# Patient Record
Sex: MALE | Race: WHITE | Employment: FULL TIME | URBAN - METROPOLITAN AREA
[De-identification: names, ages, dates, MRNs, and addresses within clinical notes are randomized per-mention and may not be internally consistent; named-entity substitution may affect disease eponyms.]

---

## 2017-01-13 ENCOUNTER — TELEPHONE (OUTPATIENT)
Dept: FAMILY MEDICINE CLINIC | Age: 62
End: 2017-01-13

## 2017-01-19 ENCOUNTER — OFFICE VISIT (OUTPATIENT)
Dept: FAMILY MEDICINE CLINIC | Age: 62
End: 2017-01-19

## 2017-01-19 VITALS
RESPIRATION RATE: 16 BRPM | SYSTOLIC BLOOD PRESSURE: 142 MMHG | TEMPERATURE: 97.9 F | BODY MASS INDEX: 32.25 KG/M2 | WEIGHT: 231.2 LBS | HEART RATE: 60 BPM | DIASTOLIC BLOOD PRESSURE: 92 MMHG

## 2017-01-19 DIAGNOSIS — R73.01 IFG (IMPAIRED FASTING GLUCOSE): Primary | ICD-10-CM

## 2017-01-19 DIAGNOSIS — K21.9 GASTROESOPHAGEAL REFLUX DISEASE WITHOUT ESOPHAGITIS: ICD-10-CM

## 2017-01-19 DIAGNOSIS — R93.2 ABNORMAL GALLBLADDER ULTRASOUND: ICD-10-CM

## 2017-01-19 DIAGNOSIS — K80.50 BILIARY COLIC: ICD-10-CM

## 2017-01-19 DIAGNOSIS — F41.9 ANXIETY: ICD-10-CM

## 2017-01-19 DIAGNOSIS — R97.20 INCREASED PROSTATE SPECIFIC ANTIGEN (PSA) VELOCITY: ICD-10-CM

## 2017-01-19 DIAGNOSIS — I15.2 HYPERTENSION DUE TO ENDOCRINE DISORDER: ICD-10-CM

## 2017-01-19 DIAGNOSIS — E78.5 HYPERLIPIDEMIA WITH TARGET LDL LESS THAN 100: ICD-10-CM

## 2017-01-19 PROCEDURE — 99214 OFFICE O/P EST MOD 30 MIN: CPT | Performed by: FAMILY MEDICINE

## 2017-01-19 RX ORDER — LISINOPRIL 20 MG/1
TABLET ORAL
Qty: 60 TABLET | Refills: 11 | Status: SHIPPED | OUTPATIENT
Start: 2017-01-19 | End: 2018-02-05 | Stop reason: SDUPTHER

## 2017-01-19 RX ORDER — RANITIDINE 150 MG/1
150 TABLET ORAL DAILY PRN
COMMUNITY
End: 2018-03-13

## 2017-01-19 RX ORDER — PRAVASTATIN SODIUM 40 MG
40 TABLET ORAL EVERY EVENING
Qty: 30 TABLET | Refills: 11 | Status: SHIPPED | OUTPATIENT
Start: 2017-01-19 | End: 2018-03-13

## 2017-01-19 RX ORDER — CIPROFLOXACIN 500 MG/1
500 TABLET, FILM COATED ORAL 2 TIMES DAILY
Qty: 84 TABLET | Refills: 0 | Status: SHIPPED | OUTPATIENT
Start: 2017-01-19 | End: 2017-03-02

## 2017-01-19 RX ORDER — PAROXETINE 10 MG/1
10 TABLET, FILM COATED ORAL EVERY MORNING
Qty: 30 TABLET | Refills: 11 | Status: SHIPPED | OUTPATIENT
Start: 2017-01-19 | End: 2017-09-14 | Stop reason: SDUPTHER

## 2017-01-19 RX ORDER — METOPROLOL SUCCINATE 100 MG/1
100 TABLET, EXTENDED RELEASE ORAL DAILY
Qty: 30 TABLET | Refills: 11 | Status: SHIPPED | OUTPATIENT
Start: 2017-01-19 | End: 2018-02-05 | Stop reason: SDUPTHER

## 2017-01-19 ASSESSMENT — ENCOUNTER SYMPTOMS
ABDOMINAL PAIN: 0
BACK PAIN: 1
VOMITING: 0
CHEST TIGHTNESS: 0
CONSTIPATION: 0
DIARRHEA: 0
BLOOD IN STOOL: 0
SHORTNESS OF BREATH: 0
NAUSEA: 0
ANAL BLEEDING: 0

## 2017-01-19 ASSESSMENT — PATIENT HEALTH QUESTIONNAIRE - PHQ9
1. LITTLE INTEREST OR PLEASURE IN DOING THINGS: 0
2. FEELING DOWN, DEPRESSED OR HOPELESS: 0
SUM OF ALL RESPONSES TO PHQ9 QUESTIONS 1 & 2: 0
SUM OF ALL RESPONSES TO PHQ QUESTIONS 1-9: 0

## 2017-01-27 ENCOUNTER — OFFICE VISIT (OUTPATIENT)
Age: 62
End: 2017-01-27

## 2017-01-27 VITALS
RESPIRATION RATE: 16 BRPM | TEMPERATURE: 97.5 F | DIASTOLIC BLOOD PRESSURE: 74 MMHG | BODY MASS INDEX: 32.45 KG/M2 | SYSTOLIC BLOOD PRESSURE: 138 MMHG | HEART RATE: 60 BPM | WEIGHT: 231.8 LBS | OXYGEN SATURATION: 97 % | HEIGHT: 71 IN

## 2017-01-27 DIAGNOSIS — K80.10 CHRONIC CHOLECYSTITIS WITH CALCULUS: Primary | ICD-10-CM

## 2017-01-27 DIAGNOSIS — I10 ESSENTIAL HYPERTENSION: ICD-10-CM

## 2017-01-27 PROCEDURE — 99244 OFF/OP CNSLTJ NEW/EST MOD 40: CPT | Performed by: SURGERY

## 2017-01-27 ASSESSMENT — ENCOUNTER SYMPTOMS
ABDOMINAL DISTENTION: 0
RHINORRHEA: 0
EYE DISCHARGE: 0
EYE ITCHING: 0
TROUBLE SWALLOWING: 0
VOICE CHANGE: 0
NAUSEA: 0
EYE PAIN: 0
COUGH: 0
SORE THROAT: 0
FACIAL SWELLING: 0
VOMITING: 0
CONSTIPATION: 0
BACK PAIN: 1
BLOOD IN STOOL: 0
SINUS PRESSURE: 0
PHOTOPHOBIA: 0
STRIDOR: 0
ANAL BLEEDING: 0
COLOR CHANGE: 0
SHORTNESS OF BREATH: 0
CHOKING: 0
APNEA: 0
RECTAL PAIN: 0
ABDOMINAL PAIN: 1
EYE REDNESS: 0
CHEST TIGHTNESS: 0
DIARRHEA: 0
WHEEZING: 0

## 2017-03-02 ENCOUNTER — OFFICE VISIT (OUTPATIENT)
Dept: FAMILY MEDICINE CLINIC | Age: 62
End: 2017-03-02

## 2017-03-02 VITALS
SYSTOLIC BLOOD PRESSURE: 146 MMHG | BODY MASS INDEX: 31.38 KG/M2 | RESPIRATION RATE: 16 BRPM | WEIGHT: 225 LBS | DIASTOLIC BLOOD PRESSURE: 80 MMHG | HEART RATE: 60 BPM | TEMPERATURE: 97.7 F

## 2017-03-02 DIAGNOSIS — I15.2 HYPERTENSION DUE TO ENDOCRINE DISORDER: ICD-10-CM

## 2017-03-02 DIAGNOSIS — R73.01 IFG (IMPAIRED FASTING GLUCOSE): Primary | ICD-10-CM

## 2017-03-02 DIAGNOSIS — R17 ELEVATED BILIRUBIN: ICD-10-CM

## 2017-03-02 DIAGNOSIS — E78.5 HYPERLIPIDEMIA WITH TARGET LDL LESS THAN 100: ICD-10-CM

## 2017-03-02 DIAGNOSIS — F41.9 ANXIETY: ICD-10-CM

## 2017-03-02 DIAGNOSIS — K21.9 GASTROESOPHAGEAL REFLUX DISEASE WITHOUT ESOPHAGITIS: ICD-10-CM

## 2017-03-02 DIAGNOSIS — E26.9 HYPERALDOSTERONISM (HCC): ICD-10-CM

## 2017-03-02 PROCEDURE — 99213 OFFICE O/P EST LOW 20 MIN: CPT | Performed by: FAMILY MEDICINE

## 2017-03-02 ASSESSMENT — ENCOUNTER SYMPTOMS
DIARRHEA: 0
NAUSEA: 0
ANAL BLEEDING: 0
CHEST TIGHTNESS: 0
CONSTIPATION: 0
BLOOD IN STOOL: 0
SHORTNESS OF BREATH: 0
VOMITING: 0
ABDOMINAL PAIN: 0

## 2017-03-28 ENCOUNTER — TELEPHONE (OUTPATIENT)
Age: 62
End: 2017-03-28

## 2017-05-02 RX ORDER — EPLERENONE 50 MG/1
50 TABLET, FILM COATED ORAL DAILY
Qty: 30 TABLET | Refills: 0 | Status: CANCELLED | OUTPATIENT
Start: 2017-05-02

## 2017-08-14 RX ORDER — AMLODIPINE BESYLATE 5 MG/1
TABLET ORAL
Qty: 60 TABLET | Refills: 11 | Status: SHIPPED | OUTPATIENT
Start: 2017-08-14 | End: 2018-03-26 | Stop reason: ALTCHOICE

## 2017-09-06 ENCOUNTER — HOSPITAL ENCOUNTER (OUTPATIENT)
Age: 62
Discharge: HOME | End: 2017-09-06
Payer: COMMERCIAL

## 2017-09-06 DIAGNOSIS — R17 ELEVATED BILIRUBIN: ICD-10-CM

## 2017-09-06 DIAGNOSIS — E78.5 HYPERLIPIDEMIA WITH TARGET LDL LESS THAN 100: ICD-10-CM

## 2017-09-06 DIAGNOSIS — R73.01 IFG (IMPAIRED FASTING GLUCOSE): ICD-10-CM

## 2017-09-06 LAB
ALBUMIN SERPL-MCNC: 4.2 G/DL (ref 3.5–5.1)
ALP BLD-CCNC: 74 U/L (ref 38–126)
ALT SERPL-CCNC: 25 U/L (ref 11–66)
AST SERPL-CCNC: 22 U/L (ref 5–40)
AVERAGE GLUCOSE: 108 MG/DL (ref 70–126)
BILIRUB SERPL-MCNC: 1.2 MG/DL (ref 0.3–1.2)
BILIRUBIN DIRECT: < 0.2 MG/DL (ref 0–0.3)
HBA1C MFR BLD: 5.6 % (ref 4.4–6.4)
HDLC SERPL-MCNC: 42 MG/DL
LDL CHOLESTEROL DIRECT: 127.09 MG/DL
TOTAL PROTEIN: 7.1 G/DL (ref 6.1–8)

## 2017-09-06 PROCEDURE — 83036 HEMOGLOBIN GLYCOSYLATED A1C: CPT

## 2017-09-06 PROCEDURE — 80076 HEPATIC FUNCTION PANEL: CPT

## 2017-09-06 PROCEDURE — 36415 COLL VENOUS BLD VENIPUNCTURE: CPT

## 2017-09-06 PROCEDURE — 83721 ASSAY OF BLOOD LIPOPROTEIN: CPT

## 2017-09-06 PROCEDURE — 83718 ASSAY OF LIPOPROTEIN: CPT

## 2017-09-14 ENCOUNTER — OFFICE VISIT (OUTPATIENT)
Dept: FAMILY MEDICINE CLINIC | Age: 62
End: 2017-09-14
Payer: COMMERCIAL

## 2017-09-14 VITALS
TEMPERATURE: 98.6 F | WEIGHT: 226 LBS | HEART RATE: 56 BPM | BODY MASS INDEX: 32.35 KG/M2 | SYSTOLIC BLOOD PRESSURE: 156 MMHG | HEIGHT: 70 IN | RESPIRATION RATE: 16 BRPM | DIASTOLIC BLOOD PRESSURE: 90 MMHG

## 2017-09-14 DIAGNOSIS — R73.01 IFG (IMPAIRED FASTING GLUCOSE): Primary | ICD-10-CM

## 2017-09-14 DIAGNOSIS — E26.9 HYPERALDOSTERONISM (HCC): ICD-10-CM

## 2017-09-14 DIAGNOSIS — E78.5 HYPERLIPIDEMIA WITH TARGET LDL LESS THAN 100: ICD-10-CM

## 2017-09-14 DIAGNOSIS — I15.2 HYPERTENSION DUE TO ENDOCRINE DISORDER: ICD-10-CM

## 2017-09-14 DIAGNOSIS — F41.9 ANXIETY: ICD-10-CM

## 2017-09-14 DIAGNOSIS — K21.9 GASTROESOPHAGEAL REFLUX DISEASE WITHOUT ESOPHAGITIS: ICD-10-CM

## 2017-09-14 DIAGNOSIS — Z12.5 SCREENING PSA (PROSTATE SPECIFIC ANTIGEN): ICD-10-CM

## 2017-09-14 PROCEDURE — 99214 OFFICE O/P EST MOD 30 MIN: CPT | Performed by: FAMILY MEDICINE

## 2017-09-14 RX ORDER — EPLERENONE 50 MG/1
50 TABLET, FILM COATED ORAL DAILY
Qty: 30 TABLET | Refills: 0 | Status: SHIPPED | OUTPATIENT
Start: 2017-09-14 | End: 2018-02-05 | Stop reason: SDUPTHER

## 2017-09-14 RX ORDER — PAROXETINE HYDROCHLORIDE 20 MG/1
20 TABLET, FILM COATED ORAL EVERY MORNING
Qty: 30 TABLET | Refills: 1 | Status: SHIPPED | OUTPATIENT
Start: 2017-09-14 | End: 2018-02-05 | Stop reason: SDUPTHER

## 2017-09-14 ASSESSMENT — ENCOUNTER SYMPTOMS
ABDOMINAL PAIN: 0
CHEST TIGHTNESS: 0
SHORTNESS OF BREATH: 0
CONSTIPATION: 0
DIARRHEA: 0
ANAL BLEEDING: 0
VOMITING: 0
BLOOD IN STOOL: 0
NAUSEA: 0

## 2018-02-05 DIAGNOSIS — I15.2 HYPERTENSION DUE TO ENDOCRINE DISORDER: ICD-10-CM

## 2018-02-05 DIAGNOSIS — F41.9 ANXIETY: ICD-10-CM

## 2018-02-05 RX ORDER — PAROXETINE HYDROCHLORIDE 20 MG/1
20 TABLET, FILM COATED ORAL EVERY MORNING
Qty: 30 TABLET | Refills: 11 | Status: SHIPPED | OUTPATIENT
Start: 2018-02-05 | End: 2019-02-23 | Stop reason: SDUPTHER

## 2018-02-05 RX ORDER — EPLERENONE 50 MG/1
50 TABLET, FILM COATED ORAL DAILY
Qty: 30 TABLET | Refills: 11 | Status: SHIPPED | OUTPATIENT
Start: 2018-02-05 | End: 2018-03-13 | Stop reason: SDUPTHER

## 2018-02-05 RX ORDER — LISINOPRIL 20 MG/1
TABLET ORAL
Qty: 60 TABLET | Refills: 11 | Status: ON HOLD | OUTPATIENT
Start: 2018-02-05 | End: 2018-07-19 | Stop reason: HOSPADM

## 2018-02-05 RX ORDER — METOPROLOL SUCCINATE 100 MG/1
100 TABLET, EXTENDED RELEASE ORAL DAILY
Qty: 30 TABLET | Refills: 11 | Status: ON HOLD | OUTPATIENT
Start: 2018-02-05 | End: 2018-07-19 | Stop reason: HOSPADM

## 2018-02-12 ENCOUNTER — TELEPHONE (OUTPATIENT)
Dept: FAMILY MEDICINE CLINIC | Age: 63
End: 2018-02-12

## 2018-02-12 NOTE — TELEPHONE ENCOUNTER
Babar requested an appointment through [address] for March with Dr Christopher Wooten. However, there are no openings for several weeks/months. He is wanting to reschedule his appointment from Nov 2, 2017. See patients MyChart message below. Please advise patient. I had an appointment with Dr Kevin Meek on 11/2/2017 that I had to cancel.   I am needing to reschedule that.   ----- Message -----   From: Antionette Malcolmt: 2/6/2018  8:57 AM EST   To: Subha Haddad   Subject: RE: Appointment Request   Quintin Eckert -   We received your appointment request but it's not clear what the reason for the visit is. Please advise.       ----- Message -----   Jacky Iglesias From: Subha Haddad      Sent: 2/5/2018  4:47 PM EST        To: Patient Appointment Schedule Request Mailing List   Subject: Appointment Request      Appointment Request From: Subha Haddad      With Provider: Monika Oscar MD [-Primary Care Physician-]      Preferred Date Range: From 3/1/2018 To 3/30/2018      Preferred Times: Any      Reason for visit: Office Visit      Comments

## 2018-02-12 NOTE — TELEPHONE ENCOUNTER
Ok to accommodate with an appt in March? Patient was last seen on 9/14/17 and had medication adjusted and this was supposed to be a f/u appt to that.  (Paxil increased and Inspra resumed)

## 2018-03-09 ENCOUNTER — HOSPITAL ENCOUNTER (OUTPATIENT)
Age: 63
Discharge: HOME | End: 2018-03-09
Payer: COMMERCIAL

## 2018-03-09 DIAGNOSIS — I15.2 HYPERTENSION DUE TO ENDOCRINE DISORDER: ICD-10-CM

## 2018-03-09 DIAGNOSIS — Z12.5 SCREENING PSA (PROSTATE SPECIFIC ANTIGEN): ICD-10-CM

## 2018-03-09 DIAGNOSIS — E78.5 HYPERLIPIDEMIA WITH TARGET LDL LESS THAN 100: ICD-10-CM

## 2018-03-09 DIAGNOSIS — R73.01 IFG (IMPAIRED FASTING GLUCOSE): ICD-10-CM

## 2018-03-09 LAB
ANION GAP SERPL CALCULATED.3IONS-SCNC: 12 MEQ/L (ref 8–16)
AVERAGE GLUCOSE: 114 MG/DL (ref 70–126)
BUN BLDV-MCNC: 18 MG/DL (ref 7–22)
CALCIUM SERPL-MCNC: 9.4 MG/DL (ref 8.5–10.5)
CHLORIDE BLD-SCNC: 101 MEQ/L (ref 98–111)
CHOLESTEROL, TOTAL: 195 MG/DL (ref 100–199)
CO2: 28 MEQ/L (ref 23–33)
CREAT SERPL-MCNC: 1.1 MG/DL (ref 0.4–1.2)
GFR SERPL CREATININE-BSD FRML MDRD: 68 ML/MIN/1.73M2
GLUCOSE BLD-MCNC: 104 MG/DL (ref 70–108)
HBA1C MFR BLD: 5.8 % (ref 4.4–6.4)
HDLC SERPL-MCNC: 43 MG/DL
LDL CHOLESTEROL CALCULATED: 124 MG/DL
POTASSIUM SERPL-SCNC: 4.2 MEQ/L (ref 3.5–5.2)
PROSTATE SPECIFIC ANTIGEN: 1.01 NG/ML (ref 0–1)
SODIUM BLD-SCNC: 141 MEQ/L (ref 135–145)
TRIGL SERPL-MCNC: 142 MG/DL (ref 0–199)

## 2018-03-09 PROCEDURE — G0103 PSA SCREENING: HCPCS

## 2018-03-09 PROCEDURE — 80061 LIPID PANEL: CPT

## 2018-03-09 PROCEDURE — 83036 HEMOGLOBIN GLYCOSYLATED A1C: CPT

## 2018-03-09 PROCEDURE — 36415 COLL VENOUS BLD VENIPUNCTURE: CPT

## 2018-03-09 PROCEDURE — 80048 BASIC METABOLIC PNL TOTAL CA: CPT

## 2018-03-13 ENCOUNTER — OFFICE VISIT (OUTPATIENT)
Dept: FAMILY MEDICINE CLINIC | Age: 63
End: 2018-03-13
Payer: COMMERCIAL

## 2018-03-13 VITALS
TEMPERATURE: 98.2 F | DIASTOLIC BLOOD PRESSURE: 90 MMHG | HEIGHT: 70 IN | WEIGHT: 226 LBS | HEART RATE: 60 BPM | BODY MASS INDEX: 32.35 KG/M2 | SYSTOLIC BLOOD PRESSURE: 142 MMHG | RESPIRATION RATE: 16 BRPM

## 2018-03-13 DIAGNOSIS — K63.5 POLYP OF COLON, UNSPECIFIED PART OF COLON, UNSPECIFIED TYPE: ICD-10-CM

## 2018-03-13 DIAGNOSIS — F41.9 ANXIETY: ICD-10-CM

## 2018-03-13 DIAGNOSIS — R73.01 IFG (IMPAIRED FASTING GLUCOSE): Primary | ICD-10-CM

## 2018-03-13 DIAGNOSIS — K21.9 GASTROESOPHAGEAL REFLUX DISEASE WITHOUT ESOPHAGITIS: ICD-10-CM

## 2018-03-13 DIAGNOSIS — I15.2 HYPERTENSION DUE TO ENDOCRINE DISORDER: ICD-10-CM

## 2018-03-13 DIAGNOSIS — E78.5 HYPERLIPIDEMIA WITH TARGET LDL LESS THAN 100: ICD-10-CM

## 2018-03-13 PROCEDURE — 99214 OFFICE O/P EST MOD 30 MIN: CPT | Performed by: FAMILY MEDICINE

## 2018-03-13 RX ORDER — EPLERENONE 50 MG/1
50 TABLET, FILM COATED ORAL 2 TIMES DAILY
Qty: 60 TABLET | Refills: 1 | Status: SHIPPED | OUTPATIENT
Start: 2018-03-13 | End: 2020-01-16

## 2018-03-13 ASSESSMENT — PATIENT HEALTH QUESTIONNAIRE - PHQ9
2. FEELING DOWN, DEPRESSED OR HOPELESS: 0
1. LITTLE INTEREST OR PLEASURE IN DOING THINGS: 0
SUM OF ALL RESPONSES TO PHQ9 QUESTIONS 1 & 2: 0
SUM OF ALL RESPONSES TO PHQ QUESTIONS 1-9: 0

## 2018-03-13 ASSESSMENT — ENCOUNTER SYMPTOMS
CONSTIPATION: 0
CHEST TIGHTNESS: 0
SHORTNESS OF BREATH: 0
ABDOMINAL PAIN: 0
VOMITING: 0
DIARRHEA: 0
BLOOD IN STOOL: 0
NAUSEA: 0
ANAL BLEEDING: 0

## 2018-03-13 NOTE — PROGRESS NOTES
Dr Martina Nur can see him 3-26-18 at 10:20 for a follow up visit. I called [facility] to make colonoscopy appt, they state he has been discharged from the practice in 2014.  I will work on another GI referral.

## 2018-03-13 NOTE — PROGRESS NOTES
[facility]  [facility]  Dept: [phone]  Dept Fax: [phone]    RAFAEL Herrera is a 63 y.o.male    Pt feeling ok since last visit- no new complaints, issues, or problems, except as noted below: The home BP readings have been in the 150 /  range. Checking intermittently. Pt states he not followed up with Dr. Virgilio Bustamante as directed at time of last visit. Otherwise, pt feeling \"pretty good\" at this time  Sleep-OK  Interest- OK  Guilt- OK  Energy- OK  Concentration- OK  Appetite- OK  Psychomotor Retardation- NO  Suicidal/ Homicidal Ideations- NO  Anxiety-OK  Libido- OK    Employer? Lost work days? - [employer]- no lost days    Patient Active Problem List   Diagnosis    Hyperaldosteronism- Dr. Aracely Keita Colon polyps    GERD (gastroesophageal reflux disease)    Hyperlipidemia with target LDL less than 100    Anxiety    Hypertension due to endocrine disorder    IFG (impaired fasting glucose)       Current Outpatient Prescriptions   Medication Sig Dispense Refill    eplerenone (INSPRA) 50 MG tablet Take 1 tablet by mouth 2 times daily 60 tablet 1    lisinopril (PRINIVIL;ZESTRIL) 20 MG tablet TAKE ONE TABLET BY MOUTH TWICE DAILY 60 tablet 11    metoprolol succinate (TOPROL XL) 100 MG extended release tablet Take 1 tablet by mouth daily 30 tablet 11    PARoxetine (PAXIL) 20 MG tablet Take 1 tablet by mouth every morning 30 tablet 11    amLODIPine (NORVASC) 5 MG tablet TAKE ONE TABLET BY MOUTH TWICE DAILY 60 tablet 11     No current facility-administered medications for this visit. Review of Systems   Constitutional: Negative for chills, diaphoresis, fatigue, fever and unexpected weight change. Eyes: Negative for visual disturbance. Respiratory: Negative for chest tightness and shortness of breath. Cardiovascular: Negative for chest pain, palpitations and leg swelling.    Gastrointestinal: Negative for abdominal pain, anal bleeding, blood in stool, constipation, diarrhea, nausea and vomiting. Genitourinary: Negative for dysuria and hematuria. Musculoskeletal: Negative for neck pain. Neurological: Negative for dizziness, light-headedness and headaches. OBJECTIVE     BP (!) 162/88   Pulse 60   Temp 98.2 °F (36.8 °C) (Oral)   Resp 16   Ht 5' 10\" (1.778 m)   Wt 226 lb (102.5 kg)   BMI 32.43 kg/m²     Wt Readings from Last 3 Encounters:   03/13/18 226 lb (102.5 kg)   09/14/17 226 lb (102.5 kg)   03/02/17 225 lb (102.1 kg)     Body mass index is 32.43 kg/m². Physical Exam   Constitutional: He is oriented to person, place, and time. He appears well-developed and well-nourished. HENT:   Head: Normocephalic and atraumatic. Right Ear: External ear normal.   Left Ear: External ear normal.   Nose: Nose normal.   Mouth/Throat: Oropharynx is clear and moist.   Eyes: Conjunctivae and EOM are normal. Pupils are equal, round, and reactive to light. Neck: Normal range of motion. Neck supple. Cardiovascular: Normal rate, regular rhythm and intact distal pulses. Pulmonary/Chest: Effort normal and breath sounds normal.   Abdominal: Soft. Bowel sounds are normal.   Genitourinary:   Genitourinary Comments: KEYSHA deferred today as pt currently asymptomatic. Pt denies dysuria, hematuria, frequency, urgency, difficulty starting/ stopping stream, frequent nocturia    Will reconsider annually. ES     Musculoskeletal: Normal range of motion. Neurological: He is alert and oriented to person, place, and time. He has normal reflexes. Skin: Skin is warm and dry. Psychiatric: He has a normal mood and affect. His behavior is normal. Judgment and thought content normal.   Nursing note and vitals reviewed.         Component      Latest Ref Rng & Units 3/9/2018 9/6/2017   Sodium      135 - 145 meq/L 141    Potassium      3.5 - 5.2 meq/L 4.2    Chloride      98 - 111 meq/L 101    CO2      23 - 33 meq/L 28    Glucose      70 - 108 mg/dL 104    BUN 7 - 22 mg/dL 18    Creatinine      0.4 - 1.2 mg/dL 1.1    Calcium      8.5 - 10.5 mg/dL 9.4    Albumin      3.5 - 5.1 g/dL  4.2   Bilirubin      0.3 - 1.2 mg/dL  1.2   Bilirubin, Direct      0.0 - 0.3 mg/dL  <0.2   Alk Phos      38 - 126 U/L  74   AST      5 - 40 U/L  22   ALT      11 - 66 U/L  25   Total Protein      6.1 - 8.0 g/dL  7.1   Cholesterol, Total      100 - 199 mg/dL 195    Triglycerides      0 - 199 mg/dL 142    HDL Cholesterol      mg/dL 43    LDL Calculated      mg/dL 124    Hemoglobin A1C      4.4 - 6.4 % 5.8    AVERAGE GLUCOSE      70 - 126 mg/dL 114    Prostatic Specific Ag      0.00 - 1.00 ng/ml 1.01 (H)    Anion Gap      8.0 - 16.0 meq/L 12.0    Est, Glom Filt Rate      ml/min/1.73m2 68 (A)        Component      Latest Ref Rng & Units 3/9/2018 2/28/2017 1/18/2017 5/18/2015   Prostatic Specific Ag      0.00 - 1.00 ng/ml 1.01 (H) 0.94 2.53 (H) 0.90     Component      Latest Ref Rng & Units 11/12/2014 5/9/2013 10/20/2011   Prostatic Specific Ag      0.00 - 1.00 ng/ml 1.87 0.92 1.41       The 10-year ASCVD risk score (Susan Suh, et al., 2013) is: 16.2%    Values used to calculate the score:      Age: 63 years      Sex: Male      Is Non- : No      Diabetic: No      Tobacco smoker: No      Systolic Blood Pressure: 855 mmHg      Is BP treated: Yes      HDL Cholesterol: 43 mg/dL      Total Cholesterol: 195 mg/dL    Immunization History   Administered Date(s) Administered    Influenza Virus Vaccine 10/10/2013    Zoster Live (Zostavax) 11/11/2013       Health Maintenance   Topic Date Due    DTaP/Tdap/Td vaccine (1 - Tdap) 03/15/1976    Shingles Vaccine (1 of 2 - 2 Dose Series) 03/15/2007    Colon cancer screen colonoscopy  05/29/2016    Hepatitis C screen  09/14/2018 (Originally 1955)    HIV screen  09/14/2018 (Originally 3/15/1972)    Flu vaccine (1) 10/13/2018 (Originally 9/1/2017)    A1C test (Diabetic or Prediabetic)  03/09/2019    Potassium monitoring

## 2018-03-14 ENCOUNTER — TELEPHONE (OUTPATIENT)
Dept: FAMILY MEDICINE CLINIC | Age: 63
End: 2018-03-14

## 2018-03-14 NOTE — TELEPHONE ENCOUNTER
[facility] called back and stated that the pts wife did pay his bill there so they are able to schedule him an appt. They will contact him to schedule.

## 2018-03-26 ENCOUNTER — OFFICE VISIT (OUTPATIENT)
Dept: NEPHROLOGY | Age: 63
End: 2018-03-26
Payer: COMMERCIAL

## 2018-03-26 VITALS
BODY MASS INDEX: 32.43 KG/M2 | DIASTOLIC BLOOD PRESSURE: 90 MMHG | WEIGHT: 226 LBS | HEART RATE: 60 BPM | SYSTOLIC BLOOD PRESSURE: 142 MMHG | RESPIRATION RATE: 20 BRPM

## 2018-03-26 DIAGNOSIS — F41.9 ANXIETY: ICD-10-CM

## 2018-03-26 DIAGNOSIS — I10 ESSENTIAL HYPERTENSION: Primary | ICD-10-CM

## 2018-03-26 PROCEDURE — 99214 OFFICE O/P EST MOD 30 MIN: CPT | Performed by: INTERNAL MEDICINE

## 2018-03-26 RX ORDER — CHLORTHALIDONE 25 MG/1
25 TABLET ORAL DAILY
Qty: 30 TABLET | Refills: 3 | Status: ON HOLD | OUTPATIENT
Start: 2018-03-26 | End: 2018-06-10 | Stop reason: HOSPADM

## 2018-03-26 NOTE — PROGRESS NOTES
primary care provider from 25 mg to 50 mg daily. Blood pressure was high on arrival.  Repeat blood pressure is 130/90.    ROS:Constitutional: negative  Eyes: negative  Ears, nose, mouth, throat, and face: negative  Respiratory: negative  Cardiovascular: negative  Gastrointestinal: negative  Genitourinary:negative  Integument/breast: negative  Hematologic/lymphatic: negative  Musculoskeletal:negative  Neurological: negative  Behavioral/Psych: positive for anxiety  Endocrine: negative  Allergic/Immunologic: negative  Medications:     Current Outpatient Prescriptions   Medication Sig Dispense Refill    polyethylene glycol (GLYCOLAX) powder Colonoscopy Prep Dispense 255 Gram Bottle. Use as Directed 255 g 0    eplerenone (INSPRA) 50 MG tablet Take 1 tablet by mouth 2 times daily 60 tablet 1    lisinopril (PRINIVIL;ZESTRIL) 20 MG tablet TAKE ONE TABLET BY MOUTH TWICE DAILY 60 tablet 11    metoprolol succinate (TOPROL XL) 100 MG extended release tablet Take 1 tablet by mouth daily 30 tablet 11    PARoxetine (PAXIL) 20 MG tablet Take 1 tablet by mouth every morning 30 tablet 11    amLODIPine (NORVASC) 5 MG tablet TAKE ONE TABLET BY MOUTH TWICE DAILY 60 tablet 11     No current facility-administered medications for this visit.         Lab Results:    CBC:   Lab Results   Component Value Date    WBC 6.2 11/07/2016    HGB 15.1 11/07/2016    HCT 46.1 11/07/2016    MCV 85.2 11/07/2016     11/07/2016     BMP:    Lab Results   Component Value Date     03/09/2018     02/28/2017     11/07/2016    K 4.2 03/09/2018    K 4.1 02/28/2017    K 3.7 11/07/2016     03/09/2018     02/28/2017    CL 99 11/07/2016    CO2 28 03/09/2018    CO2 28 02/28/2017    CO2 29 11/07/2016    BUN 18 03/09/2018    BUN 13 02/28/2017    BUN 18 11/07/2016    CREATININE 1.1 03/09/2018    CREATININE 1.1 02/28/2017    CREATININE 1.1 11/07/2016    GLUCOSE 104 03/09/2018    GLUCOSE 109 (H) 02/28/2017    GLUCOSE 116 (H)

## 2018-04-25 ENCOUNTER — HOSPITAL ENCOUNTER (OUTPATIENT)
Age: 63
Discharge: HOME | End: 2018-04-25
Payer: COMMERCIAL

## 2018-04-25 DIAGNOSIS — I15.2 HYPERTENSION DUE TO ENDOCRINE DISORDER: ICD-10-CM

## 2018-04-25 LAB
ANION GAP SERPL CALCULATED.3IONS-SCNC: 10 MEQ/L (ref 8–16)
BUN BLDV-MCNC: 19 MG/DL (ref 7–22)
CALCIUM SERPL-MCNC: 9.7 MG/DL (ref 8.5–10.5)
CHLORIDE BLD-SCNC: 99 MEQ/L (ref 98–111)
CO2: 34 MEQ/L (ref 23–33)
CREAT SERPL-MCNC: 1.2 MG/DL (ref 0.4–1.2)
GFR SERPL CREATININE-BSD FRML MDRD: 62 ML/MIN/1.73M2
GLUCOSE BLD-MCNC: 112 MG/DL (ref 70–108)
POTASSIUM SERPL-SCNC: 3.8 MEQ/L (ref 3.5–5.2)
SODIUM BLD-SCNC: 143 MEQ/L (ref 135–145)

## 2018-04-25 PROCEDURE — 36415 COLL VENOUS BLD VENIPUNCTURE: CPT

## 2018-04-25 PROCEDURE — 80048 BASIC METABOLIC PNL TOTAL CA: CPT

## 2018-05-01 ENCOUNTER — OFFICE VISIT (OUTPATIENT)
Dept: NEPHROLOGY | Age: 63
End: 2018-05-01
Payer: COMMERCIAL

## 2018-05-01 VITALS — SYSTOLIC BLOOD PRESSURE: 130 MMHG | WEIGHT: 223.8 LBS | DIASTOLIC BLOOD PRESSURE: 90 MMHG | BODY MASS INDEX: 32.11 KG/M2

## 2018-05-01 DIAGNOSIS — E87.3 METABOLIC ALKALOSIS: ICD-10-CM

## 2018-05-01 DIAGNOSIS — F41.9 ANXIETY: ICD-10-CM

## 2018-05-01 DIAGNOSIS — I10 ESSENTIAL HYPERTENSION: Primary | ICD-10-CM

## 2018-05-01 DIAGNOSIS — R97.20 PSA ELEVATION: ICD-10-CM

## 2018-05-01 PROCEDURE — 99214 OFFICE O/P EST MOD 30 MIN: CPT | Performed by: INTERNAL MEDICINE

## 2018-06-06 ENCOUNTER — APPOINTMENT (OUTPATIENT)
Dept: GENERAL RADIOLOGY | Age: 63
DRG: 418 | End: 2018-06-06
Payer: COMMERCIAL

## 2018-06-06 ENCOUNTER — HOSPITAL ENCOUNTER (INPATIENT)
Age: 63
LOS: 3 days | Discharge: HOME | DRG: 418 | End: 2018-06-10
Attending: EMERGENCY MEDICINE | Admitting: INTERNAL MEDICINE
Payer: COMMERCIAL

## 2018-06-06 DIAGNOSIS — E26.9 HYPERALDOSTERONISM (HCC): ICD-10-CM

## 2018-06-06 DIAGNOSIS — R11.2 INTRACTABLE VOMITING WITH NAUSEA, UNSPECIFIED VOMITING TYPE: Primary | ICD-10-CM

## 2018-06-06 DIAGNOSIS — E87.6 HYPOKALEMIA: ICD-10-CM

## 2018-06-06 DIAGNOSIS — G89.18 ACUTE POSTOPERATIVE PAIN: ICD-10-CM

## 2018-06-06 DIAGNOSIS — I10 UNCONTROLLED HYPERTENSION: ICD-10-CM

## 2018-06-06 DIAGNOSIS — Z90.49 S/P LAPAROSCOPIC CHOLECYSTECTOMY: ICD-10-CM

## 2018-06-06 LAB
BASOPHILS # BLD: 0.9 %
BASOPHILS ABSOLUTE: 0.1 THOU/MM3 (ref 0–0.1)
EOSINOPHIL # BLD: 1.2 %
EOSINOPHILS ABSOLUTE: 0.1 THOU/MM3 (ref 0–0.4)
HCT VFR BLD CALC: 44 % (ref 42–52)
HEMOGLOBIN: 14.9 GM/DL (ref 14–18)
LYMPHOCYTES # BLD: 51.6 %
LYMPHOCYTES ABSOLUTE: 4.6 THOU/MM3 (ref 1–4.8)
MCH RBC QN AUTO: 29.2 PG (ref 27–31)
MCHC RBC AUTO-ENTMCNC: 34 GM/DL (ref 33–37)
MCV RBC AUTO: 86.1 FL (ref 80–94)
MONOCYTES # BLD: 10.1 %
MONOCYTES ABSOLUTE: 0.9 THOU/MM3 (ref 0.4–1.3)
NUCLEATED RED BLOOD CELLS: 0 /100 WBC
PDW BLD-RTO: 14.4 % (ref 11.5–14.5)
PLATELET # BLD: 255 THOU/MM3 (ref 130–400)
PMV BLD AUTO: 8.9 FL (ref 7.4–10.4)
RBC # BLD: 5.11 MILL/MM3 (ref 4.7–6.1)
SEG NEUTROPHILS: 36.2 %
SEGMENTED NEUTROPHILS ABSOLUTE COUNT: 3.2 THOU/MM3 (ref 1.8–7.7)
WBC # BLD: 8.9 THOU/MM3 (ref 4.8–10.8)

## 2018-06-06 PROCEDURE — 83735 ASSAY OF MAGNESIUM: CPT

## 2018-06-06 PROCEDURE — 80053 COMPREHEN METABOLIC PANEL: CPT

## 2018-06-06 PROCEDURE — 6370000000 HC RX 637 (ALT 250 FOR IP): Performed by: EMERGENCY MEDICINE

## 2018-06-06 PROCEDURE — 82248 BILIRUBIN DIRECT: CPT

## 2018-06-06 PROCEDURE — 84443 ASSAY THYROID STIM HORMONE: CPT

## 2018-06-06 PROCEDURE — 93005 ELECTROCARDIOGRAM TRACING: CPT | Performed by: EMERGENCY MEDICINE

## 2018-06-06 PROCEDURE — G0480 DRUG TEST DEF 1-7 CLASSES: HCPCS

## 2018-06-06 PROCEDURE — 36415 COLL VENOUS BLD VENIPUNCTURE: CPT

## 2018-06-06 PROCEDURE — 84484 ASSAY OF TROPONIN QUANT: CPT

## 2018-06-06 PROCEDURE — 83690 ASSAY OF LIPASE: CPT

## 2018-06-06 PROCEDURE — 71046 X-RAY EXAM CHEST 2 VIEWS: CPT

## 2018-06-06 PROCEDURE — 85025 COMPLETE CBC W/AUTO DIFF WBC: CPT

## 2018-06-06 PROCEDURE — 99285 EMERGENCY DEPT VISIT HI MDM: CPT

## 2018-06-06 RX ORDER — CLONIDINE HYDROCHLORIDE 0.1 MG/1
0.1 TABLET ORAL ONCE
Status: COMPLETED | OUTPATIENT
Start: 2018-06-07 | End: 2018-06-06

## 2018-06-06 RX ADMIN — CLONIDINE HYDROCHLORIDE 0.1 MG: 0.1 TABLET ORAL at 23:43

## 2018-06-06 ASSESSMENT — PAIN SCALES - GENERAL: PAINLEVEL_OUTOF10: 7

## 2018-06-06 ASSESSMENT — PAIN DESCRIPTION - PAIN TYPE: TYPE: ACUTE PAIN

## 2018-06-06 ASSESSMENT — PAIN DESCRIPTION - ORIENTATION: ORIENTATION: MID

## 2018-06-06 ASSESSMENT — PAIN DESCRIPTION - LOCATION: LOCATION: CHEST

## 2018-06-07 ENCOUNTER — APPOINTMENT (OUTPATIENT)
Dept: CT IMAGING | Age: 63
DRG: 418 | End: 2018-06-07
Payer: COMMERCIAL

## 2018-06-07 ENCOUNTER — APPOINTMENT (OUTPATIENT)
Dept: ULTRASOUND IMAGING | Age: 63
DRG: 418 | End: 2018-06-07
Payer: COMMERCIAL

## 2018-06-07 PROBLEM — E26.09 PRIMARY HYPERALDOSTERONISM (HCC): Status: ACTIVE | Noted: 2018-06-07

## 2018-06-07 LAB
ALBUMIN SERPL-MCNC: 4.3 G/DL (ref 3.5–5.1)
ALP BLD-CCNC: 75 U/L (ref 38–126)
ALT SERPL-CCNC: 28 U/L (ref 11–66)
AMPHETAMINE+METHAMPHETAMINE URINE SCREEN: NEGATIVE
ANION GAP SERPL CALCULATED.3IONS-SCNC: 15 MEQ/L (ref 8–16)
ANION GAP SERPL CALCULATED.3IONS-SCNC: 19 MEQ/L (ref 8–16)
AST SERPL-CCNC: 22 U/L (ref 5–40)
BARBITURATE QUANTITATIVE URINE: NEGATIVE
BENZODIAZEPINE QUANTITATIVE URINE: NEGATIVE
BILIRUB SERPL-MCNC: 0.9 MG/DL (ref 0.3–1.2)
BILIRUBIN DIRECT: < 0.2 MG/DL (ref 0–0.3)
BILIRUBIN URINE: NEGATIVE
BLOOD, URINE: NEGATIVE
BUN BLDV-MCNC: 19 MG/DL (ref 7–22)
BUN BLDV-MCNC: 21 MG/DL (ref 7–22)
CALCIUM SERPL-MCNC: 9.1 MG/DL (ref 8.5–10.5)
CALCIUM SERPL-MCNC: 9.3 MG/DL (ref 8.5–10.5)
CANNABINOID QUANTITATIVE URINE: NEGATIVE
CHARACTER, URINE: CLEAR
CHLORIDE BLD-SCNC: 100 MEQ/L (ref 98–111)
CHLORIDE BLD-SCNC: 98 MEQ/L (ref 98–111)
CO2: 24 MEQ/L (ref 23–33)
CO2: 30 MEQ/L (ref 23–33)
COCAINE METABOLITE QUANTITATIVE URINE: NEGATIVE
COLOR: YELLOW
CREAT SERPL-MCNC: 1.2 MG/DL (ref 0.4–1.2)
CREAT SERPL-MCNC: 1.2 MG/DL (ref 0.4–1.2)
EKG ATRIAL RATE: 53 BPM
EKG P AXIS: 27 DEGREES
EKG P-R INTERVAL: 156 MS
EKG Q-T INTERVAL: 460 MS
EKG QRS DURATION: 102 MS
EKG QTC CALCULATION (BAZETT): 431 MS
EKG R AXIS: -31 DEGREES
EKG T AXIS: 11 DEGREES
EKG VENTRICULAR RATE: 53 BPM
ETHYL ALCOHOL, SERUM: < 0.01 %
GFR SERPL CREATININE-BSD FRML MDRD: 62 ML/MIN/1.73M2
GFR SERPL CREATININE-BSD FRML MDRD: 62 ML/MIN/1.73M2
GLUCOSE BLD-MCNC: 113 MG/DL (ref 70–108)
GLUCOSE BLD-MCNC: 166 MG/DL (ref 70–108)
GLUCOSE URINE: NEGATIVE MG/DL
KETONES, URINE: NEGATIVE
LEUKOCYTE ESTERASE, URINE: NEGATIVE
LIPASE: 48.5 U/L (ref 5.6–51.3)
MAGNESIUM: 2.2 MG/DL (ref 1.6–2.4)
NITRITE, URINE: NEGATIVE
OPIATES, URINE: NEGATIVE
OSMOLALITY CALCULATION: 292.5 MOSMOL/KG (ref 275–300)
OXYCODONE: NEGATIVE
PH UA: 6.5
PHENCYCLIDINE QUANTITATIVE URINE: NEGATIVE
POTASSIUM SERPL-SCNC: 2.9 MEQ/L (ref 3.5–5.2)
POTASSIUM SERPL-SCNC: 3 MEQ/L (ref 3.5–5.2)
PROTEIN UA: NEGATIVE
SODIUM BLD-SCNC: 141 MEQ/L (ref 135–145)
SODIUM BLD-SCNC: 145 MEQ/L (ref 135–145)
SPECIFIC GRAVITY, URINE: 1.02 (ref 1–1.03)
TOTAL PROTEIN: 7.6 G/DL (ref 6.1–8)
TROPONIN T: < 0.01 NG/ML
TROPONIN T: < 0.01 NG/ML
TSH SERPL DL<=0.05 MIU/L-ACNC: 3.48 UIU/ML (ref 0.4–4.2)
UROBILINOGEN, URINE: 1 EU/DL

## 2018-06-07 PROCEDURE — 99254 IP/OBS CNSLTJ NEW/EST MOD 60: CPT | Performed by: INTERNAL MEDICINE

## 2018-06-07 PROCEDURE — 80307 DRUG TEST PRSMV CHEM ANLYZR: CPT

## 2018-06-07 PROCEDURE — 36415 COLL VENOUS BLD VENIPUNCTURE: CPT

## 2018-06-07 PROCEDURE — 6370000000 HC RX 637 (ALT 250 FOR IP): Performed by: INTERNAL MEDICINE

## 2018-06-07 PROCEDURE — 96375 TX/PRO/DX INJ NEW DRUG ADDON: CPT

## 2018-06-07 PROCEDURE — 84484 ASSAY OF TROPONIN QUANT: CPT

## 2018-06-07 PROCEDURE — 6360000004 HC RX CONTRAST MEDICATION: Performed by: EMERGENCY MEDICINE

## 2018-06-07 PROCEDURE — 6360000002 HC RX W HCPCS

## 2018-06-07 PROCEDURE — 96376 TX/PRO/DX INJ SAME DRUG ADON: CPT

## 2018-06-07 PROCEDURE — 2580000003 HC RX 258: Performed by: EMERGENCY MEDICINE

## 2018-06-07 PROCEDURE — 76705 ECHO EXAM OF ABDOMEN: CPT

## 2018-06-07 PROCEDURE — 6360000002 HC RX W HCPCS: Performed by: EMERGENCY MEDICINE

## 2018-06-07 PROCEDURE — 2580000003 HC RX 258: Performed by: INTERNAL MEDICINE

## 2018-06-07 PROCEDURE — 74177 CT ABD & PELVIS W/CONTRAST: CPT

## 2018-06-07 PROCEDURE — 96365 THER/PROPH/DIAG IV INF INIT: CPT

## 2018-06-07 PROCEDURE — 80048 BASIC METABOLIC PNL TOTAL CA: CPT

## 2018-06-07 PROCEDURE — 70450 CT HEAD/BRAIN W/O DYE: CPT

## 2018-06-07 PROCEDURE — 6370000000 HC RX 637 (ALT 250 FOR IP): Performed by: EMERGENCY MEDICINE

## 2018-06-07 PROCEDURE — 99222 1ST HOSP IP/OBS MODERATE 55: CPT | Performed by: SURGERY

## 2018-06-07 PROCEDURE — 1200000003 HC TELEMETRY R&B

## 2018-06-07 PROCEDURE — 93010 ELECTROCARDIOGRAM REPORT: CPT | Performed by: INTERNAL MEDICINE

## 2018-06-07 PROCEDURE — 96372 THER/PROPH/DIAG INJ SC/IM: CPT

## 2018-06-07 PROCEDURE — 2500000003 HC RX 250 WO HCPCS: Performed by: INTERNAL MEDICINE

## 2018-06-07 PROCEDURE — 99223 1ST HOSP IP/OBS HIGH 75: CPT | Performed by: INTERNAL MEDICINE

## 2018-06-07 PROCEDURE — 81003 URINALYSIS AUTO W/O SCOPE: CPT

## 2018-06-07 RX ORDER — PROMETHAZINE HYDROCHLORIDE 25 MG/ML
INJECTION, SOLUTION INTRAMUSCULAR; INTRAVENOUS
Status: DISPENSED
Start: 2018-06-07 | End: 2018-06-07

## 2018-06-07 RX ORDER — METOCLOPRAMIDE HYDROCHLORIDE 5 MG/ML
10 INJECTION INTRAMUSCULAR; INTRAVENOUS EVERY 4 HOURS PRN
Status: DISCONTINUED | OUTPATIENT
Start: 2018-06-07 | End: 2018-06-10 | Stop reason: HOSPADM

## 2018-06-07 RX ORDER — LISINOPRIL 20 MG/1
20 TABLET ORAL 2 TIMES DAILY
Status: DISCONTINUED | OUTPATIENT
Start: 2018-06-07 | End: 2018-06-10 | Stop reason: HOSPADM

## 2018-06-07 RX ORDER — DEXTROSE, SODIUM CHLORIDE, AND POTASSIUM CHLORIDE 5; .2; .15 G/100ML; G/100ML; G/100ML
INJECTION INTRAVENOUS CONTINUOUS
Status: DISCONTINUED | OUTPATIENT
Start: 2018-06-07 | End: 2018-06-09

## 2018-06-07 RX ORDER — PROMETHAZINE HYDROCHLORIDE 25 MG/ML
25 INJECTION, SOLUTION INTRAMUSCULAR; INTRAVENOUS ONCE
Status: COMPLETED | OUTPATIENT
Start: 2018-06-07 | End: 2018-06-07

## 2018-06-07 RX ORDER — HYDRALAZINE HYDROCHLORIDE 20 MG/ML
10 INJECTION INTRAMUSCULAR; INTRAVENOUS
Status: DISCONTINUED | OUTPATIENT
Start: 2018-06-07 | End: 2018-06-10 | Stop reason: HOSPADM

## 2018-06-07 RX ORDER — POTASSIUM CHLORIDE 750 MG/1
40 TABLET, FILM COATED, EXTENDED RELEASE ORAL ONCE
Status: COMPLETED | OUTPATIENT
Start: 2018-06-07 | End: 2018-06-07

## 2018-06-07 RX ORDER — SODIUM CHLORIDE 0.9 % (FLUSH) 0.9 %
10 SYRINGE (ML) INJECTION PRN
Status: DISCONTINUED | OUTPATIENT
Start: 2018-06-07 | End: 2018-06-10 | Stop reason: HOSPADM

## 2018-06-07 RX ORDER — SODIUM CHLORIDE 0.9 % (FLUSH) 0.9 %
10 SYRINGE (ML) INJECTION EVERY 12 HOURS SCHEDULED
Status: DISCONTINUED | OUTPATIENT
Start: 2018-06-07 | End: 2018-06-10 | Stop reason: HOSPADM

## 2018-06-07 RX ORDER — MORPHINE SULFATE 4 MG/ML
INJECTION, SOLUTION INTRAMUSCULAR; INTRAVENOUS
Status: COMPLETED
Start: 2018-06-07 | End: 2018-06-07

## 2018-06-07 RX ORDER — 0.9 % SODIUM CHLORIDE 0.9 %
1000 INTRAVENOUS SOLUTION INTRAVENOUS ONCE
Status: COMPLETED | OUTPATIENT
Start: 2018-06-07 | End: 2018-06-07

## 2018-06-07 RX ORDER — METOPROLOL SUCCINATE 100 MG/1
100 TABLET, EXTENDED RELEASE ORAL DAILY
Status: DISCONTINUED | OUTPATIENT
Start: 2018-06-07 | End: 2018-06-10 | Stop reason: HOSPADM

## 2018-06-07 RX ORDER — PAROXETINE HYDROCHLORIDE 20 MG/1
20 TABLET, FILM COATED ORAL EVERY MORNING
Status: DISCONTINUED | OUTPATIENT
Start: 2018-06-07 | End: 2018-06-10 | Stop reason: HOSPADM

## 2018-06-07 RX ORDER — MORPHINE SULFATE 4 MG/ML
4 INJECTION, SOLUTION INTRAMUSCULAR; INTRAVENOUS ONCE
Status: COMPLETED | OUTPATIENT
Start: 2018-06-07 | End: 2018-06-07

## 2018-06-07 RX ORDER — CHLORTHALIDONE 25 MG/1
25 TABLET ORAL DAILY
Status: DISCONTINUED | OUTPATIENT
Start: 2018-06-07 | End: 2018-06-07

## 2018-06-07 RX ORDER — ONDANSETRON 2 MG/ML
4 INJECTION INTRAMUSCULAR; INTRAVENOUS ONCE
Status: COMPLETED | OUTPATIENT
Start: 2018-06-07 | End: 2018-06-07

## 2018-06-07 RX ORDER — EPLERENONE 25 MG/1
50 TABLET, FILM COATED ORAL 2 TIMES DAILY
Status: DISCONTINUED | OUTPATIENT
Start: 2018-06-07 | End: 2018-06-10 | Stop reason: HOSPADM

## 2018-06-07 RX ORDER — ACETAMINOPHEN 325 MG/1
650 TABLET ORAL EVERY 4 HOURS PRN
Status: DISCONTINUED | OUTPATIENT
Start: 2018-06-07 | End: 2018-06-10 | Stop reason: HOSPADM

## 2018-06-07 RX ORDER — ACETAMINOPHEN 325 MG/1
650 TABLET ORAL EVERY 6 HOURS PRN
COMMUNITY

## 2018-06-07 RX ORDER — HYDRALAZINE HYDROCHLORIDE 20 MG/ML
20 INJECTION INTRAMUSCULAR; INTRAVENOUS ONCE
Status: COMPLETED | OUTPATIENT
Start: 2018-06-07 | End: 2018-06-07

## 2018-06-07 RX ORDER — MORPHINE SULFATE 4 MG/ML
4 INJECTION, SOLUTION INTRAMUSCULAR; INTRAVENOUS EVERY 4 HOURS PRN
Status: ACTIVE | OUTPATIENT
Start: 2018-06-07 | End: 2018-06-08

## 2018-06-07 RX ORDER — SODIUM CHLORIDE 9 MG/ML
INJECTION, SOLUTION INTRAVENOUS CONTINUOUS
Status: DISCONTINUED | OUTPATIENT
Start: 2018-06-07 | End: 2018-06-09

## 2018-06-07 RX ORDER — ONDANSETRON 2 MG/ML
INJECTION INTRAMUSCULAR; INTRAVENOUS
Status: COMPLETED
Start: 2018-06-07 | End: 2018-06-07

## 2018-06-07 RX ORDER — POTASSIUM CHLORIDE 7.45 MG/ML
10 INJECTION INTRAVENOUS
Status: DISPENSED | OUTPATIENT
Start: 2018-06-07 | End: 2018-06-07

## 2018-06-07 RX ORDER — CLONIDINE HYDROCHLORIDE 0.1 MG/1
0.1 TABLET ORAL ONCE
Status: COMPLETED | OUTPATIENT
Start: 2018-06-07 | End: 2018-06-07

## 2018-06-07 RX ORDER — SODIUM CHLORIDE 9 MG/ML
INJECTION, SOLUTION INTRAVENOUS CONTINUOUS
Status: DISCONTINUED | OUTPATIENT
Start: 2018-06-07 | End: 2018-06-07

## 2018-06-07 RX ORDER — DOCUSATE SODIUM 100 MG/1
100 CAPSULE, LIQUID FILLED ORAL 2 TIMES DAILY
Status: DISCONTINUED | OUTPATIENT
Start: 2018-06-07 | End: 2018-06-10 | Stop reason: HOSPADM

## 2018-06-07 RX ORDER — ONDANSETRON 2 MG/ML
4 INJECTION INTRAMUSCULAR; INTRAVENOUS EVERY 6 HOURS PRN
Status: DISCONTINUED | OUTPATIENT
Start: 2018-06-07 | End: 2018-06-10 | Stop reason: HOSPADM

## 2018-06-07 RX ADMIN — LISINOPRIL 20 MG: 20 TABLET ORAL at 14:09

## 2018-06-07 RX ADMIN — LISINOPRIL 20 MG: 20 TABLET ORAL at 20:28

## 2018-06-07 RX ADMIN — METOPROLOL SUCCINATE 100 MG: 100 TABLET, FILM COATED, EXTENDED RELEASE ORAL at 14:09

## 2018-06-07 RX ADMIN — CLONIDINE HYDROCHLORIDE 0.1 MG: 0.1 TABLET ORAL at 00:55

## 2018-06-07 RX ADMIN — IOPAMIDOL 80 ML: 755 INJECTION, SOLUTION INTRAVENOUS at 01:54

## 2018-06-07 RX ADMIN — POTASSIUM CHLORIDE 10 MEQ: 10 INJECTION, SOLUTION INTRAVENOUS at 02:11

## 2018-06-07 RX ADMIN — ONDANSETRON 4 MG: 2 INJECTION INTRAMUSCULAR; INTRAVENOUS at 00:55

## 2018-06-07 RX ADMIN — PROMETHAZINE HYDROCHLORIDE 25 MG: 25 INJECTION INTRAMUSCULAR; INTRAVENOUS at 02:43

## 2018-06-07 RX ADMIN — ACETAMINOPHEN 650 MG: 325 TABLET ORAL at 23:51

## 2018-06-07 RX ADMIN — PAROXETINE HYDROCHLORIDE 20 MG: 20 TABLET, FILM COATED ORAL at 14:09

## 2018-06-07 RX ADMIN — SODIUM CHLORIDE 1000 ML: 9 INJECTION, SOLUTION INTRAVENOUS at 01:14

## 2018-06-07 RX ADMIN — POTASSIUM CHLORIDE 40 MEQ: 750 TABLET, FILM COATED, EXTENDED RELEASE ORAL at 01:14

## 2018-06-07 RX ADMIN — HYDRALAZINE HYDROCHLORIDE 20 MG: 20 INJECTION INTRAMUSCULAR; INTRAVENOUS at 02:11

## 2018-06-07 RX ADMIN — EPLERENONE 50 MG: 25 TABLET, FILM COATED ORAL at 14:09

## 2018-06-07 RX ADMIN — SODIUM CHLORIDE: 9 INJECTION, SOLUTION INTRAVENOUS at 04:22

## 2018-06-07 RX ADMIN — EPLERENONE 50 MG: 25 TABLET, FILM COATED ORAL at 20:28

## 2018-06-07 RX ADMIN — ACETAMINOPHEN 650 MG: 325 TABLET ORAL at 14:09

## 2018-06-07 RX ADMIN — MORPHINE SULFATE 4 MG: 4 INJECTION, SOLUTION INTRAMUSCULAR; INTRAVENOUS at 02:48

## 2018-06-07 RX ADMIN — POTASSIUM CHLORIDE, DEXTROSE MONOHYDRATE AND SODIUM CHLORIDE: 150; 5; 200 INJECTION, SOLUTION INTRAVENOUS at 10:53

## 2018-06-07 RX ADMIN — MORPHINE SULFATE 4 MG: 4 INJECTION, SOLUTION INTRAMUSCULAR; INTRAVENOUS at 03:23

## 2018-06-07 RX ADMIN — PROMETHAZINE HYDROCHLORIDE 25 MG: 25 INJECTION INTRAMUSCULAR; INTRAVENOUS at 01:14

## 2018-06-07 ASSESSMENT — ENCOUNTER SYMPTOMS
STRIDOR: 0
COUGH: 0
CHOKING: 0
VOMITING: 0
EYE ITCHING: 0
APNEA: 0
ABDOMINAL DISTENTION: 0
RHINORRHEA: 0
PHOTOPHOBIA: 0
CONSTIPATION: 0
SHORTNESS OF BREATH: 0
ANAL BLEEDING: 0
ABDOMINAL PAIN: 1
DIARRHEA: 0
EYE DISCHARGE: 0
BLOOD IN STOOL: 0
SORE THROAT: 0
WHEEZING: 0
EYE PAIN: 0
COLOR CHANGE: 0
BACK PAIN: 0
EYE REDNESS: 0
TROUBLE SWALLOWING: 0
VOICE CHANGE: 0
SINUS PRESSURE: 0
RECTAL PAIN: 0
CHEST TIGHTNESS: 0
NAUSEA: 0

## 2018-06-07 ASSESSMENT — PAIN SCALES - GENERAL
PAINLEVEL_OUTOF10: 7
PAINLEVEL_OUTOF10: 2
PAINLEVEL_OUTOF10: 0
PAINLEVEL_OUTOF10: 4
PAINLEVEL_OUTOF10: 7
PAINLEVEL_OUTOF10: 3
PAINLEVEL_OUTOF10: 5
PAINLEVEL_OUTOF10: 3
PAINLEVEL_OUTOF10: 3
PAINLEVEL_OUTOF10: 0

## 2018-06-07 ASSESSMENT — PAIN DESCRIPTION - LOCATION
LOCATION: ABDOMEN
LOCATION: CHEST;ABDOMEN
LOCATION: ABDOMEN
LOCATION: HEAD
LOCATION: ABDOMEN;STERNUM

## 2018-06-07 ASSESSMENT — PAIN DESCRIPTION - PAIN TYPE
TYPE: ACUTE PAIN

## 2018-06-07 ASSESSMENT — PAIN DESCRIPTION - ORIENTATION
ORIENTATION: MID
ORIENTATION: MID

## 2018-06-07 ASSESSMENT — PAIN DESCRIPTION - DESCRIPTORS
DESCRIPTORS: ACHING
DESCRIPTORS: CONSTANT

## 2018-06-08 ENCOUNTER — ANESTHESIA EVENT (OUTPATIENT)
Dept: OPERATING ROOM | Age: 63
DRG: 418 | End: 2018-06-08
Payer: COMMERCIAL

## 2018-06-08 ENCOUNTER — APPOINTMENT (OUTPATIENT)
Dept: NUCLEAR MEDICINE | Age: 63
DRG: 418 | End: 2018-06-08
Payer: COMMERCIAL

## 2018-06-08 PROBLEM — R94.8 ABNORMAL BILIARY HIDA SCAN: Status: ACTIVE | Noted: 2018-06-08

## 2018-06-08 PROBLEM — K80.10 CHOLECYSTITIS WITH CHOLELITHIASIS: Status: ACTIVE | Noted: 2018-06-08

## 2018-06-08 LAB
ANION GAP SERPL CALCULATED.3IONS-SCNC: 13 MEQ/L (ref 8–16)
ANISOCYTOSIS: ABNORMAL
BASOPHILS # BLD: 1.8 %
BASOPHILS ABSOLUTE: 0.2 THOU/MM3 (ref 0–0.1)
BUN BLDV-MCNC: 13 MG/DL (ref 7–22)
CALCIUM SERPL-MCNC: 8.7 MG/DL (ref 8.5–10.5)
CHLORIDE BLD-SCNC: 99 MEQ/L (ref 98–111)
CO2: 27 MEQ/L (ref 23–33)
CREAT SERPL-MCNC: 1.2 MG/DL (ref 0.4–1.2)
EOSINOPHIL # BLD: 0.3 %
EOSINOPHILS ABSOLUTE: 0 THOU/MM3 (ref 0–0.4)
GFR SERPL CREATININE-BSD FRML MDRD: 62 ML/MIN/1.73M2
GLUCOSE BLD-MCNC: 121 MG/DL (ref 70–108)
HCT VFR BLD CALC: 43.5 % (ref 42–52)
HEMOGLOBIN: 14.8 GM/DL (ref 14–18)
LV EF: 55 %
LVEF MODALITY: NORMAL
LYMPHOCYTES # BLD: 21 %
LYMPHOCYTES ABSOLUTE: 2.4 THOU/MM3 (ref 1–4.8)
MAGNESIUM: 2 MG/DL (ref 1.6–2.4)
MCH RBC QN AUTO: 29.7 PG (ref 27–31)
MCHC RBC AUTO-ENTMCNC: 34.1 GM/DL (ref 33–37)
MCV RBC AUTO: 87 FL (ref 80–94)
MONOCYTES # BLD: 10.5 %
MONOCYTES ABSOLUTE: 1.2 THOU/MM3 (ref 0.4–1.3)
NUCLEATED RED BLOOD CELLS: 0 /100 WBC
PDW BLD-RTO: 15.1 % (ref 11.5–14.5)
PLATELET # BLD: 226 THOU/MM3 (ref 130–400)
PMV BLD AUTO: 8.9 FL (ref 7.4–10.4)
POTASSIUM REFLEX MAGNESIUM: 3.5 MEQ/L (ref 3.5–5.2)
POTASSIUM SERPL-SCNC: 3.5 MEQ/L (ref 3.5–5.2)
RBC # BLD: 5 MILL/MM3 (ref 4.7–6.1)
SEG NEUTROPHILS: 66.4 %
SEGMENTED NEUTROPHILS ABSOLUTE COUNT: 7.5 THOU/MM3 (ref 1.8–7.7)
SODIUM BLD-SCNC: 139 MEQ/L (ref 135–145)
WBC # BLD: 11.3 THOU/MM3 (ref 4.8–10.8)

## 2018-06-08 PROCEDURE — 6360000002 HC RX W HCPCS: Performed by: RADIOLOGY

## 2018-06-08 PROCEDURE — S0028 INJECTION, FAMOTIDINE, 20 MG: HCPCS | Performed by: INTERNAL MEDICINE

## 2018-06-08 PROCEDURE — 6360000002 HC RX W HCPCS

## 2018-06-08 PROCEDURE — 6370000000 HC RX 637 (ALT 250 FOR IP): Performed by: INTERNAL MEDICINE

## 2018-06-08 PROCEDURE — 83735 ASSAY OF MAGNESIUM: CPT

## 2018-06-08 PROCEDURE — 78226 HEPATOBILIARY SYSTEM IMAGING: CPT

## 2018-06-08 PROCEDURE — 85025 COMPLETE CBC W/AUTO DIFF WBC: CPT

## 2018-06-08 PROCEDURE — 36415 COLL VENOUS BLD VENIPUNCTURE: CPT

## 2018-06-08 PROCEDURE — A9537 TC99M MEBROFENIN: HCPCS | Performed by: INTERNAL MEDICINE

## 2018-06-08 PROCEDURE — 99222 1ST HOSP IP/OBS MODERATE 55: CPT | Performed by: INTERNAL MEDICINE

## 2018-06-08 PROCEDURE — 80048 BASIC METABOLIC PNL TOTAL CA: CPT

## 2018-06-08 PROCEDURE — 99232 SBSQ HOSP IP/OBS MODERATE 35: CPT | Performed by: INTERNAL MEDICINE

## 2018-06-08 PROCEDURE — 2500000003 HC RX 250 WO HCPCS: Performed by: INTERNAL MEDICINE

## 2018-06-08 PROCEDURE — 93306 TTE W/DOPPLER COMPLETE: CPT

## 2018-06-08 PROCEDURE — 1200000003 HC TELEMETRY R&B

## 2018-06-08 PROCEDURE — 2580000003 HC RX 258: Performed by: INTERNAL MEDICINE

## 2018-06-08 PROCEDURE — 3430000000 HC RX DIAGNOSTIC RADIOPHARMACEUTICAL: Performed by: INTERNAL MEDICINE

## 2018-06-08 RX ORDER — POTASSIUM CHLORIDE 20 MEQ/1
40 TABLET, EXTENDED RELEASE ORAL ONCE
Status: COMPLETED | OUTPATIENT
Start: 2018-06-08 | End: 2018-06-08

## 2018-06-08 RX ORDER — FAMOTIDINE 20 MG/1
20 TABLET, FILM COATED ORAL 2 TIMES DAILY
Status: DISCONTINUED | OUTPATIENT
Start: 2018-06-08 | End: 2018-06-10 | Stop reason: HOSPADM

## 2018-06-08 RX ORDER — HYDRALAZINE HYDROCHLORIDE 25 MG/1
25 TABLET, FILM COATED ORAL EVERY 8 HOURS SCHEDULED
Status: DISCONTINUED | OUTPATIENT
Start: 2018-06-08 | End: 2018-06-08

## 2018-06-08 RX ORDER — MORPHINE SULFATE 4 MG/ML
0.04 INJECTION, SOLUTION INTRAMUSCULAR; INTRAVENOUS ONCE
Status: COMPLETED | OUTPATIENT
Start: 2018-06-08 | End: 2018-06-08

## 2018-06-08 RX ORDER — HYDRALAZINE HYDROCHLORIDE 25 MG/1
25 TABLET, FILM COATED ORAL 3 TIMES DAILY PRN
Status: DISCONTINUED | OUTPATIENT
Start: 2018-06-08 | End: 2018-06-10 | Stop reason: HOSPADM

## 2018-06-08 RX ADMIN — MORPHINE SULFATE 4 MG: 4 INJECTION INTRAVENOUS at 08:46

## 2018-06-08 RX ADMIN — FAMOTIDINE 20 MG: 10 INJECTION, SOLUTION INTRAVENOUS at 10:56

## 2018-06-08 RX ADMIN — EPLERENONE 50 MG: 25 TABLET, FILM COATED ORAL at 10:50

## 2018-06-08 RX ADMIN — PAROXETINE HYDROCHLORIDE 20 MG: 20 TABLET, FILM COATED ORAL at 10:48

## 2018-06-08 RX ADMIN — LISINOPRIL 20 MG: 20 TABLET ORAL at 10:49

## 2018-06-08 RX ADMIN — MEBROFENIN 8.4 MILLICURIE: 45 INJECTION, POWDER, LYOPHILIZED, FOR SOLUTION INTRAVENOUS at 07:02

## 2018-06-08 RX ADMIN — POTASSIUM CHLORIDE, DEXTROSE MONOHYDRATE AND SODIUM CHLORIDE: 150; 5; 200 INJECTION, SOLUTION INTRAVENOUS at 16:01

## 2018-06-08 RX ADMIN — POTASSIUM CHLORIDE, DEXTROSE MONOHYDRATE AND SODIUM CHLORIDE: 150; 5; 200 INJECTION, SOLUTION INTRAVENOUS at 01:43

## 2018-06-08 RX ADMIN — Medication 10 ML: at 19:35

## 2018-06-08 RX ADMIN — EPLERENONE 50 MG: 25 TABLET, FILM COATED ORAL at 19:35

## 2018-06-08 RX ADMIN — METOPROLOL SUCCINATE 100 MG: 100 TABLET, FILM COATED, EXTENDED RELEASE ORAL at 10:49

## 2018-06-08 RX ADMIN — ACETAMINOPHEN 650 MG: 325 TABLET ORAL at 10:53

## 2018-06-08 RX ADMIN — LISINOPRIL 20 MG: 20 TABLET ORAL at 19:35

## 2018-06-08 RX ADMIN — POTASSIUM CHLORIDE 40 MEQ: 20 TABLET, EXTENDED RELEASE ORAL at 10:49

## 2018-06-08 RX ADMIN — ACETAMINOPHEN 650 MG: 325 TABLET ORAL at 20:09

## 2018-06-08 ASSESSMENT — ENCOUNTER SYMPTOMS
RECTAL PAIN: 0
COUGH: 0
EYE PAIN: 0
EYE REDNESS: 0
EYE ITCHING: 0
NAUSEA: 1
SHORTNESS OF BREATH: 0
DIARRHEA: 0
RHINORRHEA: 0
WHEEZING: 0
CHEST TIGHTNESS: 0
SINUS PRESSURE: 0
APNEA: 0
SORE THROAT: 0
ANAL BLEEDING: 0
TROUBLE SWALLOWING: 0
VOMITING: 0
ABDOMINAL PAIN: 1
BACK PAIN: 0
COLOR CHANGE: 0
CONSTIPATION: 0
ABDOMINAL DISTENTION: 0
PHOTOPHOBIA: 0
VOICE CHANGE: 0
STRIDOR: 0

## 2018-06-08 ASSESSMENT — PAIN SCALES - GENERAL
PAINLEVEL_OUTOF10: 4
PAINLEVEL_OUTOF10: 4
PAINLEVEL_OUTOF10: 3
PAINLEVEL_OUTOF10: 0

## 2018-06-08 ASSESSMENT — PAIN DESCRIPTION - DESCRIPTORS
DESCRIPTORS: ACHING
DESCRIPTORS: ACHING

## 2018-06-08 ASSESSMENT — PAIN DESCRIPTION - LOCATION
LOCATION: GENERALIZED
LOCATION: HEAD

## 2018-06-08 ASSESSMENT — PAIN DESCRIPTION - PAIN TYPE
TYPE: ACUTE PAIN
TYPE: ACUTE PAIN

## 2018-06-08 ASSESSMENT — PAIN DESCRIPTION - ORIENTATION: ORIENTATION: MID

## 2018-06-09 ENCOUNTER — ANESTHESIA (OUTPATIENT)
Dept: OPERATING ROOM | Age: 63
DRG: 418 | End: 2018-06-09
Payer: COMMERCIAL

## 2018-06-09 VITALS
RESPIRATION RATE: 12 BRPM | TEMPERATURE: 98.6 F | OXYGEN SATURATION: 98 % | DIASTOLIC BLOOD PRESSURE: 101 MMHG | SYSTOLIC BLOOD PRESSURE: 180 MMHG

## 2018-06-09 PROBLEM — Z90.49 S/P LAPAROSCOPIC CHOLECYSTECTOMY: Status: ACTIVE | Noted: 2018-06-09

## 2018-06-09 LAB
ALBUMIN SERPL-MCNC: 3.4 G/DL (ref 3.5–5.1)
ALP BLD-CCNC: 56 U/L (ref 38–126)
ALT SERPL-CCNC: 23 U/L (ref 11–66)
ANION GAP SERPL CALCULATED.3IONS-SCNC: 10 MEQ/L (ref 8–16)
APTT: 33.4 SECONDS (ref 22–38)
AST SERPL-CCNC: 26 U/L (ref 5–40)
BILIRUB SERPL-MCNC: 2.2 MG/DL (ref 0.3–1.2)
BUN BLDV-MCNC: 10 MG/DL (ref 7–22)
CALCIUM SERPL-MCNC: 8.7 MG/DL (ref 8.5–10.5)
CHLORIDE BLD-SCNC: 100 MEQ/L (ref 98–111)
CO2: 30 MEQ/L (ref 23–33)
CREAT SERPL-MCNC: 1.2 MG/DL (ref 0.4–1.2)
GFR SERPL CREATININE-BSD FRML MDRD: 61 ML/MIN/1.73M2
GLUCOSE BLD-MCNC: 119 MG/DL (ref 70–108)
HCT VFR BLD CALC: 44 % (ref 42–52)
HEMOGLOBIN: 15.2 GM/DL (ref 14–18)
INR BLD: 1.18 (ref 0.85–1.13)
MAGNESIUM: 2.1 MG/DL (ref 1.6–2.4)
MCH RBC QN AUTO: 30 PG (ref 27–31)
MCHC RBC AUTO-ENTMCNC: 34.5 GM/DL (ref 33–37)
MCV RBC AUTO: 87 FL (ref 80–94)
PDW BLD-RTO: 15 % (ref 11.5–14.5)
PLATELET # BLD: 223 THOU/MM3 (ref 130–400)
PMV BLD AUTO: 9 FL (ref 7.4–10.4)
POTASSIUM SERPL-SCNC: 3.7 MEQ/L (ref 3.5–5.2)
POTASSIUM SERPL-SCNC: 4.7 MEQ/L (ref 3.5–5.2)
RBC # BLD: 5.06 MILL/MM3 (ref 4.7–6.1)
SODIUM BLD-SCNC: 140 MEQ/L (ref 135–145)
TOTAL PROTEIN: 6.8 G/DL (ref 6.1–8)
WBC # BLD: 9.7 THOU/MM3 (ref 4.8–10.8)

## 2018-06-09 PROCEDURE — 6360000002 HC RX W HCPCS: Performed by: SURGERY

## 2018-06-09 PROCEDURE — 3700000000 HC ANESTHESIA ATTENDED CARE: Performed by: SURGERY

## 2018-06-09 PROCEDURE — 2580000003 HC RX 258: Performed by: INTERNAL MEDICINE

## 2018-06-09 PROCEDURE — 88304 TISSUE EXAM BY PATHOLOGIST: CPT

## 2018-06-09 PROCEDURE — 7100000000 HC PACU RECOVERY - FIRST 15 MIN: Performed by: SURGERY

## 2018-06-09 PROCEDURE — 7100000001 HC PACU RECOVERY - ADDTL 15 MIN: Performed by: SURGERY

## 2018-06-09 PROCEDURE — 6360000002 HC RX W HCPCS

## 2018-06-09 PROCEDURE — 85027 COMPLETE CBC AUTOMATED: CPT

## 2018-06-09 PROCEDURE — 0FT44ZZ RESECTION OF GALLBLADDER, PERCUTANEOUS ENDOSCOPIC APPROACH: ICD-10-PCS | Performed by: SURGERY

## 2018-06-09 PROCEDURE — 85610 PROTHROMBIN TIME: CPT

## 2018-06-09 PROCEDURE — 2580000003 HC RX 258: Performed by: SURGERY

## 2018-06-09 PROCEDURE — 3600000003 HC SURGERY LEVEL 3 BASE: Performed by: SURGERY

## 2018-06-09 PROCEDURE — 6370000000 HC RX 637 (ALT 250 FOR IP): Performed by: INTERNAL MEDICINE

## 2018-06-09 PROCEDURE — 36415 COLL VENOUS BLD VENIPUNCTURE: CPT

## 2018-06-09 PROCEDURE — 83735 ASSAY OF MAGNESIUM: CPT

## 2018-06-09 PROCEDURE — 99232 SBSQ HOSP IP/OBS MODERATE 35: CPT | Performed by: INTERNAL MEDICINE

## 2018-06-09 PROCEDURE — 47562 LAPAROSCOPIC CHOLECYSTECTOMY: CPT | Performed by: SURGERY

## 2018-06-09 PROCEDURE — 2500000003 HC RX 250 WO HCPCS: Performed by: SURGERY

## 2018-06-09 PROCEDURE — 6360000002 HC RX W HCPCS: Performed by: NURSE ANESTHETIST, CERTIFIED REGISTERED

## 2018-06-09 PROCEDURE — 3600000013 HC SURGERY LEVEL 3 ADDTL 15MIN: Performed by: SURGERY

## 2018-06-09 PROCEDURE — 84132 ASSAY OF SERUM POTASSIUM: CPT

## 2018-06-09 PROCEDURE — 1200000003 HC TELEMETRY R&B

## 2018-06-09 PROCEDURE — 80053 COMPREHEN METABOLIC PANEL: CPT

## 2018-06-09 PROCEDURE — 6360000002 HC RX W HCPCS: Performed by: ANESTHESIOLOGY

## 2018-06-09 PROCEDURE — 2500000003 HC RX 250 WO HCPCS: Performed by: NURSE ANESTHETIST, CERTIFIED REGISTERED

## 2018-06-09 PROCEDURE — 2500000003 HC RX 250 WO HCPCS: Performed by: INTERNAL MEDICINE

## 2018-06-09 PROCEDURE — 3700000001 HC ADD 15 MINUTES (ANESTHESIA): Performed by: SURGERY

## 2018-06-09 PROCEDURE — 85730 THROMBOPLASTIN TIME PARTIAL: CPT

## 2018-06-09 PROCEDURE — 2780000010 HC IMPLANT OTHER: Performed by: SURGERY

## 2018-06-09 PROCEDURE — 2580000003 HC RX 258: Performed by: NURSE ANESTHETIST, CERTIFIED REGISTERED

## 2018-06-09 RX ORDER — PROPOFOL 10 MG/ML
INJECTION, EMULSION INTRAVENOUS PRN
Status: DISCONTINUED | OUTPATIENT
Start: 2018-06-09 | End: 2018-06-09 | Stop reason: SDUPTHER

## 2018-06-09 RX ORDER — FENTANYL CITRATE 50 UG/ML
INJECTION, SOLUTION INTRAMUSCULAR; INTRAVENOUS
Status: COMPLETED
Start: 2018-06-09 | End: 2018-06-09

## 2018-06-09 RX ORDER — LIDOCAINE HYDROCHLORIDE 20 MG/ML
INJECTION, SOLUTION INFILTRATION; PERINEURAL PRN
Status: DISCONTINUED | OUTPATIENT
Start: 2018-06-09 | End: 2018-06-09 | Stop reason: SDUPTHER

## 2018-06-09 RX ORDER — NEOSTIGMINE METHYLSULFATE 1 MG/ML
INJECTION, SOLUTION INTRAVENOUS PRN
Status: DISCONTINUED | OUTPATIENT
Start: 2018-06-09 | End: 2018-06-09 | Stop reason: SDUPTHER

## 2018-06-09 RX ORDER — HYDROMORPHONE HCL 110MG/55ML
PATIENT CONTROLLED ANALGESIA SYRINGE INTRAVENOUS PRN
Status: DISCONTINUED | OUTPATIENT
Start: 2018-06-09 | End: 2018-06-09 | Stop reason: SDUPTHER

## 2018-06-09 RX ORDER — BUPIVACAINE HYDROCHLORIDE AND EPINEPHRINE 5; 5 MG/ML; UG/ML
INJECTION, SOLUTION EPIDURAL; INTRACAUDAL; PERINEURAL PRN
Status: DISCONTINUED | OUTPATIENT
Start: 2018-06-09 | End: 2018-06-09 | Stop reason: HOSPADM

## 2018-06-09 RX ORDER — ROCURONIUM BROMIDE 10 MG/ML
INJECTION, SOLUTION INTRAVENOUS PRN
Status: DISCONTINUED | OUTPATIENT
Start: 2018-06-09 | End: 2018-06-09 | Stop reason: SDUPTHER

## 2018-06-09 RX ORDER — SODIUM CHLORIDE 9 MG/ML
INJECTION, SOLUTION INTRAVENOUS CONTINUOUS PRN
Status: DISCONTINUED | OUTPATIENT
Start: 2018-06-09 | End: 2018-06-09 | Stop reason: SDUPTHER

## 2018-06-09 RX ORDER — HYDROCODONE BITARTRATE AND ACETAMINOPHEN 5; 325 MG/1; MG/1
1 TABLET ORAL EVERY 4 HOURS PRN
Status: DISCONTINUED | OUTPATIENT
Start: 2018-06-09 | End: 2018-06-10 | Stop reason: HOSPADM

## 2018-06-09 RX ORDER — FENTANYL CITRATE 50 UG/ML
25 INJECTION, SOLUTION INTRAMUSCULAR; INTRAVENOUS EVERY 5 MIN PRN
Status: COMPLETED | OUTPATIENT
Start: 2018-06-09 | End: 2018-06-09

## 2018-06-09 RX ORDER — DEXAMETHASONE SODIUM PHOSPHATE 4 MG/ML
INJECTION, SOLUTION INTRA-ARTICULAR; INTRALESIONAL; INTRAMUSCULAR; INTRAVENOUS; SOFT TISSUE PRN
Status: DISCONTINUED | OUTPATIENT
Start: 2018-06-09 | End: 2018-06-09 | Stop reason: SDUPTHER

## 2018-06-09 RX ORDER — SODIUM CHLORIDE 0.9 % (FLUSH) 0.9 %
10 SYRINGE (ML) INJECTION EVERY 12 HOURS SCHEDULED
Status: DISCONTINUED | OUTPATIENT
Start: 2018-06-09 | End: 2018-06-09 | Stop reason: HOSPADM

## 2018-06-09 RX ORDER — KETAMINE HYDROCHLORIDE 50 MG/ML
INJECTION, SOLUTION, CONCENTRATE INTRAMUSCULAR; INTRAVENOUS PRN
Status: DISCONTINUED | OUTPATIENT
Start: 2018-06-09 | End: 2018-06-09 | Stop reason: SDUPTHER

## 2018-06-09 RX ORDER — GLYCOPYRROLATE 1 MG/5 ML
SYRINGE (ML) INTRAVENOUS PRN
Status: DISCONTINUED | OUTPATIENT
Start: 2018-06-09 | End: 2018-06-09 | Stop reason: SDUPTHER

## 2018-06-09 RX ORDER — LABETALOL HYDROCHLORIDE 5 MG/ML
INJECTION, SOLUTION INTRAVENOUS PRN
Status: DISCONTINUED | OUTPATIENT
Start: 2018-06-09 | End: 2018-06-09 | Stop reason: SDUPTHER

## 2018-06-09 RX ORDER — ONDANSETRON 2 MG/ML
INJECTION INTRAMUSCULAR; INTRAVENOUS PRN
Status: DISCONTINUED | OUTPATIENT
Start: 2018-06-09 | End: 2018-06-09 | Stop reason: SDUPTHER

## 2018-06-09 RX ORDER — HYDROCODONE BITARTRATE AND ACETAMINOPHEN 5; 325 MG/1; MG/1
2 TABLET ORAL EVERY 4 HOURS PRN
Status: DISCONTINUED | OUTPATIENT
Start: 2018-06-09 | End: 2018-06-10 | Stop reason: HOSPADM

## 2018-06-09 RX ORDER — FENTANYL CITRATE 50 UG/ML
INJECTION, SOLUTION INTRAMUSCULAR; INTRAVENOUS PRN
Status: DISCONTINUED | OUTPATIENT
Start: 2018-06-09 | End: 2018-06-09 | Stop reason: SDUPTHER

## 2018-06-09 RX ORDER — SODIUM CHLORIDE 0.9 % (FLUSH) 0.9 %
10 SYRINGE (ML) INJECTION PRN
Status: DISCONTINUED | OUTPATIENT
Start: 2018-06-09 | End: 2018-06-09 | Stop reason: HOSPADM

## 2018-06-09 RX ORDER — SUCCINYLCHOLINE CHLORIDE 20 MG/ML
INJECTION INTRAMUSCULAR; INTRAVENOUS PRN
Status: DISCONTINUED | OUTPATIENT
Start: 2018-06-09 | End: 2018-06-09 | Stop reason: SDUPTHER

## 2018-06-09 RX ADMIN — FENTANYL CITRATE 25 MCG: 50 INJECTION, SOLUTION INTRAMUSCULAR; INTRAVENOUS at 10:18

## 2018-06-09 RX ADMIN — LABETALOL HYDROCHLORIDE 10 MG: 5 INJECTION INTRAVENOUS at 09:42

## 2018-06-09 RX ADMIN — ONDANSETRON HYDROCHLORIDE 4 MG: 4 INJECTION, SOLUTION INTRAMUSCULAR; INTRAVENOUS at 09:21

## 2018-06-09 RX ADMIN — LISINOPRIL 20 MG: 20 TABLET ORAL at 11:57

## 2018-06-09 RX ADMIN — SODIUM CHLORIDE: 9 INJECTION, SOLUTION INTRAVENOUS at 10:42

## 2018-06-09 RX ADMIN — DOCUSATE SODIUM 100 MG: 100 CAPSULE, LIQUID FILLED ORAL at 11:57

## 2018-06-09 RX ADMIN — Medication 0.2 MG: at 09:46

## 2018-06-09 RX ADMIN — KETAMINE HYDROCHLORIDE 50 MG: 50 INJECTION, SOLUTION INTRAMUSCULAR; INTRAVENOUS at 09:18

## 2018-06-09 RX ADMIN — NEOSTIGMINE METHYLSULFATE 1 MG: 1 INJECTION, SOLUTION INTRAVENOUS at 09:46

## 2018-06-09 RX ADMIN — FENTANYL CITRATE 25 MCG: 50 INJECTION, SOLUTION INTRAMUSCULAR; INTRAVENOUS at 10:30

## 2018-06-09 RX ADMIN — LISINOPRIL 20 MG: 20 TABLET ORAL at 19:41

## 2018-06-09 RX ADMIN — CEFOXITIN SODIUM 1 G: 1 POWDER, FOR SOLUTION INTRAVENOUS at 14:33

## 2018-06-09 RX ADMIN — CEFOXITIN 2 G: 2 INJECTION, POWDER, FOR SOLUTION INTRAVENOUS at 09:21

## 2018-06-09 RX ADMIN — HYDROMORPHONE HYDROCHLORIDE 0.5 MG: 2 INJECTION INTRAMUSCULAR; INTRAVENOUS; SUBCUTANEOUS at 09:49

## 2018-06-09 RX ADMIN — PAROXETINE HYDROCHLORIDE 20 MG: 20 TABLET, FILM COATED ORAL at 11:57

## 2018-06-09 RX ADMIN — LIDOCAINE HYDROCHLORIDE 100 MG: 20 INJECTION, SOLUTION INFILTRATION; PERINEURAL at 09:18

## 2018-06-09 RX ADMIN — SODIUM CHLORIDE: 9 INJECTION, SOLUTION INTRAVENOUS at 09:18

## 2018-06-09 RX ADMIN — EPLERENONE 50 MG: 25 TABLET, FILM COATED ORAL at 19:42

## 2018-06-09 RX ADMIN — DEXAMETHASONE SODIUM PHOSPHATE 4 MG: 4 INJECTION, SOLUTION INTRAMUSCULAR; INTRAVENOUS at 09:21

## 2018-06-09 RX ADMIN — FENTANYL CITRATE 25 MCG: 50 INJECTION, SOLUTION INTRAMUSCULAR; INTRAVENOUS at 10:35

## 2018-06-09 RX ADMIN — FENTANYL CITRATE 100 MCG: 50 INJECTION INTRAMUSCULAR; INTRAVENOUS at 09:18

## 2018-06-09 RX ADMIN — NEOSTIGMINE METHYLSULFATE 4 MG: 1 INJECTION, SOLUTION INTRAVENOUS at 09:42

## 2018-06-09 RX ADMIN — HYDROMORPHONE HYDROCHLORIDE 0.5 MG: 2 INJECTION INTRAMUSCULAR; INTRAVENOUS; SUBCUTANEOUS at 09:55

## 2018-06-09 RX ADMIN — METOPROLOL SUCCINATE 100 MG: 100 TABLET, FILM COATED, EXTENDED RELEASE ORAL at 11:56

## 2018-06-09 RX ADMIN — SUGAMMADEX 200 MG: 100 INJECTION, SOLUTION INTRAVENOUS at 09:51

## 2018-06-09 RX ADMIN — FAMOTIDINE 20 MG: 20 TABLET ORAL at 11:57

## 2018-06-09 RX ADMIN — CEFOXITIN SODIUM 1 G: 1 POWDER, FOR SOLUTION INTRAVENOUS at 19:41

## 2018-06-09 RX ADMIN — PROPOFOL 150 MG: 10 INJECTION, EMULSION INTRAVENOUS at 09:18

## 2018-06-09 RX ADMIN — ACETAMINOPHEN 650 MG: 325 TABLET ORAL at 14:37

## 2018-06-09 RX ADMIN — Medication 160 MG: at 09:18

## 2018-06-09 RX ADMIN — EPLERENONE 50 MG: 25 TABLET, FILM COATED ORAL at 11:58

## 2018-06-09 RX ADMIN — Medication 0.6 MG: at 09:42

## 2018-06-09 RX ADMIN — ROCURONIUM BROMIDE 35 MG: 10 INJECTION, SOLUTION INTRAVENOUS at 09:26

## 2018-06-09 RX ADMIN — POTASSIUM CHLORIDE, DEXTROSE MONOHYDRATE AND SODIUM CHLORIDE: 150; 5; 200 INJECTION, SOLUTION INTRAVENOUS at 07:40

## 2018-06-09 RX ADMIN — FENTANYL CITRATE 25 MCG: 50 INJECTION, SOLUTION INTRAMUSCULAR; INTRAVENOUS at 10:25

## 2018-06-09 ASSESSMENT — PULMONARY FUNCTION TESTS
PIF_VALUE: 0
PIF_VALUE: 22
PIF_VALUE: 2
PIF_VALUE: 12
PIF_VALUE: 18
PIF_VALUE: 21
PIF_VALUE: 17
PIF_VALUE: 20
PIF_VALUE: 0
PIF_VALUE: 19
PIF_VALUE: 3
PIF_VALUE: 1
PIF_VALUE: 18
PIF_VALUE: 16
PIF_VALUE: 25
PIF_VALUE: 19
PIF_VALUE: 17
PIF_VALUE: 8
PIF_VALUE: 22
PIF_VALUE: 17
PIF_VALUE: 22
PIF_VALUE: 21
PIF_VALUE: 0
PIF_VALUE: 17
PIF_VALUE: 5
PIF_VALUE: 18
PIF_VALUE: 17
PIF_VALUE: 22
PIF_VALUE: 22
PIF_VALUE: 17
PIF_VALUE: 15
PIF_VALUE: 21
PIF_VALUE: 15
PIF_VALUE: 0
PIF_VALUE: 22
PIF_VALUE: 17
PIF_VALUE: 0
PIF_VALUE: 23
PIF_VALUE: 17
PIF_VALUE: 22

## 2018-06-09 ASSESSMENT — ENCOUNTER SYMPTOMS
ABDOMINAL PAIN: 1
TROUBLE SWALLOWING: 0
RHINORRHEA: 0
RECTAL PAIN: 0
APNEA: 0
SHORTNESS OF BREATH: 0
BACK PAIN: 0
CHEST TIGHTNESS: 0
ANAL BLEEDING: 0
SINUS PRESSURE: 0
COUGH: 0
SORE THROAT: 0
NAUSEA: 1
CONSTIPATION: 0
VOMITING: 0
PHOTOPHOBIA: 0
WHEEZING: 0
EYE PAIN: 0
STRIDOR: 0
EYE REDNESS: 0
EYE ITCHING: 0
VOICE CHANGE: 0
DIARRHEA: 0
COLOR CHANGE: 0
ABDOMINAL DISTENTION: 0

## 2018-06-09 ASSESSMENT — PAIN DESCRIPTION - ORIENTATION: ORIENTATION: MID

## 2018-06-09 ASSESSMENT — PAIN DESCRIPTION - LOCATION
LOCATION: HEAD;ABDOMEN
LOCATION: ABDOMEN

## 2018-06-09 ASSESSMENT — PAIN DESCRIPTION - PROGRESSION
CLINICAL_PROGRESSION: NOT CHANGED
CLINICAL_PROGRESSION: GRADUALLY WORSENING
CLINICAL_PROGRESSION: GRADUALLY IMPROVING

## 2018-06-09 ASSESSMENT — PAIN SCALES - GENERAL
PAINLEVEL_OUTOF10: 4
PAINLEVEL_OUTOF10: 4
PAINLEVEL_OUTOF10: 1
PAINLEVEL_OUTOF10: 0
PAINLEVEL_OUTOF10: 0
PAINLEVEL_OUTOF10: 3
PAINLEVEL_OUTOF10: 4
PAINLEVEL_OUTOF10: 4
PAINLEVEL_OUTOF10: 2
PAINLEVEL_OUTOF10: 0

## 2018-06-09 ASSESSMENT — PAIN DESCRIPTION - DESCRIPTORS
DESCRIPTORS: ACHING;SORE
DESCRIPTORS: SORE
DESCRIPTORS: SORE

## 2018-06-09 ASSESSMENT — PAIN DESCRIPTION - ONSET
ONSET: ON-GOING

## 2018-06-09 ASSESSMENT — PAIN DESCRIPTION - PAIN TYPE
TYPE: SURGICAL PAIN
TYPE: SURGICAL PAIN;ACUTE PAIN

## 2018-06-09 ASSESSMENT — PAIN SCALES - WONG BAKER: WONGBAKER_NUMERICALRESPONSE: 2

## 2018-06-10 ENCOUNTER — PATIENT MESSAGE (OUTPATIENT)
Dept: NEPHROLOGY | Age: 63
End: 2018-06-10

## 2018-06-10 VITALS
DIASTOLIC BLOOD PRESSURE: 87 MMHG | HEIGHT: 70 IN | WEIGHT: 226.9 LBS | OXYGEN SATURATION: 96 % | TEMPERATURE: 98.7 F | BODY MASS INDEX: 32.48 KG/M2 | HEART RATE: 61 BPM | SYSTOLIC BLOOD PRESSURE: 150 MMHG | RESPIRATION RATE: 18 BRPM

## 2018-06-10 PROBLEM — I51.89 DIASTOLIC DYSFUNCTION WITHOUT HEART FAILURE: Status: ACTIVE | Noted: 2018-06-10

## 2018-06-10 LAB
ANION GAP SERPL CALCULATED.3IONS-SCNC: 12 MEQ/L (ref 8–16)
BUN BLDV-MCNC: 14 MG/DL (ref 7–22)
CALCIUM SERPL-MCNC: 8.5 MG/DL (ref 8.5–10.5)
CHLORIDE BLD-SCNC: 102 MEQ/L (ref 98–111)
CO2: 28 MEQ/L (ref 23–33)
CREAT SERPL-MCNC: 1.2 MG/DL (ref 0.4–1.2)
GFR SERPL CREATININE-BSD FRML MDRD: 61 ML/MIN/1.73M2
GLUCOSE BLD-MCNC: 129 MG/DL (ref 70–108)
HCT VFR BLD CALC: 42.4 % (ref 42–52)
HEMOGLOBIN: 14.5 GM/DL (ref 14–18)
MCH RBC QN AUTO: 29.8 PG (ref 27–31)
MCHC RBC AUTO-ENTMCNC: 34.2 GM/DL (ref 33–37)
MCV RBC AUTO: 87.1 FL (ref 80–94)
PDW BLD-RTO: 15.1 % (ref 11.5–14.5)
PLATELET # BLD: 222 THOU/MM3 (ref 130–400)
PMV BLD AUTO: 9.3 FL (ref 7.4–10.4)
POTASSIUM SERPL-SCNC: 3.9 MEQ/L (ref 3.5–5.2)
RBC # BLD: 4.86 MILL/MM3 (ref 4.7–6.1)
SODIUM BLD-SCNC: 142 MEQ/L (ref 135–145)
WBC # BLD: 10.5 THOU/MM3 (ref 4.8–10.8)

## 2018-06-10 PROCEDURE — 99232 SBSQ HOSP IP/OBS MODERATE 35: CPT | Performed by: INTERNAL MEDICINE

## 2018-06-10 PROCEDURE — 2580000003 HC RX 258: Performed by: INTERNAL MEDICINE

## 2018-06-10 PROCEDURE — 80048 BASIC METABOLIC PNL TOTAL CA: CPT

## 2018-06-10 PROCEDURE — 85027 COMPLETE CBC AUTOMATED: CPT

## 2018-06-10 PROCEDURE — 99024 POSTOP FOLLOW-UP VISIT: CPT | Performed by: SURGERY

## 2018-06-10 PROCEDURE — 6360000002 HC RX W HCPCS: Performed by: INTERNAL MEDICINE

## 2018-06-10 PROCEDURE — 6370000000 HC RX 637 (ALT 250 FOR IP): Performed by: INTERNAL MEDICINE

## 2018-06-10 PROCEDURE — 36415 COLL VENOUS BLD VENIPUNCTURE: CPT

## 2018-06-10 PROCEDURE — 6360000002 HC RX W HCPCS: Performed by: SURGERY

## 2018-06-10 PROCEDURE — 2580000003 HC RX 258: Performed by: SURGERY

## 2018-06-10 PROCEDURE — 99239 HOSP IP/OBS DSCHRG MGMT >30: CPT | Performed by: INTERNAL MEDICINE

## 2018-06-10 RX ORDER — HYDROCODONE BITARTRATE AND ACETAMINOPHEN 5; 325 MG/1; MG/1
1 TABLET ORAL EVERY 4 HOURS PRN
Qty: 40 TABLET | Refills: 0 | Status: SHIPPED | OUTPATIENT
Start: 2018-06-10 | End: 2018-06-17

## 2018-06-10 RX ORDER — CHLORTHALIDONE 25 MG/1
12.5 TABLET ORAL DAILY
Qty: 30 TABLET | Refills: 3 | Status: ON HOLD
Start: 2018-06-10 | End: 2018-07-16 | Stop reason: ALTCHOICE

## 2018-06-10 RX ADMIN — ACETAMINOPHEN 650 MG: 325 TABLET ORAL at 08:53

## 2018-06-10 RX ADMIN — EPLERENONE 50 MG: 25 TABLET, FILM COATED ORAL at 08:11

## 2018-06-10 RX ADMIN — ACETAMINOPHEN 650 MG: 325 TABLET ORAL at 02:50

## 2018-06-10 RX ADMIN — METOCLOPRAMIDE 10 MG: 5 INJECTION, SOLUTION INTRAMUSCULAR; INTRAVENOUS at 12:40

## 2018-06-10 RX ADMIN — Medication 10 ML: at 08:16

## 2018-06-10 RX ADMIN — CEFOXITIN SODIUM 1 G: 1 POWDER, FOR SOLUTION INTRAVENOUS at 02:50

## 2018-06-10 RX ADMIN — METOPROLOL SUCCINATE 100 MG: 100 TABLET, FILM COATED, EXTENDED RELEASE ORAL at 08:11

## 2018-06-10 RX ADMIN — PAROXETINE HYDROCHLORIDE 20 MG: 20 TABLET, FILM COATED ORAL at 08:11

## 2018-06-10 RX ADMIN — LISINOPRIL 20 MG: 20 TABLET ORAL at 08:11

## 2018-06-10 RX ADMIN — CEFOXITIN SODIUM 1 G: 1 POWDER, FOR SOLUTION INTRAVENOUS at 08:11

## 2018-06-10 RX ADMIN — HYDRALAZINE HYDROCHLORIDE 25 MG: 25 TABLET, FILM COATED ORAL at 03:28

## 2018-06-10 RX ADMIN — FAMOTIDINE 20 MG: 20 TABLET ORAL at 08:11

## 2018-06-10 ASSESSMENT — PAIN SCALES - GENERAL
PAINLEVEL_OUTOF10: 0
PAINLEVEL_OUTOF10: 2
PAINLEVEL_OUTOF10: 3
PAINLEVEL_OUTOF10: 0

## 2018-06-10 ASSESSMENT — PAIN DESCRIPTION - PAIN TYPE
TYPE: SURGICAL PAIN
TYPE: SURGICAL PAIN

## 2018-06-10 ASSESSMENT — PAIN DESCRIPTION - LOCATION
LOCATION: ABDOMEN
LOCATION: ABDOMEN

## 2018-06-10 ASSESSMENT — PAIN DESCRIPTION - DESCRIPTORS: DESCRIPTORS: ACHING

## 2018-06-12 ENCOUNTER — OFFICE VISIT (OUTPATIENT)
Dept: NEPHROLOGY | Age: 63
End: 2018-06-12
Payer: COMMERCIAL

## 2018-06-12 VITALS — BODY MASS INDEX: 31.14 KG/M2 | SYSTOLIC BLOOD PRESSURE: 120 MMHG | DIASTOLIC BLOOD PRESSURE: 80 MMHG | WEIGHT: 217 LBS

## 2018-06-12 DIAGNOSIS — E26.9 HYPERALDOSTERONISM (HCC): ICD-10-CM

## 2018-06-12 DIAGNOSIS — Z90.49 S/P LAPAROSCOPIC CHOLECYSTECTOMY: ICD-10-CM

## 2018-06-12 DIAGNOSIS — I15.2 HYPERTENSION DUE TO ENDOCRINE DISORDER: ICD-10-CM

## 2018-06-12 DIAGNOSIS — N18.2 CKD (CHRONIC KIDNEY DISEASE), STAGE II: Primary | ICD-10-CM

## 2018-06-12 DIAGNOSIS — F41.9 ANXIETY: ICD-10-CM

## 2018-06-12 PROCEDURE — 99214 OFFICE O/P EST MOD 30 MIN: CPT | Performed by: INTERNAL MEDICINE

## 2018-06-22 ENCOUNTER — OFFICE VISIT (OUTPATIENT)
Dept: SURGERY | Age: 63
End: 2018-06-22

## 2018-06-22 VITALS
HEIGHT: 71 IN | WEIGHT: 225.7 LBS | HEART RATE: 86 BPM | TEMPERATURE: 97.7 F | OXYGEN SATURATION: 97 % | DIASTOLIC BLOOD PRESSURE: 80 MMHG | SYSTOLIC BLOOD PRESSURE: 138 MMHG | BODY MASS INDEX: 31.6 KG/M2 | RESPIRATION RATE: 18 BRPM

## 2018-06-22 DIAGNOSIS — Z90.49 S/P LAPAROSCOPIC CHOLECYSTECTOMY: Primary | ICD-10-CM

## 2018-06-22 PROCEDURE — 99024 POSTOP FOLLOW-UP VISIT: CPT | Performed by: SURGERY

## 2018-07-15 ENCOUNTER — HOSPITAL ENCOUNTER (INPATIENT)
Age: 63
LOS: 4 days | Discharge: HOME | DRG: 438 | End: 2018-07-20
Attending: FAMILY MEDICINE | Admitting: FAMILY MEDICINE
Payer: COMMERCIAL

## 2018-07-15 DIAGNOSIS — R74.8 ELEVATED LIVER ENZYMES: ICD-10-CM

## 2018-07-15 DIAGNOSIS — K85.00 IDIOPATHIC ACUTE PANCREATITIS, UNSPECIFIED COMPLICATION STATUS: Primary | ICD-10-CM

## 2018-07-15 DIAGNOSIS — K85.00 IDIOPATHIC ACUTE PANCREATITIS WITHOUT INFECTION OR NECROSIS: ICD-10-CM

## 2018-07-15 DIAGNOSIS — R79.89 ELEVATED LFTS: ICD-10-CM

## 2018-07-15 LAB
EKG ATRIAL RATE: 67 BPM
EKG P AXIS: 24 DEGREES
EKG P-R INTERVAL: 156 MS
EKG Q-T INTERVAL: 452 MS
EKG QRS DURATION: 96 MS
EKG QTC CALCULATION (BAZETT): 477 MS
EKG R AXIS: -33 DEGREES
EKG T AXIS: 12 DEGREES
EKG VENTRICULAR RATE: 67 BPM

## 2018-07-15 PROCEDURE — 99285 EMERGENCY DEPT VISIT HI MDM: CPT

## 2018-07-15 PROCEDURE — 96374 THER/PROPH/DIAG INJ IV PUSH: CPT

## 2018-07-15 PROCEDURE — 93005 ELECTROCARDIOGRAM TRACING: CPT | Performed by: FAMILY MEDICINE

## 2018-07-15 PROCEDURE — 6360000002 HC RX W HCPCS

## 2018-07-15 RX ORDER — ONDANSETRON 2 MG/ML
INJECTION INTRAMUSCULAR; INTRAVENOUS
Status: COMPLETED
Start: 2018-07-15 | End: 2018-07-15

## 2018-07-15 RX ORDER — METOCLOPRAMIDE HYDROCHLORIDE 5 MG/ML
10 INJECTION INTRAMUSCULAR; INTRAVENOUS ONCE
Status: COMPLETED | OUTPATIENT
Start: 2018-07-16 | End: 2018-07-16

## 2018-07-15 RX ORDER — 0.9 % SODIUM CHLORIDE 0.9 %
1000 INTRAVENOUS SOLUTION INTRAVENOUS ONCE
Status: COMPLETED | OUTPATIENT
Start: 2018-07-16 | End: 2018-07-16

## 2018-07-15 RX ORDER — MORPHINE SULFATE 4 MG/ML
4 INJECTION, SOLUTION INTRAMUSCULAR; INTRAVENOUS
Status: COMPLETED | OUTPATIENT
Start: 2018-07-15 | End: 2018-07-16

## 2018-07-15 RX ORDER — ONDANSETRON 2 MG/ML
4 INJECTION INTRAMUSCULAR; INTRAVENOUS ONCE
Status: COMPLETED | OUTPATIENT
Start: 2018-07-16 | End: 2018-07-15

## 2018-07-15 RX ADMIN — ONDANSETRON 4 MG: 2 INJECTION INTRAMUSCULAR; INTRAVENOUS at 23:39

## 2018-07-15 ASSESSMENT — PAIN DESCRIPTION - ONSET: ONSET: ON-GOING

## 2018-07-15 ASSESSMENT — PAIN SCALES - GENERAL: PAINLEVEL_OUTOF10: 10

## 2018-07-15 ASSESSMENT — PAIN DESCRIPTION - FREQUENCY: FREQUENCY: CONTINUOUS

## 2018-07-15 ASSESSMENT — PAIN DESCRIPTION - DESCRIPTORS: DESCRIPTORS: SHARP

## 2018-07-15 ASSESSMENT — PAIN DESCRIPTION - LOCATION: LOCATION: ABDOMEN

## 2018-07-15 ASSESSMENT — PAIN DESCRIPTION - ORIENTATION: ORIENTATION: UPPER

## 2018-07-15 ASSESSMENT — PAIN DESCRIPTION - PAIN TYPE: TYPE: ACUTE PAIN

## 2018-07-16 ENCOUNTER — APPOINTMENT (OUTPATIENT)
Dept: CT IMAGING | Age: 63
DRG: 438 | End: 2018-07-16
Payer: COMMERCIAL

## 2018-07-16 ENCOUNTER — APPOINTMENT (OUTPATIENT)
Dept: GENERAL RADIOLOGY | Age: 63
DRG: 438 | End: 2018-07-16
Payer: COMMERCIAL

## 2018-07-16 ENCOUNTER — APPOINTMENT (OUTPATIENT)
Dept: MRI IMAGING | Age: 63
DRG: 438 | End: 2018-07-16
Payer: COMMERCIAL

## 2018-07-16 PROBLEM — I10 ESSENTIAL HYPERTENSION: Status: ACTIVE | Noted: 2018-07-16

## 2018-07-16 PROBLEM — E87.20 LACTIC ACIDOSIS: Status: ACTIVE | Noted: 2018-07-16

## 2018-07-16 PROBLEM — D72.829 LEUKOCYTOSIS: Status: ACTIVE | Noted: 2018-07-16

## 2018-07-16 PROBLEM — I16.0 HYPERTENSIVE URGENCY: Status: ACTIVE | Noted: 2018-07-16

## 2018-07-16 PROBLEM — N17.9 AKI (ACUTE KIDNEY INJURY) (HCC): Status: ACTIVE | Noted: 2018-07-16

## 2018-07-16 PROBLEM — K75.9 HEPATITIS: Status: ACTIVE | Noted: 2018-07-16

## 2018-07-16 PROBLEM — K85.90 ACUTE PANCREATITIS WITHOUT NECROSIS OR INFECTION, UNSPECIFIED: Status: ACTIVE | Noted: 2018-07-16

## 2018-07-16 LAB
ALBUMIN SERPL-MCNC: 4.3 G/DL (ref 3.5–5.1)
ALP BLD-CCNC: 157 U/L (ref 38–126)
ALT SERPL-CCNC: 465 U/L (ref 11–66)
AMMONIA: 38 UMOL/L (ref 11–60)
AMYLASE: 2166 U/L (ref 20–104)
ANION GAP SERPL CALCULATED.3IONS-SCNC: 22 MEQ/L (ref 8–16)
AST SERPL-CCNC: 274 U/L (ref 5–40)
BACTERIA: ABNORMAL /HPF
BASOPHILS # BLD: 0.4 %
BASOPHILS ABSOLUTE: 0 THOU/MM3 (ref 0–0.1)
BILIRUB SERPL-MCNC: 7.1 MG/DL (ref 0.3–1.2)
BILIRUBIN URINE: ABNORMAL
BILIRUBIN URINE: NEGATIVE
BLOOD, URINE: NEGATIVE
BLOOD, URINE: NEGATIVE
BUN BLDV-MCNC: 14 MG/DL (ref 7–22)
CALCIUM SERPL-MCNC: 10.9 MG/DL (ref 8.5–10.5)
CASTS 2: ABNORMAL /LPF
CASTS UA: ABNORMAL /LPF
CHARACTER, URINE: CLEAR
CHARACTER, URINE: CLEAR
CHLORIDE BLD-SCNC: 98 MEQ/L (ref 98–111)
CHOLESTEROL, TOTAL: 204 MG/DL (ref 100–199)
CO2: 25 MEQ/L (ref 23–33)
COLOR: YELLOW
COLOR: YELLOW
CREAT SERPL-MCNC: 1.6 MG/DL (ref 0.4–1.2)
CRYSTALS, UA: ABNORMAL
EOSINOPHIL # BLD: 0.1 %
EOSINOPHILS ABSOLUTE: 0 THOU/MM3 (ref 0–0.4)
EPITHELIAL CELLS, UA: ABNORMAL /HPF
ERYTHROCYTE [DISTWIDTH] IN BLOOD BY AUTOMATED COUNT: 14.4 % (ref 11.5–14.5)
ERYTHROCYTE [DISTWIDTH] IN BLOOD BY AUTOMATED COUNT: 45.3 FL (ref 35–45)
GFR SERPL CREATININE-BSD FRML MDRD: 44 ML/MIN/1.73M2
GLUCOSE BLD-MCNC: 184 MG/DL (ref 70–108)
GLUCOSE URINE: NEGATIVE MG/DL
GLUCOSE URINE: NEGATIVE MG/DL
HAV IGM SER IA-ACNC: NEGATIVE
HCT VFR BLD CALC: 48.7 % (ref 42–52)
HDLC SERPL-MCNC: 41 MG/DL
HEMOGLOBIN: 16.7 GM/DL (ref 14–18)
HEPATITIS B CORE IGM ANTIBODY: NEGATIVE
HEPATITIS B SURFACE ANTIGEN: NEGATIVE
HEPATITIS C ANTIBODY: NEGATIVE
ICTOTEST: POSITIVE
IMMATURE GRANS (ABS): 0.05 THOU/MM3 (ref 0–0.07)
IMMATURE GRANULOCYTES: 0.4 %
INR BLD: 1 (ref 0.85–1.13)
KETONES, URINE: NEGATIVE
KETONES, URINE: NEGATIVE
LACTIC ACID: 2.9 MMOL/L (ref 0.5–2.2)
LACTIC ACID: 3.7 MMOL/L (ref 0.5–2.2)
LACTIC ACID: 4.1 MMOL/L (ref 0.5–2.2)
LACTIC ACID: 5.4 MMOL/L (ref 0.5–2.2)
LDL CHOLESTEROL CALCULATED: 141 MG/DL
LEUKOCYTE ESTERASE, URINE: NEGATIVE
LEUKOCYTE ESTERASE, URINE: NEGATIVE
LIPASE: > 600 U/L (ref 5.6–51.3)
LYMPHOCYTES # BLD: 12.8 %
LYMPHOCYTES ABSOLUTE: 1.5 THOU/MM3 (ref 1–4.8)
MAGNESIUM: 2.1 MG/DL (ref 1.6–2.4)
MCH RBC QN AUTO: 29.6 PG (ref 26–33)
MCHC RBC AUTO-ENTMCNC: 34.3 GM/DL (ref 32.2–35.5)
MCV RBC AUTO: 86.2 FL (ref 80–94)
MISCELLANEOUS 2: ABNORMAL
MONOCYTES # BLD: 6.9 %
MONOCYTES ABSOLUTE: 0.8 THOU/MM3 (ref 0.4–1.3)
MRSA SCREEN RT-PCR: NEGATIVE
NITRITE, URINE: NEGATIVE
NITRITE, URINE: NEGATIVE
NUCLEATED RED BLOOD CELLS: 0 /100 WBC
OSMOLALITY CALCULATION: 293.9 MOSMOL/KG (ref 275–300)
PH UA: 7
PH UA: 7
PLATELET # BLD: 278 THOU/MM3 (ref 130–400)
PMV BLD AUTO: 10.8 FL (ref 9.4–12.4)
POTASSIUM REFLEX MAGNESIUM: 3.4 MEQ/L (ref 3.5–5.2)
PRO-BNP: 342.1 PG/ML (ref 0–900)
PROCALCITONIN: 0.79 NG/ML (ref 0.01–0.09)
PROTEIN UA: 30
PROTEIN UA: NEGATIVE
RBC # BLD: 5.65 MILL/MM3 (ref 4.7–6.1)
RBC URINE: ABNORMAL /HPF
RENAL EPITHELIAL, UA: ABNORMAL
RHEUMATOID FACTOR: < 10 IU/ML (ref 0–13)
SEG NEUTROPHILS: 79.4 %
SEGMENTED NEUTROPHILS ABSOLUTE COUNT: 9.4 THOU/MM3 (ref 1.8–7.7)
SODIUM BLD-SCNC: 145 MEQ/L (ref 135–145)
SPECIFIC GRAVITY, URINE: 1.01 (ref 1–1.03)
SPECIFIC GRAVITY, URINE: 1.02 (ref 1–1.03)
TOTAL PROTEIN: 9.1 G/DL (ref 6.1–8)
TRIGL SERPL-MCNC: 108 MG/DL (ref 0–199)
TROPONIN T: < 0.01 NG/ML
TROPONIN T: < 0.01 NG/ML
UROBILINOGEN, URINE: 0.2 EU/DL
UROBILINOGEN, URINE: 1 EU/DL
WBC # BLD: 11.9 THOU/MM3 (ref 4.8–10.8)
WBC UA: ABNORMAL /HPF
YEAST: ABNORMAL

## 2018-07-16 PROCEDURE — 6360000002 HC RX W HCPCS: Performed by: FAMILY MEDICINE

## 2018-07-16 PROCEDURE — 2580000003 HC RX 258: Performed by: FAMILY MEDICINE

## 2018-07-16 PROCEDURE — 6360000004 HC RX CONTRAST MEDICATION: Performed by: INTERNAL MEDICINE

## 2018-07-16 PROCEDURE — 81003 URINALYSIS AUTO W/O SCOPE: CPT

## 2018-07-16 PROCEDURE — 36415 COLL VENOUS BLD VENIPUNCTURE: CPT

## 2018-07-16 PROCEDURE — 80061 LIPID PANEL: CPT

## 2018-07-16 PROCEDURE — 82150 ASSAY OF AMYLASE: CPT

## 2018-07-16 PROCEDURE — 81001 URINALYSIS AUTO W/SCOPE: CPT

## 2018-07-16 PROCEDURE — 87641 MR-STAPH DNA AMP PROBE: CPT

## 2018-07-16 PROCEDURE — 74183 MRI ABD W/O CNTR FLWD CNTR: CPT

## 2018-07-16 PROCEDURE — 87081 CULTURE SCREEN ONLY: CPT

## 2018-07-16 PROCEDURE — 87040 BLOOD CULTURE FOR BACTERIA: CPT

## 2018-07-16 PROCEDURE — 2500000003 HC RX 250 WO HCPCS: Performed by: FAMILY MEDICINE

## 2018-07-16 PROCEDURE — 84145 PROCALCITONIN (PCT): CPT

## 2018-07-16 PROCEDURE — 85025 COMPLETE CBC W/AUTO DIFF WBC: CPT

## 2018-07-16 PROCEDURE — 2709999900 HC NON-CHARGEABLE SUPPLY

## 2018-07-16 PROCEDURE — 74177 CT ABD & PELVIS W/CONTRAST: CPT

## 2018-07-16 PROCEDURE — 83690 ASSAY OF LIPASE: CPT

## 2018-07-16 PROCEDURE — 2500000003 HC RX 250 WO HCPCS

## 2018-07-16 PROCEDURE — 84484 ASSAY OF TROPONIN QUANT: CPT

## 2018-07-16 PROCEDURE — 83735 ASSAY OF MAGNESIUM: CPT

## 2018-07-16 PROCEDURE — 96375 TX/PRO/DX INJ NEW DRUG ADDON: CPT

## 2018-07-16 PROCEDURE — 93010 ELECTROCARDIOGRAM REPORT: CPT | Performed by: INTERNAL MEDICINE

## 2018-07-16 PROCEDURE — 83835 ASSAY OF METANEPHRINES: CPT

## 2018-07-16 PROCEDURE — 2580000003 HC RX 258: Performed by: INTERNAL MEDICINE

## 2018-07-16 PROCEDURE — 6370000000 HC RX 637 (ALT 250 FOR IP): Performed by: INTERNAL MEDICINE

## 2018-07-16 PROCEDURE — 2500000003 HC RX 250 WO HCPCS: Performed by: INTERNAL MEDICINE

## 2018-07-16 PROCEDURE — 80074 ACUTE HEPATITIS PANEL: CPT

## 2018-07-16 PROCEDURE — 6360000002 HC RX W HCPCS: Performed by: INTERNAL MEDICINE

## 2018-07-16 PROCEDURE — 83880 ASSAY OF NATRIURETIC PEPTIDE: CPT

## 2018-07-16 PROCEDURE — 80053 COMPREHEN METABOLIC PANEL: CPT

## 2018-07-16 PROCEDURE — S0028 INJECTION, FAMOTIDINE, 20 MG: HCPCS | Performed by: FAMILY MEDICINE

## 2018-07-16 PROCEDURE — 83605 ASSAY OF LACTIC ACID: CPT

## 2018-07-16 PROCEDURE — 86038 ANTINUCLEAR ANTIBODIES: CPT

## 2018-07-16 PROCEDURE — 99223 1ST HOSP IP/OBS HIGH 75: CPT | Performed by: FAMILY MEDICINE

## 2018-07-16 PROCEDURE — A9579 GAD-BASE MR CONTRAST NOS,1ML: HCPCS | Performed by: INTERNAL MEDICINE

## 2018-07-16 PROCEDURE — 86430 RHEUMATOID FACTOR TEST QUAL: CPT

## 2018-07-16 PROCEDURE — 85610 PROTHROMBIN TIME: CPT

## 2018-07-16 PROCEDURE — 96376 TX/PRO/DX INJ SAME DRUG ADON: CPT

## 2018-07-16 PROCEDURE — 71046 X-RAY EXAM CHEST 2 VIEWS: CPT

## 2018-07-16 PROCEDURE — 6360000004 HC RX CONTRAST MEDICATION: Performed by: FAMILY MEDICINE

## 2018-07-16 PROCEDURE — 2060000000 HC ICU INTERMEDIATE R&B

## 2018-07-16 PROCEDURE — 82140 ASSAY OF AMMONIA: CPT

## 2018-07-16 RX ORDER — LISINOPRIL 20 MG/1
20 TABLET ORAL DAILY
Status: DISCONTINUED | OUTPATIENT
Start: 2018-07-16 | End: 2018-07-17

## 2018-07-16 RX ORDER — FENTANYL CITRATE 50 UG/ML
75 INJECTION, SOLUTION INTRAMUSCULAR; INTRAVENOUS
Status: DISCONTINUED | OUTPATIENT
Start: 2018-07-16 | End: 2018-07-16

## 2018-07-16 RX ORDER — PAROXETINE HYDROCHLORIDE 20 MG/1
20 TABLET, FILM COATED ORAL DAILY
Status: DISCONTINUED | OUTPATIENT
Start: 2018-07-16 | End: 2018-07-20 | Stop reason: HOSPADM

## 2018-07-16 RX ORDER — FENTANYL CITRATE 50 UG/ML
75 INJECTION, SOLUTION INTRAMUSCULAR; INTRAVENOUS
Status: DISCONTINUED | OUTPATIENT
Start: 2018-07-16 | End: 2018-07-17

## 2018-07-16 RX ORDER — METOPROLOL SUCCINATE 100 MG/1
100 TABLET, EXTENDED RELEASE ORAL DAILY
Status: DISCONTINUED | OUTPATIENT
Start: 2018-07-16 | End: 2018-07-19

## 2018-07-16 RX ORDER — MORPHINE SULFATE 2 MG/ML
2 INJECTION, SOLUTION INTRAMUSCULAR; INTRAVENOUS EVERY 4 HOURS PRN
Status: DISCONTINUED | OUTPATIENT
Start: 2018-07-16 | End: 2018-07-16

## 2018-07-16 RX ORDER — FENTANYL CITRATE 50 UG/ML
50 INJECTION, SOLUTION INTRAMUSCULAR; INTRAVENOUS ONCE
Status: COMPLETED | OUTPATIENT
Start: 2018-07-16 | End: 2018-07-16

## 2018-07-16 RX ORDER — MORPHINE SULFATE 2 MG/ML
1 INJECTION, SOLUTION INTRAMUSCULAR; INTRAVENOUS EVERY 4 HOURS PRN
Status: DISCONTINUED | OUTPATIENT
Start: 2018-07-16 | End: 2018-07-16

## 2018-07-16 RX ORDER — POTASSIUM CHLORIDE 7.45 MG/ML
10 INJECTION INTRAVENOUS
Status: COMPLETED | OUTPATIENT
Start: 2018-07-16 | End: 2018-07-16

## 2018-07-16 RX ORDER — FENTANYL CITRATE 50 UG/ML
100 INJECTION, SOLUTION INTRAMUSCULAR; INTRAVENOUS
Status: COMPLETED | OUTPATIENT
Start: 2018-07-16 | End: 2018-07-16

## 2018-07-16 RX ORDER — SODIUM CHLORIDE 9 MG/ML
INJECTION, SOLUTION INTRAVENOUS CONTINUOUS
Status: DISCONTINUED | OUTPATIENT
Start: 2018-07-16 | End: 2018-07-16

## 2018-07-16 RX ORDER — HYDRALAZINE HYDROCHLORIDE 20 MG/ML
5 INJECTION INTRAMUSCULAR; INTRAVENOUS EVERY 6 HOURS PRN
Status: DISCONTINUED | OUTPATIENT
Start: 2018-07-16 | End: 2018-07-20 | Stop reason: HOSPADM

## 2018-07-16 RX ORDER — SODIUM CHLORIDE 0.9 % (FLUSH) 0.9 %
10 SYRINGE (ML) INJECTION PRN
Status: DISCONTINUED | OUTPATIENT
Start: 2018-07-16 | End: 2018-07-20 | Stop reason: HOSPADM

## 2018-07-16 RX ORDER — SODIUM CHLORIDE 9 MG/ML
INJECTION, SOLUTION INTRAVENOUS CONTINUOUS
Status: DISCONTINUED | OUTPATIENT
Start: 2018-07-16 | End: 2018-07-17

## 2018-07-16 RX ORDER — 0.9 % SODIUM CHLORIDE 0.9 %
1000 INTRAVENOUS SOLUTION INTRAVENOUS ONCE
Status: COMPLETED | OUTPATIENT
Start: 2018-07-16 | End: 2018-07-16

## 2018-07-16 RX ORDER — ONDANSETRON 2 MG/ML
4 INJECTION INTRAMUSCULAR; INTRAVENOUS EVERY 4 HOURS PRN
Status: DISCONTINUED | OUTPATIENT
Start: 2018-07-16 | End: 2018-07-20 | Stop reason: RX

## 2018-07-16 RX ORDER — METOPROLOL TARTRATE 5 MG/5ML
2.5 INJECTION INTRAVENOUS EVERY 6 HOURS PRN
Status: DISCONTINUED | OUTPATIENT
Start: 2018-07-16 | End: 2018-07-17

## 2018-07-16 RX ORDER — METOPROLOL TARTRATE 5 MG/5ML
INJECTION INTRAVENOUS
Status: COMPLETED
Start: 2018-07-16 | End: 2018-07-16

## 2018-07-16 RX ORDER — METOPROLOL TARTRATE 5 MG/5ML
2.5 INJECTION INTRAVENOUS ONCE
Status: COMPLETED | OUTPATIENT
Start: 2018-07-16 | End: 2018-07-16

## 2018-07-16 RX ORDER — SODIUM CHLORIDE 0.9 % (FLUSH) 0.9 %
10 SYRINGE (ML) INJECTION EVERY 12 HOURS SCHEDULED
Status: DISCONTINUED | OUTPATIENT
Start: 2018-07-16 | End: 2018-07-20 | Stop reason: HOSPADM

## 2018-07-16 RX ORDER — ONDANSETRON 2 MG/ML
4 INJECTION INTRAMUSCULAR; INTRAVENOUS EVERY 6 HOURS PRN
Status: DISCONTINUED | OUTPATIENT
Start: 2018-07-16 | End: 2018-07-16

## 2018-07-16 RX ADMIN — METOPROLOL SUCCINATE 100 MG: 100 TABLET, FILM COATED, EXTENDED RELEASE ORAL at 12:04

## 2018-07-16 RX ADMIN — MORPHINE SULFATE 4 MG: 4 INJECTION, SOLUTION INTRAMUSCULAR; INTRAVENOUS at 00:00

## 2018-07-16 RX ADMIN — FENTANYL CITRATE 100 MCG: 50 INJECTION, SOLUTION INTRAMUSCULAR; INTRAVENOUS at 03:05

## 2018-07-16 RX ADMIN — ONDANSETRON 4 MG: 2 INJECTION INTRAMUSCULAR; INTRAVENOUS at 05:12

## 2018-07-16 RX ADMIN — FENTANYL CITRATE 75 MCG: 50 INJECTION, SOLUTION INTRAMUSCULAR; INTRAVENOUS at 11:43

## 2018-07-16 RX ADMIN — POTASSIUM CHLORIDE 10 MEQ: 7.46 INJECTION, SOLUTION INTRAVENOUS at 15:13

## 2018-07-16 RX ADMIN — SODIUM CHLORIDE 1000 ML: 9 INJECTION, SOLUTION INTRAVENOUS at 05:12

## 2018-07-16 RX ADMIN — FAMOTIDINE 20 MG: 10 INJECTION, SOLUTION INTRAVENOUS at 09:50

## 2018-07-16 RX ADMIN — SODIUM CHLORIDE: 9 INJECTION, SOLUTION INTRAVENOUS at 15:57

## 2018-07-16 RX ADMIN — PIPERACILLIN SODIUM AND TAZOBACTAM SODIUM 3.38 G: 3; .375 INJECTION, POWDER, LYOPHILIZED, FOR SOLUTION INTRAVENOUS at 10:52

## 2018-07-16 RX ADMIN — METOCLOPRAMIDE 10 MG: 5 INJECTION, SOLUTION INTRAMUSCULAR; INTRAVENOUS at 00:00

## 2018-07-16 RX ADMIN — FENTANYL CITRATE 75 MCG: 50 INJECTION, SOLUTION INTRAMUSCULAR; INTRAVENOUS at 22:43

## 2018-07-16 RX ADMIN — Medication 10 ML: at 09:50

## 2018-07-16 RX ADMIN — MORPHINE SULFATE 4 MG: 4 INJECTION, SOLUTION INTRAMUSCULAR; INTRAVENOUS at 01:11

## 2018-07-16 RX ADMIN — FENTANYL CITRATE 100 MCG: 50 INJECTION, SOLUTION INTRAMUSCULAR; INTRAVENOUS at 01:51

## 2018-07-16 RX ADMIN — HYDRALAZINE HYDROCHLORIDE 5 MG: 20 INJECTION INTRAMUSCULAR; INTRAVENOUS at 17:03

## 2018-07-16 RX ADMIN — METOPROLOL TARTRATE 2.5 MG: 1 INJECTION, SOLUTION INTRAVENOUS at 02:50

## 2018-07-16 RX ADMIN — SODIUM CHLORIDE 1000 ML: 9 INJECTION, SOLUTION INTRAVENOUS at 00:00

## 2018-07-16 RX ADMIN — FENTANYL CITRATE 75 MCG: 50 INJECTION, SOLUTION INTRAMUSCULAR; INTRAVENOUS at 17:01

## 2018-07-16 RX ADMIN — Medication 10 ML: at 21:04

## 2018-07-16 RX ADMIN — SODIUM CHLORIDE 1000 ML: 9 INJECTION, SOLUTION INTRAVENOUS at 01:45

## 2018-07-16 RX ADMIN — FAMOTIDINE 20 MG: 10 INJECTION, SOLUTION INTRAVENOUS at 21:03

## 2018-07-16 RX ADMIN — DEXTROSE MONOHYDRATE 5 MG/HR: 50 INJECTION, SOLUTION INTRAVENOUS at 04:18

## 2018-07-16 RX ADMIN — FENTANYL CITRATE 75 MCG: 50 INJECTION, SOLUTION INTRAMUSCULAR; INTRAVENOUS at 20:28

## 2018-07-16 RX ADMIN — METOPROLOL TARTRATE 2.5 MG: 5 INJECTION INTRAVENOUS at 02:50

## 2018-07-16 RX ADMIN — FENTANYL CITRATE 75 MCG: 50 INJECTION, SOLUTION INTRAMUSCULAR; INTRAVENOUS at 13:59

## 2018-07-16 RX ADMIN — DEXTROSE MONOHYDRATE 5 MG/HR: 50 INJECTION, SOLUTION INTRAVENOUS at 21:03

## 2018-07-16 RX ADMIN — POTASSIUM CHLORIDE 10 MEQ: 7.46 INJECTION, SOLUTION INTRAVENOUS at 12:05

## 2018-07-16 RX ADMIN — PIPERACILLIN SODIUM AND TAZOBACTAM SODIUM 3.38 G: 3; .375 INJECTION, POWDER, LYOPHILIZED, FOR SOLUTION INTRAVENOUS at 18:38

## 2018-07-16 RX ADMIN — LISINOPRIL 20 MG: 20 TABLET ORAL at 12:04

## 2018-07-16 RX ADMIN — PAROXETINE HYDROCHLORIDE 20 MG: 20 TABLET, FILM COATED ORAL at 12:04

## 2018-07-16 RX ADMIN — SODIUM CHLORIDE: 9 INJECTION, SOLUTION INTRAVENOUS at 04:18

## 2018-07-16 RX ADMIN — IOPAMIDOL 80 ML: 755 INJECTION, SOLUTION INTRAVENOUS at 01:25

## 2018-07-16 RX ADMIN — FENTANYL CITRATE 50 MCG: 50 INJECTION, SOLUTION INTRAMUSCULAR; INTRAVENOUS at 05:17

## 2018-07-16 RX ADMIN — MORPHINE SULFATE 2 MG: 2 INJECTION, SOLUTION INTRAMUSCULAR; INTRAVENOUS at 04:18

## 2018-07-16 RX ADMIN — MORPHINE SULFATE 1 MG: 2 INJECTION, SOLUTION INTRAMUSCULAR; INTRAVENOUS at 08:19

## 2018-07-16 RX ADMIN — GADOTERIDOL 20 ML: 279.3 INJECTION, SOLUTION INTRAVENOUS at 13:35

## 2018-07-16 ASSESSMENT — PAIN DESCRIPTION - ORIENTATION
ORIENTATION: UPPER;MID
ORIENTATION: MID;UPPER
ORIENTATION: MID;UPPER

## 2018-07-16 ASSESSMENT — PAIN DESCRIPTION - PAIN TYPE
TYPE: ACUTE PAIN

## 2018-07-16 ASSESSMENT — PAIN SCALES - GENERAL
PAINLEVEL_OUTOF10: 7
PAINLEVEL_OUTOF10: 6
PAINLEVEL_OUTOF10: 5
PAINLEVEL_OUTOF10: 7
PAINLEVEL_OUTOF10: 6
PAINLEVEL_OUTOF10: 6
PAINLEVEL_OUTOF10: 2
PAINLEVEL_OUTOF10: 7
PAINLEVEL_OUTOF10: 1
PAINLEVEL_OUTOF10: 6
PAINLEVEL_OUTOF10: 2
PAINLEVEL_OUTOF10: 4
PAINLEVEL_OUTOF10: 3
PAINLEVEL_OUTOF10: 7
PAINLEVEL_OUTOF10: 7
PAINLEVEL_OUTOF10: 6
PAINLEVEL_OUTOF10: 3
PAINLEVEL_OUTOF10: 7
PAINLEVEL_OUTOF10: 3
PAINLEVEL_OUTOF10: 5
PAINLEVEL_OUTOF10: 7
PAINLEVEL_OUTOF10: 6
PAINLEVEL_OUTOF10: 3
PAINLEVEL_OUTOF10: 2

## 2018-07-16 ASSESSMENT — PAIN DESCRIPTION - DESCRIPTORS
DESCRIPTORS: ACHING;SHARP
DESCRIPTORS: SHARP;ACHING
DESCRIPTORS: ACHING
DESCRIPTORS: ACHING;SHARP
DESCRIPTORS: ACHING

## 2018-07-16 ASSESSMENT — ENCOUNTER SYMPTOMS
SHORTNESS OF BREATH: 0
EYES NEGATIVE: 1
ANAL BLEEDING: 0
BLOOD IN STOOL: 0
BACK PAIN: 0
VOICE CHANGE: 0
CHEST TIGHTNESS: 0
WHEEZING: 0
CONSTIPATION: 0
RESPIRATORY NEGATIVE: 1
TROUBLE SWALLOWING: 0
DIARRHEA: 0
NAUSEA: 1
VOMITING: 1
RECTAL PAIN: 0
ABDOMINAL PAIN: 1

## 2018-07-16 ASSESSMENT — PAIN DESCRIPTION - LOCATION
LOCATION: ABDOMEN

## 2018-07-16 ASSESSMENT — PAIN DESCRIPTION - FREQUENCY
FREQUENCY: CONTINUOUS

## 2018-07-16 ASSESSMENT — PAIN DESCRIPTION - ONSET
ONSET: ON-GOING

## 2018-07-16 ASSESSMENT — PAIN DESCRIPTION - PROGRESSION: CLINICAL_PROGRESSION: GRADUALLY WORSENING

## 2018-07-16 NOTE — H&P
History & Physical    Patient:  Megan Torres  YOB: 1955  Date of Service: 7/16/2018  MRN: [id number]   Acct:  [de-identified]   Primary Care Physician: Annamaria Fontenot MD    Chief Complaint: Abdominal pain    History of Present Illness:   History obtained from chart review and the patient. The patient is a 66 y.o. male with Hyperaldosteronism, HTN, GERD, s/p Cholecystectomy who presents to the emergency Department complaints of nausea vomiting and upper abdominal pain that started around 7 pm, 3 -4 hours after eating. Patient notes constant upper abominal pain without radiation. He states pain severity at presentation a 10/10 but is 3/10 after Fentanyl administration,. Patient denies known triggers, sick contacts, fever, chills, chest pain, shortness of breath or dysuria. Patient also complains of elevated blood pressure and  Diaphoresis. He notes one episode of associated non bilious, non bloody emesis. Patient relates that he had similar discomfort 1 month ago and had cholecystectomy following this. Patient relates that he had scheduled colonoscopy 1 month ago due to benign colonic polyps. He denies dark or tarry stools. CT abdomen pelvis showed Acute pancreatitis. No evidence of a pseudocyst, pancreatic necrosis or abscess. Colonic diverticulosis without diverticulitis. He has had episodes of uncontrolled BP in the past. Presenting  BP iwas 180/85 but BP went as high as 236/1133 and Cardene drip was started. Patient does have a history of HTN. He denies chest pain at this time. Last echo was in June, 2018 showing Normal left ventricle size and systolic function. Ejection fraction was estimated at 55%. There were no regional left ventricular wall motion abnormalities and wall thickness was within normal limits. Doppler parameters were consistent with abnormal left ventricular relaxation (grade 1 diastolic dysfunction). Patient denies smoking, alcohol or illicit drug use.  Patient is being vision, no double vision. Ears: no hearing difficulty, no tinnitus  Mouth/throat: no ulceration, dental caries, dysphagia  Lungs: no cough, no shortness of breath, no wheeze  CVS: no palpitation, no chest pain, no shortness of breath  GI: + abdominal pain, + nausea , + vomiting, no constipation  TORRES: no dysuria, frequency and urgency, no hematuria, no kidney stones  Musculoskeletal: no joint pain, swelling , stiffness  Endocrine: no polyuria, polydypsia, no cold or heat intolerence  Hematology: no anemia, no easy brusing or bleeding, no hx of clotting disorder  Dermatology: no skin rash, no eczema, no prurities,  Psychiatry: no depression, no anxiety,no panic attacks, no suicide ideation  Neurology: no syncope, no seizures, no numbness or tingling of hands, no numbness or tingling of feet, no paresis    10 point review of systems completed, all other than noted above are negative. Vitals:   Vitals:    07/16/18 0144   BP:    Pulse: 72   Resp: 24   Temp:    SpO2: 95%      BMI: Body mass index is 31.69 kg/m².                 Exam:  Physical Examination: General appearance -  Alert, ill appearing, and in some distress  Mental status -  alert, oriented to person, place, and time  Neck - supple, no significant adenopathy, no JVD, or carotid bruits  Chest -  clear to auscultation, no wheezes, rales or rhonchi, symmetric air entry  Heart -  normal rate, regular rhythm, normal S1, S2, no murmurs, rubs, clicks or gallops  Abdomen -  soft, + generalized abdominal tenderness, worse in upper abdomen,  nondistended, no masses or organomegaly  Neurological - alert, oriented, normal speech, no focal findings or movement disorder noted  Musculoskeletal -  no joint tenderness, deformity or swelling  Extremities -  peripheral pulses normal, no pedal edema, no clubbing or cyanosis  Skin -  normal coloration and turgor, no rashes, no suspicious skin lesions noted      Review of Labs and Diagnostic Testing:    Recent Results (from the past 24 hour(s))   EKG 12 Lead    Collection Time: 07/15/18 11:43 PM   Result Value Ref Range    Ventricular Rate 67 BPM    Atrial Rate 67 BPM    P-R Interval 156 ms    QRS Duration 96 ms    Q-T Interval 452 ms    QTc Calculation (Bazett) 477 ms    P Axis 24 degrees    R Axis -33 degrees    T Axis 12 degrees   CBC auto differential    Collection Time: 07/16/18 12:03 AM   Result Value Ref Range    WBC 11.9 (H) 4.8 - 10.8 thou/mm3    RBC 5.65 4.70 - 6.10 mill/mm3    Hemoglobin 16.7 14.0 - 18.0 gm/dl    Hematocrit 48.7 42.0 - 52.0 %    MCV 86.2 80.0 - 94.0 fL    MCH 29.6 26.0 - 33.0 pg    MCHC 34.3 32.2 - 35.5 gm/dl    RDW-CV 14.4 11.5 - 14.5 %    RDW-SD 45.3 (H) 35.0 - 45.0 fL    Platelets 919 332 - 848 thou/mm3    MPV 10.8 9.4 - 12.4 fL    Seg Neutrophils 79.4 %    Lymphocytes 12.8 %    Monocytes 6.9 %    Eosinophils 0.1 %    Basophils 0.4 %    Immature Granulocytes 0.4 %    Segs Absolute 9.4 (H) 1.8 - 7.7 thou/mm3    Lymphocytes # 1.5 1.0 - 4.8 thou/mm3    Monocytes # 0.8 0.4 - 1.3 thou/mm3    Eosinophils # 0.0 0.0 - 0.4 thou/mm3    Basophils # 0.0 0.0 - 0.1 thou/mm3    Immature Grans (Abs) 0.05 0.00 - 0.07 thou/mm3    nRBC 0 /100 wbc   Comprehensive Metabolic Panel w/ Reflex to MG    Collection Time: 07/16/18 12:03 AM   Result Value Ref Range    Glucose 184 (H) 70 - 108 mg/dL    CREATININE 1.6 (H) 0.4 - 1.2 mg/dL    BUN 14 7 - 22 mg/dL    Sodium 145 135 - 145 meq/L    Potassium reflex Magnesium 3.4 (L) 3.5 - 5.2 meq/L    Chloride 98 98 - 111 meq/L    CO2 25 23 - 33 meq/L    Calcium 10.9 (H) 8.5 - 10.5 mg/dL     (H) 5 - 40 U/L    Alkaline Phosphatase 157 (H) 38 - 126 U/L    Total Protein 9.1 (H) 6.1 - 8.0 g/dL    Alb 4.3 3.5 - 5.1 g/dL    Total Bilirubin 7.1 (H) 0.3 - 1.2 mg/dL     (H) 11 - 66 U/L   Troponin    Collection Time: 07/16/18 12:03 AM   Result Value Ref Range    Troponin T < 0.010 ng/ml   Amylase    Collection Time: 07/16/18 12:03 AM   Result Value Ref Range    Amylase 2,166 (H) 20 - 104 U/L   Lipase    Collection Time: 07/16/18 12:03 AM   Result Value Ref Range    Lipase >600.0 (H) 5.6 - 51.3 U/L   Lactic acid, plasma    Collection Time: 07/16/18 12:03 AM   Result Value Ref Range    Lactic Acid 4.1 (H) 0.5 - 2.2 mmol/L   Protime-INR    Collection Time: 07/16/18 12:03 AM   Result Value Ref Range    INR 1.00 0.85 - 1.13   Glomerular Filtration Rate, Estimated    Collection Time: 07/16/18 12:03 AM   Result Value Ref Range    Est, Glom Filt Rate 44 (A) ml/min/1.73m2   Magnesium    Collection Time: 07/16/18 12:03 AM   Result Value Ref Range    Magnesium 2.1 1.6 - 2.4 mg/dL   Osmolality    Collection Time: 07/16/18 12:03 AM   Result Value Ref Range    Osmolality Calc 293.9 275.0 - 300 mOsmol/kg   Anion Gap    Collection Time: 07/16/18 12:03 AM   Result Value Ref Range    Anion Gap 22.0 (H) 8.0 - 16.0 meq/L   Urine with Reflexed Micro    Collection Time: 07/16/18  1:45 AM   Result Value Ref Range    Glucose, Ur NEGATIVE NEGATIVE mg/dl    Bilirubin Urine SMALL (A) NEGATIVE    Ketones, Urine NEGATIVE NEGATIVE    Specific Gravity, Urine 1.015 1.002 - 1.03    Blood, Urine NEGATIVE NEGATIVE    pH, UA 7.0 5.0 - 9.0    Protein, UA 30 (A) NEGATIVE    Urobilinogen, Urine 1.0 0.0 - 1.0 eu/dl    Nitrite, Urine NEGATIVE NEGATIVE    Leukocyte Esterase, Urine NEGATIVE NEGATIVE    Color, UA YELLOW STRAW-YELL    Character, Urine CLEAR CLEAR-SL C   Bile Acids, Total    Collection Time: 07/16/18  1:45 AM   Result Value Ref Range    Ictotest POSITIVE (A) NEGATIVE       Radiology:     Xr Chest Standard (2 Vw)    Result Date: 7/16/2018  PROCEDURE: XR CHEST (2 VW) CLINICAL INFORMATION: upper abdominal pain, . COMPARISON: Chest x-ray dated 6/7/2018 TECHNIQUE: PA and lateral views the chest. FINDINGS: Lungs/pleura: No pneumonia, pulmonary edema, or obvious mass. There is very mild bibasilar atelectasis. No pleural effusion. No pneumothorax. Heart: Unremarkable. No cardiomegaly. Mediastinum/edita:  The aorta is tortuous, appropriate for age. Hilar and mediastinal silhouettes are otherwise unremarkable. Skeleton: Unremarkable. No significant bone or joint abnormality. Upper abdomen: There is no free air beneath the diaphragms. There are cholecystectomy clips in the right upper quadrant. No acute pneumonia or pulmonary edema. Very mild bibasilar atelectasis. **This report has been created using voice recognition software. It may contain minor errors which are inherent in voice recognition technology. ** Final report electronically signed by Dr. Dinh Finch on 7/16/2018 12:33 AM    Ct Abdomen Pelvis W Iv Contrast Additional Contrast? None    Result Date: 7/16/2018  PROCEDURE: CT ABDOMEN PELVIS W IV CONTRAST CLINICAL INFORMATION: upper abdominal pain . COMPARISON: Abdomen pelvis CT with contrast dated 6/7/2018 TECHNIQUE: 5 mm axial CT images were obtained through the abdomen and pelvis after the administration of intravenous and oral contrast. Coronal and sagittal reconstructions were obtained. All CT scans at this facility use dose modulation, iterative reconstruction, and/or weight-based dosing when appropriate to reduce radiation dose to as low as reasonably achievable. FINDINGS: Lower chest: There is mild bilateral lower lobe atelectasis. Liver: There is no change in the 1.3 x 0.9 cm hypodense lesion in the anterior subcapsular medial segment left lobe of the liver, too small to characterize. This is stable dating back to abdomen MRI dated 10/6/2015 and is probably a benign lesion such as  a cyst or hemangioma. There is fatty infiltration of the liver. There is very mild intrahepatic biliary dilatation, within normal limits postcholecystectomy. Gallbladder/Biliary tree: Patient is status post cholecystectomy with clips in the gallbladder fossa. There is no extrahepatic biliary dilatation. Spleen: Unremarkable. No splenomegaly. Pancreas:  There is acute pancreatitis with a moderate amount of peripancreatic fluid extending along ([facility] [address]- IV hydration, Hold lisinopril, Hold Diuretics, monitor BMP  4. Hypertensive urgency- Cardene drip, monitor   5. Lactic acidosis, poss d/t #1- IV hydration, recheck lactic acid  6. Leukocytosis, possible d/t #1- procal, blood cultures, UA, monitor CBC  7. Hepatitis, poss d/t to #1- acute hepatitis panel, INR, ammonia, GI consult        Dispo: Admit, IV hydration, Morphine prn, await pending labs, GI consult.  Hospitalist service will follow        DVT prophylaxis: [] Lovenox                                 [x] SCDs                                 [] SQ Heparin                                 [] Encourage ambulation, low risk for DVT, no chemical or mechanical prophylaxis necessary              [] Already on Anticoagulation                Anticipated Disposition upon discharge: [x] Home                                                                         [] Home with Home Health                                                                         [] Sachin Zuluaga                                                                         [] [address]          Electronically signed by Elba John DO on 7/16/2018 at 2:29 AM

## 2018-07-16 NOTE — ED NOTES
Patient resting in bed. Respirations easy and unlabored. No distress noted. Call light within reach.         Katelyn Humphreys RN  07/16/18 0156

## 2018-07-16 NOTE — ED NOTES
I spoke with hospitalist about the patient care.  Patient complains of no headaches or dizziness or lightheadedness at this time complains of no blurred vision at this time informed provider and states okay to send patient to Magaly Magallon RN  07/16/18 [id number]

## 2018-07-16 NOTE — ED NOTES
Patient resting in bed. Respirations easy and unlabored. No distress noted. Call light within reach.         Ilya Owen RN  07/16/18 5533

## 2018-07-16 NOTE — PROGRESS NOTES
Hospitalist Progress Note    Patient:  Denece Forward  YOB: 1955  MRN: [id number]   PCP: Ria Hansen MD         Acct: [de-identified]  Unit/Bed: [address] [place] [address]    Date of Admission: 7/15/2018      Chief Complaint     Pancreatitis    SUBJECTIVE     The patient is a 66 y.o.. male with a family history significant for rheumatoid arthritis, who presented to the hospital with symptoms of worsening mid-abdominal epigastric pain associated with nausea and vomiting. He is s/p cholecystectomy. His liver function tests are abnormal.    Nausea and vomiting resolved. 7/10 abdominal pain and patient slightly somnolent on interview but he awakens to verbal stimuli. Morphine does not seem to be controlling his pain very well. Blood pressure is better controlled. OBJECTIVE     Medications:  Reviewed    Ins and outs:      Intake/Output Summary (Last 24 hours) at 07/16/18 1054  Last data filed at 07/16/18 0622   Gross per 24 hour   Intake              536 ml   Output              700 ml   Net             -164 ml       Physical Examination     BP (!) 149/101   Pulse 103   Temp 97.6 °F (36.4 °C) (Oral)   Resp 26   Ht 5' 11\" (1.803 m)   Wt 218 lb 6.4 oz (99.1 kg)   SpO2 91%   BMI 30.46 kg/m²     General appearance: Patient sleep on entering room. Awakes to verbal stimuli  HEENT: Extraocular motion intact. Neck: Supple, with full range of motion. No jugular venous distention. Trachea midline. Respiratory:  Normal respiratory effort. Clear to auscultation, bilaterally without Rales/Wheezes/Rhonchi. Cardiovascular: S1 and S2 heard . Tachycardia. Abdomen: Soft, significantly tender to palpation in the epigastric region, non-distended, diminished bowel sounds. Musculoskeletal: passive and active ROM x 4 extremities. Neurologic: No focal sensory/motor deficits. Cranial nerves: II-XII intact, grossly non-focal.  Psychiatric: Alert to verbal stimuli. Thought content appropriate.    Skin:  No visible rashes or lesions. Labs     Recent Labs      07/16/18   0003   WBC  11.9*   HGB  16.7   HCT  48.7   PLT  278     Recent Labs      07/16/18   0003   NA  145   K  3.4*   CL  98   CO2  25   BUN  14   CREATININE  1.6*   CALCIUM  10.9*     Recent Labs      07/16/18   0003   AST  274*   ALT  465*   BILITOT  7.1*   ALKPHOS  157*     Recent Labs      07/16/18   0003   INR  1.00     Urinalysis:    Lab Results   Component Value Date    NITRU NEGATIVE 07/16/2018    WBCUA 0-2 07/16/2018    BACTERIA NONE 07/16/2018    RBCUA 0-2 07/16/2018    BLOODU NEGATIVE 07/16/2018    SPECGRAV 1.015 06/03/2016    SPECGRAV 1.017 10/20/2011    GLUCOSEU NEGATIVE 07/16/2018       Diagnostic imaging/procedures         Xr Chest Standard (2 Vw)    Result Date: 7/16/2018  PROCEDURE: XR CHEST (2 VW) CLINICAL INFORMATION: upper abdominal pain, . COMPARISON: Chest x-ray dated 6/7/2018 TECHNIQUE: PA and lateral views the chest. FINDINGS: Lungs/pleura: No pneumonia, pulmonary edema, or obvious mass. There is very mild bibasilar atelectasis. No pleural effusion. No pneumothorax. Heart: Unremarkable. No cardiomegaly. Mediastinum/edita: The aorta is tortuous, appropriate for age. Hilar and mediastinal silhouettes are otherwise unremarkable. Skeleton: Unremarkable. No significant bone or joint abnormality. Upper abdomen: There is no free air beneath the diaphragms. There are cholecystectomy clips in the right upper quadrant. No acute pneumonia or pulmonary edema. Very mild bibasilar atelectasis. **This report has been created using voice recognition software. It may contain minor errors which are inherent in voice recognition technology. ** Final report electronically signed by Dr. Janey Broussard on 7/16/2018 12:33 AM    Ct Abdomen Pelvis W Iv Contrast Additional Contrast? None    Result Date: 7/16/2018  PROCEDURE: CT ABDOMEN PELVIS W IV CONTRAST CLINICAL INFORMATION: upper abdominal pain .  COMPARISON: Abdomen pelvis CT with contrast dated

## 2018-07-16 NOTE — PROGRESS NOTES
Pt arrived in 4K 18 from ED. Complaints: abdominal pain/nausea    Oriented to [employer] and procedures. Call light in reach.

## 2018-07-16 NOTE — CONSULTS
Chief Complaint:  Pancreatitis    History of present Illness: Armida Gonzalez is a 66 y.o. male admitted to [facility] early this morning with acute pancreatitis. The patient reports during Muslim services yesterday, he had a one time episode of N/V. He states at between 6-8 pm, he began experiencing severe epigastric pain that radiated into his back. He states he has had pain like this before but has never been diagnosed with pancreatitis. He underwent lab work and CT scan of the abdomen and pelvis with findings consistent of acute pancreatitis. Because he was noted to have significantly elevated LFTs without known history of hepatitis, MRCP was performed to rule out biliary obstruction. He was also noted to have elevated WBC with lactic acidosis and elevated procalcitonin. He denies alcohol use prior to the onset of abdominal pain. Lipid panel noted no hypertriglyceridemia. MRCP showed no choledocholithiasis or biliary/pancreatic ductal dilation. Patient is noted to be taking lisinopril for HTN, which can cause pancreatitis. Patient denies fever or chills prior to admission; his wife reports he was \"sweaty, cold and clammy. \"  He denies jaundice but reports his urine was \"almost orange\" and his stools were \"slightly lighter\" in color. He denies constipation or diarrhea. He denies melena or hematochezia. He denies GERD, dysphagia, odynophagia, melena or hematochezia. Patient last had colonoscopy in April 2018 with Dr Steve Mccollum and was noted to have mild diverticulosis and multiple polypectomies. Biopsies confirmed tubular adenomatous and hyperplastic polyps. Past Medical History:  has a past medical history of Gallbladder disease; GERD (gastroesophageal reflux disease); HTN (hypertension); Hyperaldosteronism ([address] Hypertension; Hypokalemia; Metabolic alkalosis; and Prostate troubles.     Past Surgical History:   Past Surgical History:   Procedure Laterality Date    CHOLECYSTECTOMY      COLONOSCOPY bilaterally. There is no loculated or drainable fluid collection.   SPLEEN/ADRENAL GLANDS/KIDNEYS: Unremarkable.   BOWEL: Nonobstructive.   FREE AIR/FREE FLUID/INFLAMMATION:   1. As previously described.   LYMPHADENOPATHY:  1. There are no pathologically enlarged lymph nodes.    ABDOMINAL AORTA/IVC: Unremarkable.   LUNG BASES:   1. There are patchy atelectatic densities at both lung bases.    MUSCULOSKELETAL: Unremarkable.   OTHER: None.      Impression  1. Acute uncomplicated pancreatitis. 2. Cholecystectomy. 3. The common duct is normal size measuring 0.4 cm in transverse dimension.      **This report has been created using voice recognition software.  It may contain minor errors which are inherent in voice recognition technology. **     Final report electronically signed by Dr. Charline Cid on 7/16/2018 3:36 PM    PROCEDURE: CT ABDOMEN PELVIS W IV CONTRAST  7/16/2018   CLINICAL INFORMATION: upper abdominal pain .   COMPARISON: Abdomen pelvis CT with contrast dated 6/7/2018   TECHNIQUE: 5 mm axial CT images were obtained through the abdomen and pelvis after the administration of intravenous and oral contrast. Coronal and sagittal reconstructions were obtained.   All CT scans at this facility use dose modulation, iterative reconstruction, and/or weight-based dosing when appropriate to reduce radiation dose to as low as reasonably achievable.     FINDINGS:   Lower chest: There is mild bilateral lower lobe atelectasis.   Liver: There is no change in the 1.3 x 0.9 cm hypodense lesion in the anterior subcapsular medial segment left lobe of the liver, too small to characterize. This is stable dating back to abdomen MRI dated 10/6/2015 and is probably a benign lesion such as a cyst or hemangioma. There is fatty infiltration of the liver.  There is very mild intrahepatic biliary dilatation, within normal limits postcholecystectomy.   Gallbladder/Biliary tree: Patient is status post cholecystectomy with clips in the

## 2018-07-16 NOTE — ED NOTES
Bed: 008A  Expected date: 7/15/18  Expected time:   Means of arrival: [address] [facility]  Comments:      El Baumgarten, RN  07/15/18 032

## 2018-07-16 NOTE — ED NOTES
Patient transported to [facility]  in stable condition. Patient monitored on telemetry.            Nathan Hamilton LPN  74/08/24 8095

## 2018-07-16 NOTE — ED PROVIDER NOTES
Gastrointestinal: Positive for abdominal pain, nausea and vomiting. Negative for anal bleeding, blood in stool, constipation, diarrhea and rectal pain. Genitourinary: Negative for dysuria, hematuria and urgency. Musculoskeletal: Negative. Negative for back pain, neck pain and neck stiffness. Skin: Negative. Negative for rash. Neurological: Negative. Hematological: Negative. PAST MEDICAL HISTORY    has a past medical history of Gallbladder disease; GERD (gastroesophageal reflux disease); HTN (hypertension); Hyperaldosteronism ([address] Hypertension; Hypokalemia; Metabolic alkalosis; and Prostate troubles. SURGICAL HISTORY      has a past surgical history that includes skin biopsy (11/06/2012); Colonoscopy (5/); and pr lap,cholecystectomy (N/A, 6/9/2018). CURRENT MEDICATIONS       Previous Medications    ACETAMINOPHEN (TYLENOL) 325 MG TABLET    Take 650 mg by mouth every 6 hours as needed for Pain    CHLORTHALIDONE (HYGROTON) 25 MG TABLET    Take 0.5 tablets by mouth daily    EPLERENONE (INSPRA) 50 MG TABLET    Take 1 tablet by mouth 2 times daily    LISINOPRIL (PRINIVIL;ZESTRIL) 20 MG TABLET    TAKE ONE TABLET BY MOUTH TWICE DAILY    METOPROLOL SUCCINATE (TOPROL XL) 100 MG EXTENDED RELEASE TABLET    Take 1 tablet by mouth daily    PAROXETINE (PAXIL) 20 MG TABLET    Take 1 tablet by mouth every morning       ALLERGIES     is allergic to lipitor [atorvastatin] and aldactone [spironolactone]. FAMILY HISTORY     indicated that his mother is alive. He indicated that his father is alive. He indicated that his sister is alive. He indicated that his brother is alive. He indicated that the status of his paternal uncle is unknown. He indicated that the status of his neg hx is unknown.    family history includes Colon Polyps in his father and paternal uncle; Diabetes in his brother and father. SOCIAL HISTORY      reports that he has never smoked.  He has never used smokeless tobacco. He reports that he does not drink alcohol or use drugs. PHYSICAL EXAM     INITIAL VITALS:  weight is 224 lb (101.6 kg). His oral temperature is 97.6 °F (36.4 °C). His blood pressure is 159/103 (abnormal) and his pulse is 72. His respiration is 24 and oxygen saturation is 95%. Physical Exam   Constitutional: He is oriented to person, place, and time. He appears well-developed and well-nourished. No distress. HENT:   Head: Normocephalic and atraumatic. Right Ear: External ear normal.   Left Ear: External ear normal.   Nose: Nose normal.   Eyes: Conjunctivae and EOM are normal. Pupils are equal, round, and reactive to light. Right eye exhibits no discharge. Left eye exhibits no discharge. No scleral icterus. Neck: Normal range of motion. Neck supple. No JVD present. No tracheal deviation present. No thyromegaly present. Cardiovascular: Normal rate, regular rhythm, normal heart sounds and intact distal pulses. No murmur heard. Pulmonary/Chest: Effort normal and breath sounds normal. No respiratory distress. He has no wheezes. He has no rales. He exhibits no tenderness. Abdominal: Soft. Bowel sounds are normal. He exhibits no distension and no mass. There is tenderness in the epigastric area. There is no rigidity, no rebound, no guarding, no CVA tenderness and negative Walsh's sign. Musculoskeletal: Normal range of motion. He exhibits no edema or tenderness. Lymphadenopathy:     He has no cervical adenopathy. Neurological: He is alert and oriented to person, place, and time. No cranial nerve deficit. Coordination normal.   Skin: Skin is warm. No rash noted. He is not diaphoretic. No erythema. No pallor.              DIFFERENTIAL DIAGNOSIS:       DIAGNOSTIC RESULTS     EKG: All EKG's are interpreted by the Emergency Department Physician who either signs or Co-signs this chart in the absence of a cardiologist.      RADIOLOGY: non-plain film images(s) such as CT, Ultrasound and MRI are read by the radiologist.  Rory Butcher radiographic images are visualized and preliminarily interpreted by the emergency physician unless otherwise stated below. CT ABDOMEN PELVIS W IV CONTRAST Additional Contrast? None   Final Result   Acute pancreatitis. No evidence of a pseudocyst, pancreatic necrosis or abscess. Colonic diverticulosis without diverticulitis. Additional findings as detailed above. **This report has been created using voice recognition software. It may contain minor errors which are inherent in voice recognition technology. **      Final report electronically signed by Dr. Lin Shaffer on 7/16/2018 1:54 AM      XR CHEST STANDARD (2 VW)   Final Result      No acute pneumonia or pulmonary edema. Very mild bibasilar atelectasis. **This report has been created using voice recognition software. It may contain minor errors which are inherent in voice recognition technology. **      Final report electronically signed by Dr. Lin Shaffer on 7/16/2018 12:33 AM          LABS:   Labs Reviewed   CBC WITH AUTO DIFFERENTIAL - Abnormal; Notable for the following:        Result Value    WBC 11.9 (*)     RDW-SD 45.3 (*)     Segs Absolute 9.4 (*)     All other components within normal limits   COMPREHENSIVE METABOLIC PANEL W/ REFLEX TO MG FOR LOW K - Abnormal; Notable for the following:     Glucose 184 (*)     CREATININE 1.6 (*)     Potassium reflex Magnesium 3.4 (*)     Calcium 10.9 (*)      (*)     Alkaline Phosphatase 157 (*)     Total Protein 9.1 (*)     Total Bilirubin 7.1 (*)      (*)     All other components within normal limits   AMYLASE - Abnormal; Notable for the following:     Amylase 2,166 (*)     All other components within normal limits   LIPASE - Abnormal; Notable for the following:     Lipase >600.0 (*)     All other components within normal limits   LACTIC ACID, PLASMA - Abnormal; Notable for the following:     Lactic Acid 4.1 (*)     All other components within normal limits admitted. PATIENT REFERRED TO:  No follow-up provider specified.     DISCHARGE MEDICATIONS:  New Prescriptions    No medications on file       (Please note that portions of this note were completed with a voice recognition program.  Efforts were made to edit the dictations but occasionally words are mis-transcribed.)    MD Moriah Elizabeth MD  07/16/18 5538

## 2018-07-16 NOTE — CARE COORDINATION
7/16/18, 3:00 PM      Babar Haddad       Admitted from: ED    7/15/2018/ 2326 Hospital day: 0   Location: [facility]18/018-A Reason for admit: Acute pancreatitis without necrosis or infection, unspecified [K85.90] Status: Inpatient  Admit order signed?: yes  PMH:  has a past medical history of Gallbladder disease; GERD (gastroesophageal reflux disease); HTN (hypertension); Hyperaldosteronism ([address] Hypertension; Hypokalemia; Metabolic alkalosis; and Prostate troubles. Procedure:  na  Pertinent abnormal Imaging  :7/16/18  CXR  No acute pneumonia or pulmonary edema. Very mild bibasilar atelectasis. 7/16/18  CT abdomen/pelvis  Acute pancreatitis. No evidence of a pseudocyst, pancreatic necrosis or abscess. Colonic diverticulosis without diverticulitis. Additional findings as detailed above. Medications:  Scheduled Meds:   potassium replacement protocol   Other RX Placeholder    sodium chloride flush  10 mL Intravenous 2 times per day    famotidine (PEPCID) injection  20 mg Intravenous BID    piperacillin-tazobactam  3.375 g Intravenous Q8H    lisinopril  20 mg Oral Daily    metoprolol succinate  100 mg Oral Daily    PARoxetine  20 mg Oral Daily    potassium chloride  10 mEq Intravenous Q2H     Continuous Infusions:   sodium chloride 100 mL/hr at 07/16/18 0418    niCARdipine Stopped (07/16/18 1145)      Pertinent Info/Orders/Treatment Plan: IV antibiotics, telemetry, IV antibiotics, pain control IV Fentanyl/MS, monitor labs/vitals, remain NPO, K+ replacement protocol, lactic acid 5.4, follow WBC, GI consult, IV Cardene, MRI pending    Diet: Diet NPO Effective Now Exceptions are: Other (See Comment), Sips with Meds   DVT Prophylaxis: SCD's ordered  Smoking status:  reports that he has never smoked.  He has never used smokeless tobacco.   Influenza Vaccination Screening Completed: n/a  Pneumonia Vaccination Screening Completed: yes  PCP: Juan Garibay MD  Readmission: no  Readmission Risk Score: 11%    Discharge Planning  Current Residence:  Private Residence  Living Arrangements:  Spouse/Significant Other   Support Systems:  Spouse/Significant Other, Children, Family Members, Friends/Neighbors  Current Services PTA:     Potential Assistance Needed:  N/A  Potential Assistance Purchasing Medications:  No  Does patient want to participate in local refill/ meds to beds program?  No  Type of Home Care Services:  None  Patient expects to be discharged to:  home with wife  Expected Discharge date:  07/19/18  Follow Up Appointment: Best Day/ Time: Monday AM    Discharge Plan: Met with Renato Elias and his wife; patient lives home with her. Patient drives, uses no DME, has PCP, has no concerns affording medications when ready for discharge. Denies needs and declines HH at discharge. Monitor for needs.      Evaluation: no

## 2018-07-17 PROBLEM — B17.9 ACUTE HEPATITIS: Status: ACTIVE | Noted: 2018-07-16

## 2018-07-17 PROBLEM — R79.89 ELEVATED LFTS: Status: ACTIVE | Noted: 2018-07-17

## 2018-07-17 LAB
ALBUMIN SERPL-MCNC: 3.6 G/DL (ref 3.5–5.1)
ALP BLD-CCNC: 119 U/L (ref 38–126)
ALT SERPL-CCNC: 283 U/L (ref 11–66)
AMYLASE: 919 U/L (ref 20–104)
ANION GAP SERPL CALCULATED.3IONS-SCNC: 14 MEQ/L (ref 8–16)
AST SERPL-CCNC: 142 U/L (ref 5–40)
BASOPHILS # BLD: 0.2 %
BASOPHILS ABSOLUTE: 0 THOU/MM3 (ref 0–0.1)
BILIRUB SERPL-MCNC: 5.5 MG/DL (ref 0.3–1.2)
BILIRUBIN DIRECT: 2.8 MG/DL (ref 0–0.3)
BUN BLDV-MCNC: 22 MG/DL (ref 7–22)
CALCIUM SERPL-MCNC: 8.1 MG/DL (ref 8.5–10.5)
CHLORIDE BLD-SCNC: 104 MEQ/L (ref 98–111)
CO2: 25 MEQ/L (ref 23–33)
CREAT SERPL-MCNC: 1.4 MG/DL (ref 0.4–1.2)
EOSINOPHIL # BLD: 0 %
EOSINOPHILS ABSOLUTE: 0 THOU/MM3 (ref 0–0.4)
ERYTHROCYTE [DISTWIDTH] IN BLOOD BY AUTOMATED COUNT: 16.1 % (ref 11.5–14.5)
ERYTHROCYTE [DISTWIDTH] IN BLOOD BY AUTOMATED COUNT: 48.9 FL (ref 35–45)
GFR SERPL CREATININE-BSD FRML MDRD: 51 ML/MIN/1.73M2
GLUCOSE BLD-MCNC: 157 MG/DL (ref 70–108)
HCT VFR BLD CALC: 51.2 % (ref 42–52)
HEMOGLOBIN: 17.2 GM/DL (ref 14–18)
IMMATURE GRANS (ABS): 0.17 THOU/MM3 (ref 0–0.07)
IMMATURE GRANULOCYTES: 0.8 %
LACTIC ACID: 1.9 MMOL/L (ref 0.5–2.2)
LACTIC ACID: 2 MMOL/L (ref 0.5–2.2)
LACTIC ACID: 2.4 MMOL/L (ref 0.5–2.2)
LACTIC ACID: 2.9 MMOL/L (ref 0.5–2.2)
LIPASE: > 600 U/L (ref 5.6–51.3)
LYMPHOCYTES # BLD: 5.3 %
LYMPHOCYTES ABSOLUTE: 1.2 THOU/MM3 (ref 1–4.8)
MCH RBC QN AUTO: 29.3 PG (ref 26–33)
MCHC RBC AUTO-ENTMCNC: 33.6 GM/DL (ref 32.2–35.5)
MCV RBC AUTO: 87.2 FL (ref 80–94)
MONOCYTES # BLD: 6.7 %
MONOCYTES ABSOLUTE: 1.5 THOU/MM3 (ref 0.4–1.3)
NUCLEATED RED BLOOD CELLS: 0 /100 WBC
PLATELET # BLD: 222 THOU/MM3 (ref 130–400)
PMV BLD AUTO: 10.4 FL (ref 9.4–12.4)
POTASSIUM SERPL-SCNC: 4 MEQ/L (ref 3.5–5.2)
RBC # BLD: 5.87 MILL/MM3 (ref 4.7–6.1)
SEG NEUTROPHILS: 87 %
SEGMENTED NEUTROPHILS ABSOLUTE COUNT: 19 THOU/MM3 (ref 1.8–7.7)
SODIUM BLD-SCNC: 143 MEQ/L (ref 135–145)
TOTAL PROTEIN: 6.9 G/DL (ref 6.1–8)
WBC # BLD: 21.8 THOU/MM3 (ref 4.8–10.8)

## 2018-07-17 PROCEDURE — 2060000000 HC ICU INTERMEDIATE R&B

## 2018-07-17 PROCEDURE — 83690 ASSAY OF LIPASE: CPT

## 2018-07-17 PROCEDURE — 2580000003 HC RX 258: Performed by: FAMILY MEDICINE

## 2018-07-17 PROCEDURE — 83605 ASSAY OF LACTIC ACID: CPT

## 2018-07-17 PROCEDURE — 99233 SBSQ HOSP IP/OBS HIGH 50: CPT | Performed by: INTERNAL MEDICINE

## 2018-07-17 PROCEDURE — 85025 COMPLETE CBC W/AUTO DIFF WBC: CPT

## 2018-07-17 PROCEDURE — 6370000000 HC RX 637 (ALT 250 FOR IP): Performed by: INTERNAL MEDICINE

## 2018-07-17 PROCEDURE — 2580000003 HC RX 258: Performed by: INTERNAL MEDICINE

## 2018-07-17 PROCEDURE — 2500000003 HC RX 250 WO HCPCS: Performed by: INTERNAL MEDICINE

## 2018-07-17 PROCEDURE — 82150 ASSAY OF AMYLASE: CPT

## 2018-07-17 PROCEDURE — 82248 BILIRUBIN DIRECT: CPT

## 2018-07-17 PROCEDURE — 6360000002 HC RX W HCPCS: Performed by: INTERNAL MEDICINE

## 2018-07-17 PROCEDURE — 2500000003 HC RX 250 WO HCPCS: Performed by: FAMILY MEDICINE

## 2018-07-17 PROCEDURE — 6360000002 HC RX W HCPCS: Performed by: FAMILY MEDICINE

## 2018-07-17 PROCEDURE — 36415 COLL VENOUS BLD VENIPUNCTURE: CPT

## 2018-07-17 PROCEDURE — 80053 COMPREHEN METABOLIC PANEL: CPT

## 2018-07-17 PROCEDURE — S0028 INJECTION, FAMOTIDINE, 20 MG: HCPCS | Performed by: FAMILY MEDICINE

## 2018-07-17 RX ORDER — MORPHINE SULFATE 4 MG/ML
4 INJECTION, SOLUTION INTRAMUSCULAR; INTRAVENOUS
Status: DISCONTINUED | OUTPATIENT
Start: 2018-07-17 | End: 2018-07-17

## 2018-07-17 RX ORDER — LABETALOL HYDROCHLORIDE 5 MG/ML
10 INJECTION, SOLUTION INTRAVENOUS EVERY 4 HOURS PRN
Status: DISCONTINUED | OUTPATIENT
Start: 2018-07-17 | End: 2018-07-20 | Stop reason: HOSPADM

## 2018-07-17 RX ORDER — CHLORTHALIDONE 25 MG/1
50 TABLET ORAL DAILY
Status: DISCONTINUED | OUTPATIENT
Start: 2018-07-17 | End: 2018-07-20 | Stop reason: HOSPADM

## 2018-07-17 RX ORDER — SODIUM CHLORIDE 9 MG/ML
INJECTION, SOLUTION INTRAVENOUS CONTINUOUS
Status: DISCONTINUED | OUTPATIENT
Start: 2018-07-17 | End: 2018-07-18

## 2018-07-17 RX ORDER — MORPHINE SULFATE 4 MG/ML
4 INJECTION, SOLUTION INTRAMUSCULAR; INTRAVENOUS
Status: DISPENSED | OUTPATIENT
Start: 2018-07-17 | End: 2018-07-20

## 2018-07-17 RX ORDER — MORPHINE SULFATE 2 MG/ML
2 INJECTION, SOLUTION INTRAMUSCULAR; INTRAVENOUS
Status: DISCONTINUED | OUTPATIENT
Start: 2018-07-17 | End: 2018-07-17

## 2018-07-17 RX ORDER — LABETALOL HYDROCHLORIDE 5 MG/ML
10 INJECTION, SOLUTION INTRAVENOUS EVERY 4 HOURS PRN
Status: DISCONTINUED | OUTPATIENT
Start: 2018-07-17 | End: 2018-07-17

## 2018-07-17 RX ORDER — AMLODIPINE BESYLATE 10 MG/1
10 TABLET ORAL DAILY
Status: DISCONTINUED | OUTPATIENT
Start: 2018-07-17 | End: 2018-07-20 | Stop reason: HOSPADM

## 2018-07-17 RX ORDER — OXYCODONE HYDROCHLORIDE 5 MG/1
10 TABLET ORAL EVERY 4 HOURS PRN
Status: DISCONTINUED | OUTPATIENT
Start: 2018-07-17 | End: 2018-07-20 | Stop reason: HOSPADM

## 2018-07-17 RX ORDER — OXYCODONE HYDROCHLORIDE 5 MG/1
5 TABLET ORAL EVERY 4 HOURS PRN
Status: DISCONTINUED | OUTPATIENT
Start: 2018-07-17 | End: 2018-07-20 | Stop reason: HOSPADM

## 2018-07-17 RX ADMIN — SODIUM CHLORIDE: 9 INJECTION, SOLUTION INTRAVENOUS at 18:28

## 2018-07-17 RX ADMIN — FAMOTIDINE 20 MG: 10 INJECTION, SOLUTION INTRAVENOUS at 09:07

## 2018-07-17 RX ADMIN — FAMOTIDINE 20 MG: 10 INJECTION, SOLUTION INTRAVENOUS at 20:21

## 2018-07-17 RX ADMIN — DEXTROSE MONOHYDRATE 5 MG/HR: 50 INJECTION, SOLUTION INTRAVENOUS at 02:59

## 2018-07-17 RX ADMIN — DEXTROSE MONOHYDRATE 5 MG/HR: 50 INJECTION, SOLUTION INTRAVENOUS at 09:10

## 2018-07-17 RX ADMIN — FENTANYL CITRATE 75 MCG: 50 INJECTION, SOLUTION INTRAMUSCULAR; INTRAVENOUS at 06:35

## 2018-07-17 RX ADMIN — CHLORTHALIDONE 50 MG: 25 TABLET ORAL at 18:28

## 2018-07-17 RX ADMIN — PIPERACILLIN SODIUM AND TAZOBACTAM SODIUM 3.38 G: 3; .375 INJECTION, POWDER, LYOPHILIZED, FOR SOLUTION INTRAVENOUS at 03:26

## 2018-07-17 RX ADMIN — Medication 10 ML: at 09:06

## 2018-07-17 RX ADMIN — MORPHINE SULFATE 4 MG: 4 INJECTION INTRAVENOUS at 12:53

## 2018-07-17 RX ADMIN — PIPERACILLIN SODIUM AND TAZOBACTAM SODIUM 3.38 G: 3; .375 INJECTION, POWDER, LYOPHILIZED, FOR SOLUTION INTRAVENOUS at 11:28

## 2018-07-17 RX ADMIN — PIPERACILLIN SODIUM AND TAZOBACTAM SODIUM 3.38 G: 3; .375 INJECTION, POWDER, LYOPHILIZED, FOR SOLUTION INTRAVENOUS at 18:28

## 2018-07-17 RX ADMIN — Medication 10 ML: at 20:23

## 2018-07-17 RX ADMIN — OXYCODONE HYDROCHLORIDE 10 MG: 5 TABLET ORAL at 20:11

## 2018-07-17 RX ADMIN — SODIUM CHLORIDE: 9 INJECTION, SOLUTION INTRAVENOUS at 11:36

## 2018-07-17 RX ADMIN — FENTANYL CITRATE 75 MCG: 50 INJECTION, SOLUTION INTRAMUSCULAR; INTRAVENOUS at 00:54

## 2018-07-17 RX ADMIN — FENTANYL CITRATE 75 MCG: 50 INJECTION, SOLUTION INTRAMUSCULAR; INTRAVENOUS at 02:58

## 2018-07-17 RX ADMIN — METOPROLOL SUCCINATE 100 MG: 100 TABLET, FILM COATED, EXTENDED RELEASE ORAL at 09:14

## 2018-07-17 RX ADMIN — MORPHINE SULFATE 4 MG: 4 INJECTION INTRAVENOUS at 09:02

## 2018-07-17 RX ADMIN — AMLODIPINE BESYLATE 10 MG: 10 TABLET ORAL at 11:29

## 2018-07-17 RX ADMIN — PAROXETINE HYDROCHLORIDE 20 MG: 20 TABLET, FILM COATED ORAL at 09:14

## 2018-07-17 RX ADMIN — OXYCODONE HYDROCHLORIDE 5 MG: 5 TABLET ORAL at 15:10

## 2018-07-17 RX ADMIN — SODIUM CHLORIDE: 9 INJECTION, SOLUTION INTRAVENOUS at 02:50

## 2018-07-17 ASSESSMENT — PAIN DESCRIPTION - PAIN TYPE: TYPE: ACUTE PAIN

## 2018-07-17 ASSESSMENT — PAIN DESCRIPTION - FREQUENCY: FREQUENCY: CONTINUOUS

## 2018-07-17 ASSESSMENT — PAIN DESCRIPTION - DESCRIPTORS: DESCRIPTORS: ACHING

## 2018-07-17 ASSESSMENT — PAIN SCALES - GENERAL
PAINLEVEL_OUTOF10: 8
PAINLEVEL_OUTOF10: 6
PAINLEVEL_OUTOF10: 7
PAINLEVEL_OUTOF10: 3
PAINLEVEL_OUTOF10: 1
PAINLEVEL_OUTOF10: 3
PAINLEVEL_OUTOF10: 4
PAINLEVEL_OUTOF10: 2
PAINLEVEL_OUTOF10: 7
PAINLEVEL_OUTOF10: 7
PAINLEVEL_OUTOF10: 2
PAINLEVEL_OUTOF10: 4
PAINLEVEL_OUTOF10: 7

## 2018-07-17 ASSESSMENT — PAIN DESCRIPTION - LOCATION: LOCATION: ABDOMEN

## 2018-07-17 ASSESSMENT — PAIN DESCRIPTION - ONSET: ONSET: ON-GOING

## 2018-07-17 ASSESSMENT — PAIN DESCRIPTION - ORIENTATION: ORIENTATION: LOWER

## 2018-07-17 NOTE — PROGRESS NOTES
Gastroenterology  Progress Note    7/17/2018 12:48 PM  Subjective:   Admit Date: 7/15/2018    Interval History: Labs reviewed, WBC increased, LFTs slightly improved. Amylase improved, lipase remains > 600. Pt continues to complain of abdominal pain. Denies N/V. Denies constipation or diarrhea. Diet: Diet NPO Effective Now Exceptions are:  Other (See Comment), Sips with Meds    Patient Active Problem List:     Hyperaldosteronism- Dr. Skyler Sandoval polyps     GERD (gastroesophageal reflux disease)     Hypokalemia     Hyperlipidemia with target LDL less than 100     Anxiety     Hypertension due to endocrine disorder     IFG (impaired fasting glucose)     Uncontrolled hypertension     PSA elevation     Metabolic alkalosis     Primary hyperaldosteronism (HCC)     Intractable vomiting with nausea     Hypernatremia     Cholecystitis with cholelithiasis     Abnormal biliary HIDA scan     S/P laparoscopic cholecystectomy     Diastolic dysfunction without heart failure     CKD (chronic kidney disease), stage II     Hypertension due to endocrine disorder     Acute pancreatitis without necrosis or infection, unspecified     KELBY (acute kidney injury) ([facility], [address]     Essential hypertension     Lactic acidosis     Leukocytosis     Hepatitis     Hypertensive urgency        Medications:   Scheduled Meds:   amLODIPine  10 mg Oral Daily    potassium replacement protocol   Other RX Placeholder    sodium chloride flush  10 mL Intravenous 2 times per day    famotidine (PEPCID) injection  20 mg Intravenous BID    piperacillin-tazobactam  3.375 g Intravenous Q8H    metoprolol succinate  100 mg Oral Daily    PARoxetine  20 mg Oral Daily     Continuous Infusions:   sodium chloride 150 mL/hr at 07/17/18 1136    niCARdipine 4 mg/hr (07/17/18 1223)     CBC:   Recent Labs      07/16/18   0003  07/17/18   0528   WBC  11.9*  21.8*   HGB  16.7  17.2   PLT  278  222     BMP:  Recent Labs      07/16/18   0003  07/17/18   0528   NA  145 143   K  3.4*  4.0   CL  98  104   CO2  25  25   BUN  14  22   CREATININE  1.6*  1.4*   GLUCOSE  184*  157*     Hepatic: Recent Labs      07/16/18   0003  07/17/18   0528   AST  274*  142*   ALT  465*  283*   BILITOT  7.1*  5.5*   ALKPHOS  157*  119     INR:   Recent Labs      07/16/18   0003   INR  1.00   Results for Maribell Toledo (MRN [id number]) as of 7/16/2018 16:18    Ref. Range 7/16/2018 05:01   Hep A IgM Unknown Negative   Hepatitis B Surface Ag Unknown Negative   Hepatitis C Ab Unknown Negative   Hep B Core Ab, IgM Unknown Negative   Results for Maribell Toledo (MRN [id number]) as of 7/16/2018 16:18    Ref. Range 7/16/2018 08:12   Ammonia Latest Ref Range: 11 - 60 umol/L 38   Results for Maribell Toledo (MRN [id number]) as of 7/16/2018 16:18    Ref. Range 7/16/2018 05:01 7/16/2018 11:40   Lactic Acid Latest Ref Range: 0.5 - 2.2 mmol/L 3.7 (H) 5.4 (H)   Procalcitonin Latest Ref Range: 0.01 - 0.09 ng/mL 0.79 (H)     Pro-BNP Latest Ref Range: 0.0 - 900.0 pg/mL 342.1     Troponin T Latest Units: ng/ml < 0.010     Cholesterol, Total Latest Ref Range: 100 - 199 mg/dL 204 (H)     HDL Cholesterol Latest Units: mg/dL 41     LDL Calculated Latest Units: mg/dL 141     Triglycerides Latest Ref Range: 0 - 199 mg/dL 108        Xray:   PROCEDURE: MRI ABDOMEN W WO CONTRAST MRCP  7/16/2018   CLINICAL INFORMATION: Pancreatitis; abnormal liver function tests.   COMPARISON: CT abdomen/pelvis dated 7/16/2018 and MRI abdomen dated 10/6/2015.   TECHNIQUE: Routine MRI abdomen without and with IV contrast.  Routine MRCP was also performed. The MRCP examination includes 3D reconstructions of the biliary tree and the pancreatic duct.   CONTRAST: 20 mL ProHance intravenously.      FINDINGS:   LIVER:   1. There is a stable 0.9 cm subcapsular cyst along the anterior margin of the medial segment of the left hepatic lobe.   GALLBLADDER:   1. Cholecystectomy.   BILIARY TREE:  1.  The common duct is normal size measuring 0.4 cm in transverse dimension. There is no biliary dilatation. There is no choledocholithiasis.   PANCREAS:   1. There is diffuse pancreatic edema with associated peripancreatic edema and inflammation consistent with acute pancreatitis. There is homogeneous enhancement of the pancreas. There is no pancreatic necrosis. There is no pancreatic mass. Only a small portion of the pancreatic duct is seen within the pancreatic head which is not distended. The pancreatic duct within the body and tail is not identified. There is extensive edema and fluid throughout the mesentery and fluid along the lateral margin of the liver and spleen, tracking inferiorly along the colonic gutter bilaterally. There is no loculated or drainable fluid collection.   SPLEEN/ADRENAL GLANDS/KIDNEYS: Unremarkable.   BOWEL: Nonobstructive.   FREE AIR/FREE FLUID/INFLAMMATION:   1. As previously described.   LYMPHADENOPATHY:  1. There are no pathologically enlarged lymph nodes.    ABDOMINAL AORTA/IVC: Unremarkable.   LUNG BASES:   1. There are patchy atelectatic densities at both lung bases.    MUSCULOSKELETAL: Unremarkable.   OTHER: None.      Impression  1. Acute uncomplicated pancreatitis. 2. Cholecystectomy. 3. The common duct is normal size measuring 0.4 cm in transverse dimension.      **This report has been created using voice recognition software.  It may contain minor errors which are inherent in voice recognition technology. **     Final report electronically signed by Dr. Mel Mast on 7/16/2018 3:36 PM     PROCEDURE: CT ABDOMEN PELVIS W IV CONTRAST  7/16/2018   CLINICAL INFORMATION: upper abdominal pain .   COMPARISON: Abdomen pelvis CT with contrast dated 6/7/2018   TECHNIQUE: 5 mm axial CT images were obtained through the abdomen and pelvis after the administration of intravenous and oral contrast. Coronal and sagittal reconstructions were obtained.   All CT scans at this facility use dose modulation, iterative reconstruction, and/or weight-based dosing when appropriate to reduce radiation dose to as low as reasonably achievable.     FINDINGS:   Lower chest: There is mild bilateral lower lobe atelectasis.   Liver: There is no change in the 1.3 x 0.9 cm hypodense lesion in the anterior subcapsular medial segment left lobe of the liver, too small to characterize. This is stable dating back to abdomen MRI dated 10/6/2015 and is probably a benign lesion such as a cyst or hemangioma. There is fatty infiltration of the liver. There is very mild intrahepatic biliary dilatation, within normal limits postcholecystectomy.   Gallbladder/Biliary tree: Patient is status post cholecystectomy with clips in the gallbladder fossa. There is no extrahepatic biliary dilatation.   Spleen: Unremarkable. No splenomegaly.   Pancreas: There is acute pancreatitis with a moderate amount of peripancreatic fluid extending along Gerota's fascia bilaterally, more so on the left. No pseudocyst. No evidence of pancreatic necrosis or abscess. The pancreas enhances homogeneously. No   pancreatic mass or ductal dilatation.   Adrenal glands: Unremarkable. No mass.   Kidneys and ureters: Unremarkable. No hydroureteronephrosis, mass, or cyst. No renal or ureteral calculi.    Stomach, small bowel, and colon: Stomach and duodenum are unremarkable. There is mild colonic diverticulosis without diverticulitis. Small bowel and colon are otherwise unremarkable. No bowel wall thickening or bowel obstruction. Appendix: Unremarkable. No findings to suggest acute appendicitis.    Omentum and mesentery: Unremarkable   Aorta, vascular: No aortic aneurysm or dissection. No significant vascular abnormality.   Reproductive: Unremarkable   Bladder: Unremarkable. No wall thickening or obvious mass. No stones.   Intraperitoneal/retroperitoneal Space: There is no ascites, abscess, adenopathy, or mass.  No pneumoperitoneum.   Abdominal and pelvic body wall soft tissues: Unremarkable   Musculoskeletal managing. 5. Hypokalemia - resolved with replacement. 6. KELBY - improved with IV hydration. Follow up in GI Clinic after discharge in  week(s)    ANNE MARIE Taylor - CNP  7/17/2018  12:48 PM     In the setting of acute pancreatitis with abnormal LFT and high bilirubin with slow improvement , the probability  Of bile dict abnormality including CBD stone or papillary stenosis is high and CBD need to be evaluated to prevent recurrent pancreatitis , specially no intra operative cholangiogram performed .     Patient is seen independently from the nurse practitioner and all  the pertinent data along with physical examination and assessment and plans are all obtained by my self and  Laboratory data, Radiology results, medications all are reviewed by my self and care is discussed extensively with the patient  and the patients nurse and all agree with plan and in addition see orders and plans    Electronically signed by Antonino Corbin MD on 7/17/2018 at 6:39 PM

## 2018-07-17 NOTE — PROGRESS NOTES
Hospital Medicine Progress Note     Date:  7/17/2018    PCP: Lizzette Simmons MD (Tel: [phone])    Date of Admission: 7/15/2018    Chief complaint:   Chief Complaint   Patient presents with    Abdominal Pain       Brief hospital course: Pleasant 63M with hx of cholecystectomy, hypertension, who presents with complaints of epigastric abdominal pain, nausea and vomiting, as well as decreased PO intake. Lipase was elevated and CT-abd/pelvis demonstrates acute pancreatitis without evidence of pancreatic necrosis or pseudocyst or abscess. MRCP demonstrates acute uncomplicated pancreatitis. Patient denies history of alcohol use. He has not had fever or chills. On presentation, BP was elevated and patient was started on nicardipine infusion for hypertensive urgency. Assessment:  Principal Problem:    Acute pancreatitis without necrosis or infection, unspecified  Active Problems:    Hypertensive urgency    Elevated LFTs    Acute hepatitis    Hypokalemia    KELBY (acute kidney injury) (HCC)    Lactic acidosis    Leukocytosis  Resolved Problems:    * No resolved hospital problems. *        Plan:  1. Acute pancreatitis. So far, no clear etiology. TGL normal. MRCP without pancreatic lesion. On IV fluid, pain meds and currently NPO status. Increased fluid rate, especially with elevated Hb level suggestive of dehydration. GI planning endoscopic studies tomorrow. 2. Leukocytosis, likely reactive from pancreatitis. Patient does have elevated LFTs and bili; however, he remains afebrile - making cholangitis less likely. Nonetheless, he is on zosyn and we will monitor. 3. Elevated LFTs. So far, unclear etiology. Viral hepatitis profile unremarkable. In the setting of pancreatitis, GI planning ERCP. 4. Acute kidney injury. Likely secondary to underlying pancreatis and relative volume depletion. Increased fluid rate. Monitor. 5. Lactic acidosis. Secondary to severe dehydration from pancreatitis.  No obvious infections process for now - continue fluid and trend lactic acid. 6. Hypertensive urgency. Suspect secondary to pain. Weaned off nicardipine infusion. Added norvasc and chlorthalidone to metoprolol for blood pressure control. PRN IV labetalol in place. Holding home dose of lisinopril in the setting of pancreatitis of unclear etiology. Diet: Diet NPO Effective Now Exceptions are: Other (See Comment), Sips with Meds    Code status: Full Code     ----------  Subjective  Reports ongoing abdominal pain. Objective  Physical exam:  Vitals: BP (!) 147/102   Pulse 81   Temp 97.5 °F (36.4 °C) (Oral)   Resp 15   Ht 5' 11\" (1.803 m)   Wt 220 lb 9 oz (100 kg)   SpO2 93%   BMI 30.76 kg/m²   Gen: Not in distress. Alert. Oriented. Head: Normocephalic. Atraumatic. Eyes: EOMI. Good acuity. ENT: Oral mucosa moist  Neck: No JVD. No obvious thyromegaly. CVS: Nml S1S2, no MRG, RRR  Pulmomary: Clear bilaterally. No crackles. No wheezes. Gastrointestinal: Epigastric tenderness. No rebound or guarding. Musculoskeletal: No edema. Warm  Neuro: No focal deficit. Moves extremity spontaneously. Psychiatry: Appropriate affect. Not agitated. Skin: Warm, dry with normal turgor. No rash  Capillary Refill: Brisk,< 3 seconds   Peripheral Pulses: +2 palpable, equal bilaterally    24HR INTAKE/OUTPUT:    Intake/Output Summary (Last 24 hours) at 07/17/18 1644  Last data filed at 07/17/18 1316   Gross per 24 hour   Intake          3557.61 ml   Output              850 ml   Net          2707.61 ml     I/O last 3 completed shifts: In: 3557.6 [I.V.:3557.6]  Out: 850 [Urine:850]  No intake/output data recorded.       Meds:    amLODIPine  10 mg Oral Daily    chlorthalidone  50 mg Oral Daily    potassium replacement protocol   Other RX Placeholder    sodium chloride flush  10 mL Intravenous 2 times per day    famotidine (PEPCID) injection  20 mg Intravenous BID    piperacillin-tazobactam  3.375 g Intravenous Q8H    metoprolol

## 2018-07-17 NOTE — CARE COORDINATION
7/17/18  2:26 PM  Hospitalist and GI following. Cardened gtt weaned off. IV pepcid. IV zosyn. Pain control. Remains NPO. ERCP tomorrow afternoon.

## 2018-07-18 ENCOUNTER — ANESTHESIA (OUTPATIENT)
Dept: OPERATING ROOM | Age: 63
DRG: 438 | End: 2018-07-18
Payer: COMMERCIAL

## 2018-07-18 ENCOUNTER — ANESTHESIA EVENT (OUTPATIENT)
Dept: OPERATING ROOM | Age: 63
DRG: 438 | End: 2018-07-18
Payer: COMMERCIAL

## 2018-07-18 ENCOUNTER — APPOINTMENT (OUTPATIENT)
Dept: GENERAL RADIOLOGY | Age: 63
DRG: 438 | End: 2018-07-18
Payer: COMMERCIAL

## 2018-07-18 VITALS
RESPIRATION RATE: 17 BRPM | OXYGEN SATURATION: 98 % | DIASTOLIC BLOOD PRESSURE: 61 MMHG | TEMPERATURE: 97.7 F | SYSTOLIC BLOOD PRESSURE: 89 MMHG

## 2018-07-18 LAB
ALBUMIN SERPL-MCNC: 2.9 G/DL (ref 3.5–5.1)
ALBUMIN SERPL-MCNC: 3 G/DL (ref 3.5–5.1)
ALP BLD-CCNC: 100 U/L (ref 38–126)
ALP BLD-CCNC: 98 U/L (ref 38–126)
ALT SERPL-CCNC: 138 U/L (ref 11–66)
ALT SERPL-CCNC: 172 U/L (ref 11–66)
ANA SCREEN: NORMAL
ANION GAP SERPL CALCULATED.3IONS-SCNC: 16 MEQ/L (ref 8–16)
AST SERPL-CCNC: 69 U/L (ref 5–40)
AST SERPL-CCNC: 80 U/L (ref 5–40)
BASOPHILS # BLD: 0 %
BASOPHILS ABSOLUTE: 0 THOU/MM3 (ref 0–0.1)
BILIRUB SERPL-MCNC: 3.1 MG/DL (ref 0.3–1.2)
BILIRUB SERPL-MCNC: 3.8 MG/DL (ref 0.3–1.2)
BILIRUBIN DIRECT: 0.9 MG/DL (ref 0–0.3)
BUN BLDV-MCNC: 18 MG/DL (ref 7–22)
CA 19-9: 109 U/ML (ref 0–35)
CALCIUM SERPL-MCNC: 7.7 MG/DL (ref 8.5–10.5)
CHLORIDE BLD-SCNC: 100 MEQ/L (ref 98–111)
CO2: 26 MEQ/L (ref 23–33)
CREAT SERPL-MCNC: 1.1 MG/DL (ref 0.4–1.2)
EOSINOPHIL # BLD: 0 %
EOSINOPHILS ABSOLUTE: 0 THOU/MM3 (ref 0–0.4)
ERYTHROCYTE [DISTWIDTH] IN BLOOD BY AUTOMATED COUNT: 15.7 % (ref 11.5–14.5)
ERYTHROCYTE [DISTWIDTH] IN BLOOD BY AUTOMATED COUNT: 48.9 FL (ref 35–45)
GFR SERPL CREATININE-BSD FRML MDRD: 68 ML/MIN/1.73M2
GLUCOSE BLD-MCNC: 124 MG/DL (ref 70–108)
HCT VFR BLD CALC: 47 % (ref 42–52)
HEMOGLOBIN: 16.2 GM/DL (ref 14–18)
LIPASE: 229.8 U/L (ref 5.6–51.3)
LYMPHOCYTES # BLD: 8 %
LYMPHOCYTES ABSOLUTE: 1.4 THOU/MM3 (ref 1–4.8)
MAGNESIUM: 1.7 MG/DL (ref 1.6–2.4)
MCH RBC QN AUTO: 29.9 PG (ref 26–33)
MCHC RBC AUTO-ENTMCNC: 34.5 GM/DL (ref 32.2–35.5)
MCV RBC AUTO: 86.7 FL (ref 80–94)
MONOCYTES # BLD: 9 %
MONOCYTES ABSOLUTE: 1.5 THOU/MM3 (ref 0.4–1.3)
MRSA SCREEN: NORMAL
NUCLEATED RED BLOOD CELLS: 0 /100 WBC
PHOSPHORUS: 1.5 MG/DL (ref 2.4–4.7)
PLATELET # BLD: 179 THOU/MM3 (ref 130–400)
PMV BLD AUTO: 11 FL (ref 9.4–12.4)
POTASSIUM SERPL-SCNC: 3.3 MEQ/L (ref 3.5–5.2)
RBC # BLD: 5.42 MILL/MM3 (ref 4.7–6.1)
SEG NEUTROPHILS: 83 %
SEGMENTED NEUTROPHILS ABSOLUTE COUNT: 14.3 THOU/MM3 (ref 1.8–7.7)
SODIUM BLD-SCNC: 142 MEQ/L (ref 135–145)
TOTAL PROTEIN: 6.4 G/DL (ref 6.1–8)
TOTAL PROTEIN: 6.6 G/DL (ref 6.1–8)
VRE CULTURE: NORMAL
WBC # BLD: 17.2 THOU/MM3 (ref 4.8–10.8)

## 2018-07-18 PROCEDURE — 80076 HEPATIC FUNCTION PANEL: CPT

## 2018-07-18 PROCEDURE — S0028 INJECTION, FAMOTIDINE, 20 MG: HCPCS | Performed by: FAMILY MEDICINE

## 2018-07-18 PROCEDURE — 86301 IMMUNOASSAY TUMOR CA 19-9: CPT

## 2018-07-18 PROCEDURE — 7100000001 HC PACU RECOVERY - ADDTL 15 MIN: Performed by: INTERNAL MEDICINE

## 2018-07-18 PROCEDURE — 6360000002 HC RX W HCPCS: Performed by: INTERNAL MEDICINE

## 2018-07-18 PROCEDURE — 83735 ASSAY OF MAGNESIUM: CPT

## 2018-07-18 PROCEDURE — 2500000003 HC RX 250 WO HCPCS: Performed by: INTERNAL MEDICINE

## 2018-07-18 PROCEDURE — 80053 COMPREHEN METABOLIC PANEL: CPT

## 2018-07-18 PROCEDURE — 3600007503: Performed by: INTERNAL MEDICINE

## 2018-07-18 PROCEDURE — 2580000003 HC RX 258: Performed by: INTERNAL MEDICINE

## 2018-07-18 PROCEDURE — 7100000000 HC PACU RECOVERY - FIRST 15 MIN: Performed by: INTERNAL MEDICINE

## 2018-07-18 PROCEDURE — BF101ZZ FLUOROSCOPY OF BILE DUCTS USING LOW OSMOLAR CONTRAST: ICD-10-PCS | Performed by: INTERNAL MEDICINE

## 2018-07-18 PROCEDURE — 2500000003 HC RX 250 WO HCPCS: Performed by: NURSE ANESTHETIST, CERTIFIED REGISTERED

## 2018-07-18 PROCEDURE — 85025 COMPLETE CBC W/AUTO DIFF WBC: CPT

## 2018-07-18 PROCEDURE — 74328 X-RAY BILE DUCT ENDOSCOPY: CPT

## 2018-07-18 PROCEDURE — C1769 GUIDE WIRE: HCPCS | Performed by: INTERNAL MEDICINE

## 2018-07-18 PROCEDURE — 2580000003 HC RX 258: Performed by: FAMILY MEDICINE

## 2018-07-18 PROCEDURE — 2720000010 HC SURG SUPPLY STERILE: Performed by: INTERNAL MEDICINE

## 2018-07-18 PROCEDURE — 2709999900 HC NON-CHARGEABLE SUPPLY: Performed by: INTERNAL MEDICINE

## 2018-07-18 PROCEDURE — 3700000000 HC ANESTHESIA ATTENDED CARE: Performed by: INTERNAL MEDICINE

## 2018-07-18 PROCEDURE — 6360000002 HC RX W HCPCS: Performed by: NURSE ANESTHETIST, CERTIFIED REGISTERED

## 2018-07-18 PROCEDURE — 3600007513: Performed by: INTERNAL MEDICINE

## 2018-07-18 PROCEDURE — 36415 COLL VENOUS BLD VENIPUNCTURE: CPT

## 2018-07-18 PROCEDURE — 84100 ASSAY OF PHOSPHORUS: CPT

## 2018-07-18 PROCEDURE — 2580000003 HC RX 258: Performed by: NURSE ANESTHETIST, CERTIFIED REGISTERED

## 2018-07-18 PROCEDURE — 2060000000 HC ICU INTERMEDIATE R&B

## 2018-07-18 PROCEDURE — 2500000003 HC RX 250 WO HCPCS: Performed by: FAMILY MEDICINE

## 2018-07-18 PROCEDURE — 99233 SBSQ HOSP IP/OBS HIGH 50: CPT | Performed by: INTERNAL MEDICINE

## 2018-07-18 PROCEDURE — 74330 X-RAY BILE/PANC ENDOSCOPY: CPT

## 2018-07-18 PROCEDURE — 6370000000 HC RX 637 (ALT 250 FOR IP): Performed by: INTERNAL MEDICINE

## 2018-07-18 PROCEDURE — 3700000001 HC ADD 15 MINUTES (ANESTHESIA): Performed by: INTERNAL MEDICINE

## 2018-07-18 PROCEDURE — 0F798ZZ DILATION OF COMMON BILE DUCT, VIA NATURAL OR ARTIFICIAL OPENING ENDOSCOPIC: ICD-10-PCS | Performed by: INTERNAL MEDICINE

## 2018-07-18 PROCEDURE — 83690 ASSAY OF LIPASE: CPT

## 2018-07-18 PROCEDURE — 2709999900 HC NON-CHARGEABLE SUPPLY

## 2018-07-18 PROCEDURE — 6360000002 HC RX W HCPCS: Performed by: FAMILY MEDICINE

## 2018-07-18 RX ORDER — LOSARTAN POTASSIUM 50 MG/1
50 TABLET ORAL DAILY
Status: DISCONTINUED | OUTPATIENT
Start: 2018-07-18 | End: 2018-07-18

## 2018-07-18 RX ORDER — SODIUM CHLORIDE 9 MG/ML
INJECTION, SOLUTION INTRAVENOUS CONTINUOUS PRN
Status: DISCONTINUED | OUTPATIENT
Start: 2018-07-18 | End: 2018-07-18 | Stop reason: SDUPTHER

## 2018-07-18 RX ORDER — DEXAMETHASONE SODIUM PHOSPHATE 4 MG/ML
INJECTION, SOLUTION INTRA-ARTICULAR; INTRALESIONAL; INTRAMUSCULAR; INTRAVENOUS; SOFT TISSUE PRN
Status: DISCONTINUED | OUTPATIENT
Start: 2018-07-18 | End: 2018-07-18 | Stop reason: SDUPTHER

## 2018-07-18 RX ORDER — FENTANYL CITRATE 50 UG/ML
INJECTION, SOLUTION INTRAMUSCULAR; INTRAVENOUS PRN
Status: DISCONTINUED | OUTPATIENT
Start: 2018-07-18 | End: 2018-07-18 | Stop reason: SDUPTHER

## 2018-07-18 RX ORDER — SUCCINYLCHOLINE CHLORIDE 20 MG/ML
INJECTION INTRAMUSCULAR; INTRAVENOUS PRN
Status: DISCONTINUED | OUTPATIENT
Start: 2018-07-18 | End: 2018-07-18 | Stop reason: SDUPTHER

## 2018-07-18 RX ORDER — FENTANYL CITRATE 50 UG/ML
25 INJECTION, SOLUTION INTRAMUSCULAR; INTRAVENOUS EVERY 5 MIN PRN
Status: DISCONTINUED | OUTPATIENT
Start: 2018-07-18 | End: 2018-07-18 | Stop reason: HOSPADM

## 2018-07-18 RX ORDER — MEPERIDINE HYDROCHLORIDE 25 MG/ML
25 INJECTION INTRAMUSCULAR; INTRAVENOUS; SUBCUTANEOUS
Status: DISCONTINUED | OUTPATIENT
Start: 2018-07-18 | End: 2018-07-18 | Stop reason: HOSPADM

## 2018-07-18 RX ORDER — EPLERENONE 25 MG/1
50 TABLET, FILM COATED ORAL 2 TIMES DAILY
Status: DISCONTINUED | OUTPATIENT
Start: 2018-07-18 | End: 2018-07-20 | Stop reason: HOSPADM

## 2018-07-18 RX ORDER — ONDANSETRON 2 MG/ML
INJECTION INTRAMUSCULAR; INTRAVENOUS PRN
Status: DISCONTINUED | OUTPATIENT
Start: 2018-07-18 | End: 2018-07-18 | Stop reason: SDUPTHER

## 2018-07-18 RX ORDER — DIPHENHYDRAMINE HYDROCHLORIDE 50 MG/ML
12.5 INJECTION INTRAMUSCULAR; INTRAVENOUS
Status: ACTIVE | OUTPATIENT
Start: 2018-07-18 | End: 2018-07-18

## 2018-07-18 RX ORDER — PROPOFOL 10 MG/ML
INJECTION, EMULSION INTRAVENOUS PRN
Status: DISCONTINUED | OUTPATIENT
Start: 2018-07-18 | End: 2018-07-18 | Stop reason: SDUPTHER

## 2018-07-18 RX ORDER — FENTANYL CITRATE 50 UG/ML
50 INJECTION, SOLUTION INTRAMUSCULAR; INTRAVENOUS EVERY 5 MIN PRN
Status: DISCONTINUED | OUTPATIENT
Start: 2018-07-18 | End: 2018-07-18 | Stop reason: HOSPADM

## 2018-07-18 RX ORDER — POTASSIUM CHLORIDE 7.45 MG/ML
10 INJECTION INTRAVENOUS
Status: COMPLETED | OUTPATIENT
Start: 2018-07-18 | End: 2018-07-18

## 2018-07-18 RX ORDER — POTASSIUM CHLORIDE AND SODIUM CHLORIDE 900; 300 MG/100ML; MG/100ML
INJECTION, SOLUTION INTRAVENOUS CONTINUOUS
Status: DISCONTINUED | OUTPATIENT
Start: 2018-07-18 | End: 2018-07-20 | Stop reason: HOSPADM

## 2018-07-18 RX ORDER — LABETALOL HYDROCHLORIDE 5 MG/ML
5 INJECTION, SOLUTION INTRAVENOUS EVERY 10 MIN PRN
Status: DISCONTINUED | OUTPATIENT
Start: 2018-07-18 | End: 2018-07-18 | Stop reason: HOSPADM

## 2018-07-18 RX ORDER — LIDOCAINE HYDROCHLORIDE 20 MG/ML
INJECTION, SOLUTION INFILTRATION; PERINEURAL PRN
Status: DISCONTINUED | OUTPATIENT
Start: 2018-07-18 | End: 2018-07-18 | Stop reason: SDUPTHER

## 2018-07-18 RX ORDER — PROMETHAZINE HYDROCHLORIDE 25 MG/ML
12.5 INJECTION, SOLUTION INTRAMUSCULAR; INTRAVENOUS
Status: DISCONTINUED | OUTPATIENT
Start: 2018-07-18 | End: 2018-07-18 | Stop reason: HOSPADM

## 2018-07-18 RX ADMIN — SODIUM CHLORIDE: 9 INJECTION, SOLUTION INTRAVENOUS at 16:04

## 2018-07-18 RX ADMIN — POTASSIUM CHLORIDE AND SODIUM CHLORIDE: 900; 300 INJECTION, SOLUTION INTRAVENOUS at 10:53

## 2018-07-18 RX ADMIN — EPLERENONE 50 MG: 25 TABLET, FILM COATED ORAL at 19:52

## 2018-07-18 RX ADMIN — SODIUM CHLORIDE: 9 INJECTION, SOLUTION INTRAVENOUS at 08:37

## 2018-07-18 RX ADMIN — OXYCODONE HYDROCHLORIDE 5 MG: 5 TABLET ORAL at 19:51

## 2018-07-18 RX ADMIN — OXYCODONE HYDROCHLORIDE 10 MG: 5 TABLET ORAL at 08:38

## 2018-07-18 RX ADMIN — METOPROLOL SUCCINATE 100 MG: 100 TABLET, FILM COATED, EXTENDED RELEASE ORAL at 08:37

## 2018-07-18 RX ADMIN — PIPERACILLIN SODIUM AND TAZOBACTAM SODIUM 3.38 G: 3; .375 INJECTION, POWDER, LYOPHILIZED, FOR SOLUTION INTRAVENOUS at 04:20

## 2018-07-18 RX ADMIN — OXYCODONE HYDROCHLORIDE 10 MG: 5 TABLET ORAL at 04:21

## 2018-07-18 RX ADMIN — POTASSIUM CHLORIDE 10 MEQ: 7.46 INJECTION, SOLUTION INTRAVENOUS at 10:54

## 2018-07-18 RX ADMIN — ONDANSETRON HYDROCHLORIDE 4 MG: 4 INJECTION, SOLUTION INTRAMUSCULAR; INTRAVENOUS at 16:16

## 2018-07-18 RX ADMIN — PROPOFOL 170 MG: 10 INJECTION, EMULSION INTRAVENOUS at 16:07

## 2018-07-18 RX ADMIN — PIPERACILLIN SODIUM AND TAZOBACTAM SODIUM 3.38 G: 3; .375 INJECTION, POWDER, LYOPHILIZED, FOR SOLUTION INTRAVENOUS at 12:25

## 2018-07-18 RX ADMIN — FENTANYL CITRATE 50 MCG: 50 INJECTION INTRAMUSCULAR; INTRAVENOUS at 16:50

## 2018-07-18 RX ADMIN — LOSARTAN POTASSIUM 50 MG: 50 TABLET, FILM COATED ORAL at 12:28

## 2018-07-18 RX ADMIN — OXYCODONE HYDROCHLORIDE 10 MG: 5 TABLET ORAL at 00:12

## 2018-07-18 RX ADMIN — POTASSIUM CHLORIDE AND SODIUM CHLORIDE: 900; 300 INJECTION, SOLUTION INTRAVENOUS at 19:52

## 2018-07-18 RX ADMIN — FENTANYL CITRATE 50 MCG: 50 INJECTION INTRAMUSCULAR; INTRAVENOUS at 16:07

## 2018-07-18 RX ADMIN — DEXAMETHASONE SODIUM PHOSPHATE 10 MG: 4 INJECTION, SOLUTION INTRAMUSCULAR; INTRAVENOUS at 16:16

## 2018-07-18 RX ADMIN — FAMOTIDINE 20 MG: 10 INJECTION, SOLUTION INTRAVENOUS at 19:51

## 2018-07-18 RX ADMIN — AMLODIPINE BESYLATE 10 MG: 10 TABLET ORAL at 08:37

## 2018-07-18 RX ADMIN — SODIUM CHLORIDE: 9 INJECTION, SOLUTION INTRAVENOUS at 00:15

## 2018-07-18 RX ADMIN — PIPERACILLIN SODIUM AND TAZOBACTAM SODIUM 3.38 G: 3; .375 INJECTION, POWDER, LYOPHILIZED, FOR SOLUTION INTRAVENOUS at 18:47

## 2018-07-18 RX ADMIN — Medication 140 MG: at 16:07

## 2018-07-18 RX ADMIN — PAROXETINE HYDROCHLORIDE 20 MG: 20 TABLET, FILM COATED ORAL at 08:38

## 2018-07-18 RX ADMIN — POTASSIUM CHLORIDE 10 MEQ: 7.46 INJECTION, SOLUTION INTRAVENOUS at 12:22

## 2018-07-18 RX ADMIN — FAMOTIDINE 20 MG: 10 INJECTION, SOLUTION INTRAVENOUS at 08:38

## 2018-07-18 RX ADMIN — LABETALOL HYDROCHLORIDE 10 MG: 5 INJECTION INTRAVENOUS at 00:15

## 2018-07-18 RX ADMIN — Medication 10 ML: at 19:51

## 2018-07-18 RX ADMIN — LIDOCAINE HYDROCHLORIDE 100 MG: 20 INJECTION, SOLUTION INFILTRATION; PERINEURAL at 16:07

## 2018-07-18 RX ADMIN — CHLORTHALIDONE 50 MG: 25 TABLET ORAL at 08:38

## 2018-07-18 ASSESSMENT — PAIN SCALES - GENERAL
PAINLEVEL_OUTOF10: 7
PAINLEVEL_OUTOF10: 0
PAINLEVEL_OUTOF10: 7
PAINLEVEL_OUTOF10: 8
PAINLEVEL_OUTOF10: 0
PAINLEVEL_OUTOF10: 0
PAINLEVEL_OUTOF10: 5

## 2018-07-18 ASSESSMENT — PULMONARY FUNCTION TESTS
PIF_VALUE: 14
PIF_VALUE: 0
PIF_VALUE: 2
PIF_VALUE: 3
PIF_VALUE: 11
PIF_VALUE: 4
PIF_VALUE: 3
PIF_VALUE: 2
PIF_VALUE: 3
PIF_VALUE: 22
PIF_VALUE: 3
PIF_VALUE: 6
PIF_VALUE: 5
PIF_VALUE: 2
PIF_VALUE: 23
PIF_VALUE: 18
PIF_VALUE: 7
PIF_VALUE: 18
PIF_VALUE: 8
PIF_VALUE: 2
PIF_VALUE: 2
PIF_VALUE: 1
PIF_VALUE: 15
PIF_VALUE: 2
PIF_VALUE: 3
PIF_VALUE: 3
PIF_VALUE: 18
PIF_VALUE: 3
PIF_VALUE: 2
PIF_VALUE: 16
PIF_VALUE: 3
PIF_VALUE: 18
PIF_VALUE: 20
PIF_VALUE: 1
PIF_VALUE: 4
PIF_VALUE: 2
PIF_VALUE: 6
PIF_VALUE: 4
PIF_VALUE: 3
PIF_VALUE: 10
PIF_VALUE: 4
PIF_VALUE: 2
PIF_VALUE: 2

## 2018-07-18 ASSESSMENT — PAIN DESCRIPTION - DESCRIPTORS: DESCRIPTORS: HEADACHE

## 2018-07-18 ASSESSMENT — PAIN DESCRIPTION - LOCATION: LOCATION: HEAD

## 2018-07-18 ASSESSMENT — PAIN DESCRIPTION - PAIN TYPE: TYPE: ACUTE PAIN

## 2018-07-18 NOTE — ANESTHESIA PRE PROCEDURE
Laterality Date    CHOLECYSTECTOMY      COLONOSCOPY  05/2013    Dr. Moises Hayes COLONOSCOPY  04/2018    Dr Osman Phillip N/A 6/9/2018    LAPAROSCOPIC CHOLECYSTECTOMY performed by Demarcus Mercado DO at [address]  11/06/2012    Behind right ear and left ring finger       Social History:    Social History   Substance Use Topics    Smoking status: Never Smoker    Smokeless tobacco: Never Used    Alcohol use No                                Counseling given: Not Answered      Vital Signs (Current):   Vitals:    07/18/18 0832 07/18/18 0848 07/18/18 1117 07/18/18 1537   BP: (!) 183/107 (!) 154/100 (!) 161/93 (!) 170/98   Pulse: 96  96 91   Resp: 18 18 18   Temp: 97.6 °F (36.4 °C)  97.7 °F (36.5 °C) 98.5 °F (36.9 °C)   TempSrc: Oral  Oral Oral   SpO2: 94%  92% 94%   Weight:       Height:                                                  BP Readings from Last 3 Encounters:   07/18/18 (!) 170/98   06/22/18 138/80   06/12/18 120/80       NPO Status:                                                                                 BMI:   Wt Readings from Last 3 Encounters:   07/18/18 224 lb 12.8 oz (102 kg)   06/22/18 225 lb 11.2 oz (102.4 kg)   06/12/18 217 lb (98.4 kg)     Body mass index is 31.35 kg/m².     CBC:   Lab Results   Component Value Date    WBC 17.2 07/18/2018    RBC 5.42 07/18/2018    RBC 5.19 09/05/2011    HGB 16.2 07/18/2018    HCT 47.0 07/18/2018    MCV 86.7 07/18/2018    RDW 15.1 06/10/2018     07/18/2018       CMP:   Lab Results   Component Value Date     07/18/2018    K 3.3 07/18/2018    K 3.4 07/16/2018     07/18/2018    CO2 26 07/18/2018    BUN 18 07/18/2018    CREATININE 1.1 07/18/2018    LABGLOM 68 07/18/2018    GLUCOSE 124 07/18/2018    GLUCOSE 97 02/20/2012    PROT 6.4 07/18/2018    CALCIUM 7.7 07/18/2018    BILITOT 3.8 07/18/2018    ALKPHOS 100 07/18/2018    AST 80 07/18/2018     07/18/2018       POC Tests: No results for input(s): POCGLU, Victoria Plush, POCCL, POCBUN, POCHEMO, POCHCT in the last 72 hours. Coags:   Lab Results   Component Value Date    INR 1.00 07/16/2018    APTT 33.4 06/09/2018       HCG (If Applicable): No results found for: PREGTESTUR, PREGSERUM, HCG, HCGQUANT     ABGs: No results found for: PHART, PO2ART, AHD4GVW, BMT7SHA, BEART, V0LWQFQJ     Type & Screen (If Applicable):  No results found for: LABABO, [address]    Anesthesia Evaluation  Patient summary reviewed and Nursing notes reviewed  Airway: Mallampati: II  TM distance: >3 FB   Neck ROM: full  Mouth opening: > = 3 FB Dental: normal exam         Pulmonary:Negative Pulmonary ROS                              Cardiovascular:  Exercise tolerance: good (>4 METS),   (+) hypertension:,          Beta Blocker:  Dose within 24 Hrs      ROS comment: EF 55%     Neuro/Psych:   Negative Neuro/Psych ROS              GI/Hepatic/Renal:   (+) GERD: well controlled, renal disease: CRI,           Endo/Other: Negative Endo/Other ROS                    Abdominal:           Vascular:                                        Anesthesia Plan      general     ASA 2       Induction: intravenous. MIPS: Postoperative opioids intended and Prophylactic antiemetics administered. Anesthetic plan and risks discussed with patient and spouse. Plan discussed with CRNA. Dinesh Gaona MD   7/18/2018

## 2018-07-18 NOTE — CARE COORDINATION
UPDATE:    K+ 3.3 with replacement orders, ERCP today at 4PM, WBC 17.2 (was 21), pain control, plans home with wife

## 2018-07-18 NOTE — PROGRESS NOTES
Hospital Medicine Progress Note     Date:  7/18/2018    PCP: Tam Romero MD (Tel: [phone])    Date of Admission: 7/15/2018    Chief complaint:   Chief Complaint   Patient presents with    Abdominal Pain       Brief hospital course: Pleasant 63M with hx of cholecystectomy, hypertension, who presents with complaints of epigastric abdominal pain, nausea and vomiting, as well as decreased PO intake. Lipase was elevated and CT-abd/pelvis demonstrates acute pancreatitis without evidence of pancreatic necrosis or pseudocyst or abscess. MRCP demonstrates acute uncomplicated pancreatitis. Patient denies history of alcohol use. He has not had fever or chills. On presentation, BP was elevated and patient was started on nicardipine infusion for hypertensive urgency. Assessment:  Principal Problem:    Acute pancreatitis without necrosis or infection, unspecified  Active Problems:    Hypertensive urgency    Elevated LFTs    Acute hepatitis    Hypokalemia    KELBY (acute kidney injury) (HCC)    Lactic acidosis    Leukocytosis  Resolved Problems:    * No resolved hospital problems. *        Plan:  1. Acute pancreatitis. So far, no clear etiology. TGL normal. MRCP without pancreatic lesion. On IV fluid, pain meds and currently NPO status. Continue IV fluid as ordered. GI planning ERCP today. 2. Leukocytosis, likely reactive from pancreatitis. Patient does have elevated LFTs and bili; however, he remains afebrile - making cholangitis less likely. Nonetheless, he is on zosyn and we will monitor. 3. Elevated LFTs. So far, unclear etiology - ?cholestasis. Viral hepatitis profile unremarkable. Overall, LFTs appear to be improving. 4. Acute kidney injury, resolved. Likely secondary to underlying pancreatis and relative volume depletion. Continue IV fluid. 5. Lactic acidosis, resolved with hydration. Secondary to severe dehydration from pancreatitis. No obvious infections process for now.   6. Hypertensive  piperacillin-tazobactam  3.375 g Intravenous Q8H    metoprolol succinate  100 mg Oral Daily    PARoxetine  20 mg Oral Daily       Infusions:    0.9% NaCl with KCl 40 mEq 150 mL/hr at 07/18/18 1053         PRN Meds: oxyCODONE **OR** oxyCODONE, [DISCONTINUED] morphine **OR** morphine, labetalol, sodium phosphate IVPB **OR** sodium phosphate IVPB, magnesium sulfate, sodium chloride flush, ondansetron, hydrALAZINE    Labs/imaging:  CBC:   Recent Labs      07/16/18   0003  07/17/18   0528  07/18/18   0529   WBC  11.9*  21.8*  17.2*   HGB  16.7  17.2  16.2   PLT  278  222  179         BMP:    Recent Labs      07/16/18 0003 07/17/18   0528  07/18/18   0529   NA  145  143  142   K  3.4*  4.0  3.3*   CL  98  104  100   CO2  25  25  26   BUN  14  22  18   CREATININE  1.6*  1.4*  1.1   GLUCOSE  184*  157*  124*         Hepatic:   Recent Labs      07/16/18 0003 07/17/18   0528  07/18/18   0529   AST  274*  142*  80*   ALT  465*  283*  172*   BILITOT  7.1*  5.5*  3.8*   ALKPHOS  157*  119  100       INR:   Recent Labs      07/16/18   0003   INR  1.00         Noemy Schaeffer MD  -------------------------------  Rounding hospitalist

## 2018-07-18 NOTE — PROGRESS NOTES
1725  Patient meets PACU D/C criteria at this time. Patient is awake and alert on 2L NC and VSS.   Report called to [facility].  3126  Patient transported back to  room 18 on 2L NC.

## 2018-07-18 NOTE — PRE SEDATION
Sedation/Analgesia History & Physical    Patient: Yennifer Varma : 1955  [facility] Rec#: [id number] Acc#: [id number]   Provider Performing Procedure: Osbaldo Jung  Primary Care Physician: Dmitri Hung MD    PRE-PROCEDURE   Full CODE [x]Yes  DNR-CCA/DNR-CC []Yes   Brief History/Pre-Procedure Diagnosis:  Gallstone pancreatitis           MEDICAL HISTORY    []Additional information:       has a past medical history of Gallbladder disease; GERD (gastroesophageal reflux disease); HTN (hypertension); Hyperaldosteronism ([address] Hypertension; Hypokalemia; Metabolic alkalosis; and Prostate troubles. SOCIAL HISTORY  Social History     Tobacco History     Smoking Status  Never Smoker    Smokeless Tobacco Use  Never Used          Alcohol History     Alcohol Use Status  No          Drug Use     Drug Use Status  No          Sexual Activity     Sexually Active  Yes Partners  Female                FAMILY HISTORY     Family History   Problem Relation Age of Onset    Diabetes Father     Colon Polyps Father     Diabetes Brother     Colon Polyps Paternal Uncle     Colon Cancer Neg Hx     Breast Cancer Neg Hx     Cancer Neg Hx        SURGICAL HISTORY   has a past surgical history that includes skin biopsy (2012); Colonoscopy (2013); pr lap,cholecystectomy (N/A, 2018); Cholecystectomy; and Colonoscopy (2018).   Additional information:       ALLERGIES   Allergies as of 07/15/2018 - Review Complete 07/15/2018   Allergen Reaction Noted    Lipitor [atorvastatin] Other (See Comments) 2015    Aldactone [spironolactone] Other (See Comments) 2016     Additional information:       MEDICATIONS   Coumadin Use Last 7 Days [x]No []Yes  Antiplatelet drug therapy use last 7 days  [x]No []Yes  Other anticoagulant use last 7 days  [x]No []Yes    Current Facility-Administered Medications:     0.9% NaCl with KCl 40 mEq infusion, , Intravenous, Continuous, Tc Darby MD, Last Rate: 150 mL/hr at 18 1053    eplerenone (INSPRA) tablet 50 mg, 50 mg, Oral, BID, Aimee Flores MD    fentaNYL (SUBLIMAZE) injection 25 mcg, 25 mcg, Intravenous, Q5 Min PRN, Karey Allen MD    HYDROmorphone (DILAUDID) injection 0.5 mg, 0.5 mg, Intravenous, Q5 Min PRN, Karey Allen MD    HYDROmorphone (DILAUDID) injection 0.25 mg, 0.25 mg, Intravenous, Q5 Min PRN, Karey Allen MD    fentaNYL (SUBLIMAZE) injection 50 mcg, 50 mcg, Intravenous, Q5 Min PRN, Karey Allen MD    diphenhydrAMINE (BENADRYL) injection 12.5 mg, 12.5 mg, Intravenous, Once PRN, Karey Allen MD    promethazine [facility]) injection 12.5 mg, 12.5 mg, Intramuscular, Once PRN, Karey Allen MD    labetalol (NORMODYNE;TRANDATE) injection 5 mg, 5 mg, Intravenous, Q10 Min PRN, Karey Allen MD    meperidine (DEMEROL) injection 25 mg, 25 mg, Intravenous, Once PRN, Karey Allen MD    amLODIPine (NORVASC) tablet 10 mg, 10 mg, Oral, Daily, Aimee Flores MD, 10 mg at 07/18/18 1805    oxyCODONE (ROXICODONE) immediate release tablet 5 mg, 5 mg, Oral, Q4H PRN, 5 mg at 07/17/18 1510 **OR** oxyCODONE (ROXICODONE) immediate release tablet 10 mg, 10 mg, Oral, Q4H PRN, Aimee Flores MD, 10 mg at 07/18/18 0838    [DISCONTINUED] morphine injection 2 mg, 2 mg, Intravenous, Q2H PRN **OR** morphine (PF) injection 4 mg, 4 mg, Intravenous, Q2H PRN, Aimee Flores MD, 4 mg at 07/17/18 1253    chlorthalidone (HYGROTON) tablet 50 mg, 50 mg, Oral, Daily, Aimee Flores MD, 50 mg at 07/18/18 0838    labetalol (NORMODYNE;TRANDATE) injection 10 mg, 10 mg, Intravenous, Q4H PRN, Aimee Flores MD, 10 mg at 07/18/18 0015    sodium phosphate 15.99 mmol in dextrose 5 % 250 mL IVPB, 0.16 mmol/kg, Intravenous, PRN **OR** sodium phosphate 32.01 mmol in dextrose 5 % 250 mL IVPB, 0.32 mmol/kg, Intravenous, PRN, Aimee Flores MD    magnesium sulfate 1 g in dextrose 5 % 100 mL IVPB, 1 g, Intravenous, PRN, Aimee Flores MD    potassium anesthesia. (Refer to nursing sedation/analgesia documentation record)  [x]Formulation and discussion of sedation/procedure plan, risks, and expectations with patient and/or responsible adult completed. [x]Patient examined immediately prior to the procedure.  (Refer to nursing sedation/analgesia documentation record)    Clark Vela MD   Electronically signed 7/18/2018

## 2018-07-19 LAB
ALBUMIN SERPL-MCNC: 2.7 G/DL (ref 3.5–5.1)
ALP BLD-CCNC: 98 U/L (ref 38–126)
ALT SERPL-CCNC: 121 U/L (ref 11–66)
AMYLASE: 151 U/L (ref 20–104)
ANION GAP SERPL CALCULATED.3IONS-SCNC: 13 MEQ/L (ref 8–16)
AST SERPL-CCNC: 51 U/L (ref 5–40)
BASOPHILS # BLD: 0.2 %
BASOPHILS ABSOLUTE: 0 THOU/MM3 (ref 0–0.1)
BILIRUB SERPL-MCNC: 2.1 MG/DL (ref 0.3–1.2)
BUN BLDV-MCNC: 20 MG/DL (ref 7–22)
CALCIUM SERPL-MCNC: 7.9 MG/DL (ref 8.5–10.5)
CHLORIDE BLD-SCNC: 102 MEQ/L (ref 98–111)
CO2: 24 MEQ/L (ref 23–33)
CREAT SERPL-MCNC: 1.1 MG/DL (ref 0.4–1.2)
EOSINOPHIL # BLD: 0 %
EOSINOPHILS ABSOLUTE: 0 THOU/MM3 (ref 0–0.4)
ERYTHROCYTE [DISTWIDTH] IN BLOOD BY AUTOMATED COUNT: 15.5 % (ref 11.5–14.5)
ERYTHROCYTE [DISTWIDTH] IN BLOOD BY AUTOMATED COUNT: 49 FL (ref 35–45)
GFR SERPL CREATININE-BSD FRML MDRD: 68 ML/MIN/1.73M2
GLUCOSE BLD-MCNC: 147 MG/DL (ref 70–108)
HCT VFR BLD CALC: 43.2 % (ref 42–52)
HEMOGLOBIN: 14.4 GM/DL (ref 14–18)
IMMATURE GRANS (ABS): 0.05 THOU/MM3 (ref 0–0.07)
IMMATURE GRANULOCYTES: 0.4 %
LIPASE: 50.7 U/L (ref 5.6–51.3)
LYMPHOCYTES # BLD: 6.2 %
LYMPHOCYTES ABSOLUTE: 0.8 THOU/MM3 (ref 1–4.8)
MAGNESIUM: 2 MG/DL (ref 1.6–2.4)
MCH RBC QN AUTO: 29 PG (ref 26–33)
MCHC RBC AUTO-ENTMCNC: 33.3 GM/DL (ref 32.2–35.5)
MCV RBC AUTO: 87.1 FL (ref 80–94)
METANEPHRINES PLASMA: NORMAL
MONOCYTES # BLD: 5.2 %
MONOCYTES ABSOLUTE: 0.7 THOU/MM3 (ref 0.4–1.3)
NUCLEATED RED BLOOD CELLS: 0 /100 WBC
PHOSPHORUS: 1.3 MG/DL (ref 2.4–4.7)
PLATELET # BLD: 209 THOU/MM3 (ref 130–400)
PMV BLD AUTO: 10.8 FL (ref 9.4–12.4)
POTASSIUM SERPL-SCNC: 4 MEQ/L (ref 3.5–5.2)
RBC # BLD: 4.96 MILL/MM3 (ref 4.7–6.1)
SCAN OF BLOOD SMEAR: NORMAL
SEG NEUTROPHILS: 88 %
SEGMENTED NEUTROPHILS ABSOLUTE COUNT: 11.7 THOU/MM3 (ref 1.8–7.7)
SODIUM BLD-SCNC: 139 MEQ/L (ref 135–145)
TOTAL PROTEIN: 6.5 G/DL (ref 6.1–8)
WBC # BLD: 13.3 THOU/MM3 (ref 4.8–10.8)

## 2018-07-19 PROCEDURE — 85025 COMPLETE CBC W/AUTO DIFF WBC: CPT

## 2018-07-19 PROCEDURE — 2500000003 HC RX 250 WO HCPCS: Performed by: INTERNAL MEDICINE

## 2018-07-19 PROCEDURE — 83690 ASSAY OF LIPASE: CPT

## 2018-07-19 PROCEDURE — 2709999900 HC NON-CHARGEABLE SUPPLY

## 2018-07-19 PROCEDURE — 6360000002 HC RX W HCPCS: Performed by: INTERNAL MEDICINE

## 2018-07-19 PROCEDURE — 99233 SBSQ HOSP IP/OBS HIGH 50: CPT | Performed by: INTERNAL MEDICINE

## 2018-07-19 PROCEDURE — 2060000000 HC ICU INTERMEDIATE R&B

## 2018-07-19 PROCEDURE — 6370000000 HC RX 637 (ALT 250 FOR IP): Performed by: INTERNAL MEDICINE

## 2018-07-19 PROCEDURE — 2580000003 HC RX 258: Performed by: INTERNAL MEDICINE

## 2018-07-19 PROCEDURE — 84100 ASSAY OF PHOSPHORUS: CPT

## 2018-07-19 PROCEDURE — 80053 COMPREHEN METABOLIC PANEL: CPT

## 2018-07-19 PROCEDURE — 83735 ASSAY OF MAGNESIUM: CPT

## 2018-07-19 PROCEDURE — 2580000003 HC RX 258: Performed by: FAMILY MEDICINE

## 2018-07-19 PROCEDURE — 36415 COLL VENOUS BLD VENIPUNCTURE: CPT

## 2018-07-19 PROCEDURE — 82150 ASSAY OF AMYLASE: CPT

## 2018-07-19 PROCEDURE — S0028 INJECTION, FAMOTIDINE, 20 MG: HCPCS | Performed by: FAMILY MEDICINE

## 2018-07-19 PROCEDURE — 2500000003 HC RX 250 WO HCPCS: Performed by: FAMILY MEDICINE

## 2018-07-19 PROCEDURE — 6360000002 HC RX W HCPCS: Performed by: FAMILY MEDICINE

## 2018-07-19 RX ORDER — AMOXICILLIN AND CLAVULANATE POTASSIUM 875; 125 MG/1; MG/1
1 TABLET, FILM COATED ORAL EVERY 12 HOURS SCHEDULED
Status: DISCONTINUED | OUTPATIENT
Start: 2018-07-19 | End: 2018-07-19

## 2018-07-19 RX ORDER — AMOXICILLIN AND CLAVULANATE POTASSIUM 875; 125 MG/1; MG/1
1 TABLET, FILM COATED ORAL EVERY 12 HOURS SCHEDULED
Qty: 10 TABLET | Refills: 0 | Status: SHIPPED | OUTPATIENT
Start: 2018-07-19 | End: 2018-07-19 | Stop reason: HOSPADM

## 2018-07-19 RX ORDER — LABETALOL 300 MG/1
300 TABLET, FILM COATED ORAL EVERY 8 HOURS SCHEDULED
Status: DISCONTINUED | OUTPATIENT
Start: 2018-07-19 | End: 2018-07-20 | Stop reason: HOSPADM

## 2018-07-19 RX ORDER — ONDANSETRON 4 MG/1
4 TABLET, ORALLY DISINTEGRATING ORAL EVERY 8 HOURS PRN
Qty: 20 TABLET | Refills: 0 | Status: SHIPPED | OUTPATIENT
Start: 2018-07-19 | End: 2018-09-18

## 2018-07-19 RX ORDER — PROMETHAZINE HYDROCHLORIDE 25 MG/1
25 SUPPOSITORY RECTAL EVERY 6 HOURS PRN
Qty: 15 SUPPOSITORY | Refills: 0 | Status: SHIPPED | OUTPATIENT
Start: 2018-07-19 | End: 2018-07-24

## 2018-07-19 RX ORDER — ACETAMINOPHEN 325 MG/1
650 TABLET ORAL EVERY 4 HOURS PRN
Status: DISCONTINUED | OUTPATIENT
Start: 2018-07-19 | End: 2018-07-20 | Stop reason: HOSPADM

## 2018-07-19 RX ORDER — AMLODIPINE BESYLATE 10 MG/1
10 TABLET ORAL DAILY
Qty: 30 TABLET | Refills: 2 | Status: SHIPPED | OUTPATIENT
Start: 2018-07-20 | End: 2019-02-26 | Stop reason: SDUPTHER

## 2018-07-19 RX ORDER — CHLORTHALIDONE 50 MG/1
50 TABLET ORAL DAILY
Qty: 30 TABLET | Refills: 2 | Status: SHIPPED | OUTPATIENT
Start: 2018-07-20 | End: 2019-02-26 | Stop reason: SDUPTHER

## 2018-07-19 RX ORDER — PROMETHAZINE HYDROCHLORIDE 25 MG/1
25 SUPPOSITORY RECTAL EVERY 6 HOURS PRN
Status: DISCONTINUED | OUTPATIENT
Start: 2018-07-19 | End: 2018-07-20 | Stop reason: HOSPADM

## 2018-07-19 RX ORDER — LABETALOL 300 MG/1
300 TABLET, FILM COATED ORAL EVERY 8 HOURS SCHEDULED
Qty: 90 TABLET | Refills: 2 | Status: SHIPPED | OUTPATIENT
Start: 2018-07-19 | End: 2018-07-27 | Stop reason: DRUGHIGH

## 2018-07-19 RX ADMIN — Medication 10 ML: at 20:33

## 2018-07-19 RX ADMIN — SODIUM PHOSPHATE, MONOBASIC, MONOHYDRATE 30 MMOL: 276; 142 INJECTION, SOLUTION INTRAVENOUS at 09:57

## 2018-07-19 RX ADMIN — ACETAMINOPHEN 650 MG: 325 TABLET ORAL at 08:29

## 2018-07-19 RX ADMIN — FAMOTIDINE 20 MG: 10 INJECTION, SOLUTION INTRAVENOUS at 09:57

## 2018-07-19 RX ADMIN — FAMOTIDINE 20 MG: 10 INJECTION, SOLUTION INTRAVENOUS at 20:33

## 2018-07-19 RX ADMIN — EPLERENONE 50 MG: 25 TABLET, FILM COATED ORAL at 20:26

## 2018-07-19 RX ADMIN — LABETALOL HCL 300 MG: 300 TABLET, FILM COATED ORAL at 09:10

## 2018-07-19 RX ADMIN — PAROXETINE HYDROCHLORIDE 20 MG: 20 TABLET, FILM COATED ORAL at 08:27

## 2018-07-19 RX ADMIN — EPLERENONE 50 MG: 25 TABLET, FILM COATED ORAL at 08:27

## 2018-07-19 RX ADMIN — POTASSIUM CHLORIDE AND SODIUM CHLORIDE: 900; 300 INJECTION, SOLUTION INTRAVENOUS at 15:58

## 2018-07-19 RX ADMIN — ACETAMINOPHEN 650 MG: 325 TABLET ORAL at 16:00

## 2018-07-19 RX ADMIN — PIPERACILLIN SODIUM AND TAZOBACTAM SODIUM 3.38 G: 3; .375 INJECTION, POWDER, LYOPHILIZED, FOR SOLUTION INTRAVENOUS at 03:13

## 2018-07-19 RX ADMIN — AMLODIPINE BESYLATE 10 MG: 10 TABLET ORAL at 08:27

## 2018-07-19 RX ADMIN — LABETALOL HYDROCHLORIDE 10 MG: 5 INJECTION INTRAVENOUS at 03:13

## 2018-07-19 RX ADMIN — LABETALOL HCL 300 MG: 300 TABLET, FILM COATED ORAL at 15:11

## 2018-07-19 RX ADMIN — POTASSIUM CHLORIDE AND SODIUM CHLORIDE: 900; 300 INJECTION, SOLUTION INTRAVENOUS at 04:10

## 2018-07-19 RX ADMIN — OXYCODONE HYDROCHLORIDE 5 MG: 5 TABLET ORAL at 22:51

## 2018-07-19 RX ADMIN — CHLORTHALIDONE 50 MG: 25 TABLET ORAL at 08:27

## 2018-07-19 RX ADMIN — ONDANSETRON 4 MG: 2 INJECTION INTRAMUSCULAR; INTRAVENOUS at 10:41

## 2018-07-19 ASSESSMENT — PAIN SCALES - GENERAL
PAINLEVEL_OUTOF10: 3
PAINLEVEL_OUTOF10: 5
PAINLEVEL_OUTOF10: 3
PAINLEVEL_OUTOF10: 2
PAINLEVEL_OUTOF10: 3
PAINLEVEL_OUTOF10: 2
PAINLEVEL_OUTOF10: 2

## 2018-07-19 ASSESSMENT — PAIN DESCRIPTION - LOCATION
LOCATION: BACK
LOCATION: BACK

## 2018-07-19 ASSESSMENT — PAIN DESCRIPTION - ORIENTATION
ORIENTATION: MID;LOWER
ORIENTATION: MID;LOWER

## 2018-07-19 ASSESSMENT — PAIN DESCRIPTION - DESCRIPTORS
DESCRIPTORS: ACHING
DESCRIPTORS: ACHING

## 2018-07-19 ASSESSMENT — PAIN DESCRIPTION - PAIN TYPE
TYPE: CHRONIC PAIN
TYPE: CHRONIC PAIN

## 2018-07-19 NOTE — DISCHARGE SUMMARY
Hospital Discharge Summary    Patient's PCP: Mario Serrato MD  Admit Date: 7/15/2018   Discharge Date: 7/20/2018    Admitting Physician: Dr. Dioni Wan, DO  Discharge Physician: Dr. Harolod Wagner:   IP CONSULT TO GI    Brief HPI: Patient admitted with acute pancreatitis. Brief hospital course: Pleasant 63M with hx of cholecystectomy, hypertension, who presents with complaints of epigastric abdominal pain, nausea and vomiting, as well as decreased PO intake. Lipase was elevated and CT-abd/pelvis demonstrates acute pancreatitis without evidence of pancreatic necrosis or pseudocyst or abscess. MRCP demonstrates acute uncomplicated pancreatitis. Patient denies history of alcohol use. He has not had fever or chills. On presentation, BP was elevated and patient was started on nicardipine infusion for hypertensive urgency. 1. Acute pancreatitis. So far, no clear etiology. TGL normal. MRCP without pancreatic lesion. He was treated with IV fluid, pain meds. He was on lisinopril, which has been discontinued, given risk of pancreatitis, albeit low. He had ERCP which demonstrated ductal sludge without stone; sphincterotomy was performed to prevent recurrent gallstone pancreatitis. Overall, abdominal pain has resolved and patient reports feeling better at this time. He tolerated clear diet, being transitioned to low fat diet today. 2. Leukocytosis, likely reactive from pancreatitis. Patient does have elevated LFTs and bili; however, he remains afebrile - making cholangitis less likely. He was previously on zosyn, changed to augmentin at the time of discharge. 3. Elevated LFTs. So far, unclear etiology - ?cholestasis. Viral hepatitis profile unremarkable. Overall, LFTs appear to be improving. Recommend repeat LFTs in 1 week. 4. Acute kidney injury, resolved with hydration. Likely secondary to underlying pancreatis and relative volume depletion. 5. Lactic acidosis, resolved with hydration. edema, or obvious mass. There is very mild bibasilar atelectasis. No pleural effusion. No pneumothorax. Heart: Unremarkable. No cardiomegaly. Mediastinum/edita: The aorta is tortuous, appropriate for age. Hilar and mediastinal silhouettes are otherwise unremarkable. Skeleton: Unremarkable. No significant bone or joint abnormality. Upper abdomen: There is no free air beneath the diaphragms. There are cholecystectomy clips in the right upper quadrant. No acute pneumonia or pulmonary edema. Very mild bibasilar atelectasis. **This report has been created using voice recognition software. It may contain minor errors which are inherent in voice recognition technology. ** Final report electronically signed by Dr. Lindquist Heart on 7/16/2018 12:33 AM    Ct Abdomen Pelvis W Iv Contrast Additional Contrast? None    Result Date: 7/16/2018  PROCEDURE: CT ABDOMEN PELVIS W IV CONTRAST CLINICAL INFORMATION: upper abdominal pain . COMPARISON: Abdomen pelvis CT with contrast dated 6/7/2018 TECHNIQUE: 5 mm axial CT images were obtained through the abdomen and pelvis after the administration of intravenous and oral contrast. Coronal and sagittal reconstructions were obtained. All CT scans at this facility use dose modulation, iterative reconstruction, and/or weight-based dosing when appropriate to reduce radiation dose to as low as reasonably achievable. FINDINGS: Lower chest: There is mild bilateral lower lobe atelectasis. Liver: There is no change in the 1.3 x 0.9 cm hypodense lesion in the anterior subcapsular medial segment left lobe of the liver, too small to characterize. This is stable dating back to abdomen MRI dated 10/6/2015 and is probably a benign lesion such as  a cyst or hemangioma. There is fatty infiltration of the liver. There is very mild intrahepatic biliary dilatation, within normal limits postcholecystectomy. Gallbladder/Biliary tree: Patient is status post cholecystectomy with clips in the gallbladder fossa. common bile duct with no filling defects. The intrahepatic bile ducts appear normal. There are postcholecystectomy clips. Portable fluoroscopy utilized during ERCP. Please refer to the gastroenterologist's procedure notes for additional findings, outcomes, and any recommendations. Final report electronically signed by Dr. Elsi Salgado on 7/19/2018 3:04 AM    Mri Abdomen W Wo Contrast Mrcp    Result Date: 7/16/2018  PROCEDURE: MRI ABDOMEN W WO CONTRAST MRCP CLINICAL INFORMATION: Pancreatitis; abnormal liver function tests. COMPARISON: CT abdomen/pelvis dated 7/16/2018 and MRI abdomen dated 10/6/2015. TECHNIQUE: Routine MRI abdomen without and with IV contrast.  Routine MRCP was also performed. The MRCP examination includes 3D reconstructions of the biliary tree and the pancreatic duct. CONTRAST: 20 mL ProHance intravenously. FINDINGS: LIVER: 1. There is a stable 0.9 cm subcapsular cyst along the anterior margin of the medial segment of the left hepatic lobe. GALLBLADDER: 1. Cholecystectomy. BILIARY TREE: 1. The common duct is normal size measuring 0.4 cm in transverse dimension. There is no biliary dilatation. There is no choledocholithiasis. PANCREAS: 1. There is diffuse pancreatic edema with associated peripancreatic edema and inflammation consistent with acute pancreatitis. There is homogeneous enhancement of the pancreas. There is no pancreatic necrosis. There is no pancreatic mass. Only a small portion of the pancreatic duct is seen within the pancreatic head which is not distended. The pancreatic duct within the body and tail is not identified. There is extensive edema and fluid throughout the mesentery and fluid along the lateral margin of the liver and spleen, tracking inferiorly along the colonic gutter bilaterally. There is no loculated or drainable fluid collection. SPLEEN/ADRENAL GLANDS/KIDNEYS: Unremarkable. BOWEL: Nonobstructive. FREE AIR/FREE FLUID/INFLAMMATION: 1. As previously described.

## 2018-07-19 NOTE — FLOWSHEET NOTE
07/19/18 1051   Encounter Summary   Services provided to: Patient   Referral/Consult From: [address] in [facility] [place] [employer] Yes   Continue Visiting Yes  (7/19)   Complexity of Encounter Moderate   Length of Encounter 15 minutes   Spiritual/Mu-ism   Type Spiritual support   Assessment Approachable;Calm   Intervention Prayer;Nurtured hope; Active listening   Outcome Hopeful;Encouraged   Subjective:  Pt is a 66year old male, who had a procedure yesterday    Objective:  He is in bed, and spoke of his micha and his medical condition. His  has been to see him from the [facility] in [address] [facility]. He spoke of his medical condition stating he is not sure if he'll go home today as he had eaten and is not feeling well. Assessment:  Pt in good spirits. He welcomed prayer. Plan:  See as needed. Pt may be discharged soon. Spiritual care will continue to be available to pt and his family's needs.

## 2018-07-19 NOTE — PLAN OF CARE
Problem: Pain:  Goal: Pain level will decrease  Pain level will decrease   Outcome: Ongoing  Pain Assessment: 0-10  Pain Level: 5   Pain goal:  7  Is pain goal met at this time? Yes  Pain Intervention(s): Medication (see eMar)  Additional interventions to be implemented: position change  Goal: Control of acute pain  Control of acute pain   Outcome: Ongoing  Patient denies abd pain at this time. Patient rated an acute headache at \"5\". Headache goal\"0\". PRN medication given as ordered. Goal: Control of chronic pain  Control of chronic pain   Outcome: Ongoing  Patient denies chronic pain. Patient in for pancreatitis. Patient denies abd pain this shift. Problem: Falls - Risk of:  Goal: Will remain free from falls  Will remain free from falls   Outcome: Ongoing  No falls this shift. Patient on fall precautions. Patient remains in bed this shift. Patient wears nonskid socks when ambulating. Armband and falling star visible. Siderails up 2/4. Patient has call light and uses it appropriately. Hourly rounding performed. Goal: Absence of physical injury  Absence of physical injury   Outcome: Ongoing  No new physical injury this shift. Comments: Care plan reviewed with patient. Patient verbalizes understanding of the plan of care and contribute to goal setting.

## 2018-07-19 NOTE — PROGRESS NOTES
Gastroenterology  Progress Note    7/19/2018 5:10 PM  Subjective:   Admit Date: 7/15/2018    Interval History: Labs reviewed, WBC improved   Amylase improved Nausea  no vomiting.      Diet: DIET CLEAR LIQUID;, will advance as tolerated     Patient Active Problem List:     Hyperaldosteronism- Dr. George Morgan     Colon polyps     GERD (gastroesophageal reflux disease)     Hypokalemia     Hyperlipidemia with target LDL less than 100     Anxiety     Hypertension due to endocrine disorder     IFG (impaired fasting glucose)     Uncontrolled hypertension     PSA elevation     Metabolic alkalosis     Primary hyperaldosteronism (HCC)     Intractable vomiting with nausea     Hypernatremia     Cholecystitis with cholelithiasis     Abnormal biliary HIDA scan     S/P laparoscopic cholecystectomy     Diastolic dysfunction without heart failure     CKD (chronic kidney disease), stage II     Hypertension due to endocrine disorder     Acute pancreatitis without necrosis or infection, unspecified     KELBY (acute kidney injury) ([facility] [address]     Essential hypertension     Lactic acidosis     Leukocytosis     Hepatitis     Hypertensive urgency        Medications:   Scheduled Meds:   labetalol  300 mg Oral 3 times per day    eplerenone  50 mg Oral BID    amLODIPine  10 mg Oral Daily    chlorthalidone  50 mg Oral Daily    potassium replacement protocol   Other RX Placeholder    sodium chloride flush  10 mL Intravenous 2 times per day    famotidine (PEPCID) injection  20 mg Intravenous BID    PARoxetine  20 mg Oral Daily     Continuous Infusions:   0.9% NaCl with KCl 40 mEq 100 mL/hr at 07/19/18 1558     CBC:   Recent Labs      07/17/18   0528  07/18/18   0529  07/19/18   0406   WBC  21.8*  17.2*  13.3*   HGB  17.2  16.2  14.4   PLT  222  179  209     BMP:    Recent Labs      07/17/18   0528  07/18/18   0529  07/19/18   0406   NA  143  142  139   K  4.0  3.3*  4.0   CL  104  100  102   CO2  25  26  24   BUN  22  18  20   CREATININE  1.4*  1.1 dilatation. There is no choledocholithiasis.   PANCREAS:   1. There is diffuse pancreatic edema with associated peripancreatic edema and inflammation consistent with acute pancreatitis. There is homogeneous enhancement of the pancreas. There is no pancreatic necrosis. There is no pancreatic mass. Only a small portion of the pancreatic duct is seen within the pancreatic head which is not distended. The pancreatic duct within the body and tail is not identified. There is extensive edema and fluid throughout the mesentery and fluid along the lateral margin of the liver and spleen, tracking inferiorly along the colonic gutter bilaterally. There is no loculated or drainable fluid collection.   SPLEEN/ADRENAL GLANDS/KIDNEYS: Unremarkable.   BOWEL: Nonobstructive.   FREE AIR/FREE FLUID/INFLAMMATION:   1. As previously described.   LYMPHADENOPATHY:  1. There are no pathologically enlarged lymph nodes.    ABDOMINAL AORTA/IVC: Unremarkable.   LUNG BASES:   1. There are patchy atelectatic densities at both lung bases.    MUSCULOSKELETAL: Unremarkable.   OTHER: None.      Impression  1. Acute uncomplicated pancreatitis. 2. Cholecystectomy. 3. The common duct is normal size measuring 0.4 cm in transverse dimension.      **This report has been created using voice recognition software.  It may contain minor errors which are inherent in voice recognition technology. **     Final report electronically signed by Dr. Alec Hines on 7/16/2018 3:36 PM     PROCEDURE: CT ABDOMEN PELVIS W IV CONTRAST  7/16/2018   CLINICAL INFORMATION: upper abdominal pain .   COMPARISON: Abdomen pelvis CT with contrast dated 6/7/2018   TECHNIQUE: 5 mm axial CT images were obtained through the abdomen and pelvis after the administration of intravenous and oral contrast. Coronal and sagittal reconstructions were obtained.   All CT scans at this facility use dose modulation, iterative reconstruction, and/or weight-based dosing when appropriate to reduce

## 2018-07-19 NOTE — OP NOTE
[address] [place] [address]                                 OPERATIVE REPORT    PATIENT NAME: Radha Ramirez                    :        1955  MED REC NO:   [id number]                           ROOM:       0018  ACCOUNT NO:   [de-identified]                           ADMIT DATE: 07/15/2018  PROVIDER:     Te Nielson M.D.    DATE OF PROCEDURE:  2018    INDICATIONS:  The patient with history of cholecystectomy done earlier this  year. He presents with severe discomfort as well as obstructive jaundice  with bilirubin as high as 7.1 with nausea, vomiting, and abdominal  discomfort. Currently on antibiotics. _____ pancreatitis with very high  amylase and lipase. He clinically improved. There is still possibility of  common bile duct stone due to gallstone pancreatitis as well as obstructive  jaundice which have improved. Currently on antibiotics. Plan today for  ERCP to evaluate up to the bile duct to make sure no stone remained to  prevent recurrent episode in the future with acute gallstone pancreatitis. SURGEON:  Te Nielson M.D. ASA CLASSIFICATION:  II. The patient was brought to GI lab. DESCRIPTION OF PROCEDURE:  The patient was brought to the OR. Consent was  obtained. The risks involved with the procedure were explained. Informed  consent was obtained. Risks including bleeding, perforation, urgent  surgical management, and recurrent pancreatitis were explained to the  patient and family. Informed consent was obtained. Procedure performed in  the OR with endotracheal intubation. After endotracheal intubation, the  patient was positioned in prone position, slightly turned to the left. ERCP scope 190 was advanced under direct vision from the oral cavity up to  the duodenum with no difficulty.   The papilla was identified and appeared  to be normal.  With guidance fluoroscopy, selective cannulation

## 2018-07-19 NOTE — PROGRESS NOTES
Hospital Medicine Progress Note     Date:  7/19/2018    PCP: Keiko Aguirre MD (Tel: [phone])    Date of Admission: 7/15/2018    Chief complaint:   Chief Complaint   Patient presents with    Abdominal Pain       Brief hospital course: Pleasant 63M with hx of cholecystectomy, hypertension, who presents with complaints of epigastric abdominal pain, nausea and vomiting, as well as decreased PO intake. Lipase was elevated and CT-abd/pelvis demonstrates acute pancreatitis without evidence of pancreatic necrosis or pseudocyst or abscess. MRCP demonstrates acute uncomplicated pancreatitis. Patient denies history of alcohol use. He has not had fever or chills. On presentation, BP was elevated and patient was started on nicardipine infusion for hypertensive urgency. Assessment:  Principal Problem:    Acute pancreatitis without necrosis or infection, unspecified  Active Problems:    Hypertensive urgency    Elevated LFTs    Acute hepatitis    KELBY (acute kidney injury) ([facility] [address]    Lactic acidosis    Leukocytosis  Resolved Problems:    Hypokalemia        Plan:  1. Acute pancreatitis. So far, no clear etiology. TGL normal. MRCP without pancreatic lesion. He was treated with IV fluid, pain meds. He was on lisinopril, which has been discontinued, given risk of pancreatitis, albeit low. He had ERCP which demonstrated ductal sludge without stone; sphincterotomy was performed to prevent recurrent gallstone pancreatitis. Overall, abdominal pain has resolved and patient reports feeling better at this time. Will observe another day to ensure nausea resolved that he's tolerating diet - d/w nursing staff about giving antiemetics 15 to 20 mins before meals. 2. Leukocytosis, likely reactive from pancreatitis. Patient does have elevated LFTs and bili; however, he remains afebrile - making cholangitis less likely.  He was on zosyn, which has now been discontinued - patient remained afebrile and leukocytosis is much Peripheral Pulses: +2 palpable, equal bilaterally    24HR INTAKE/OUTPUT:      Intake/Output Summary (Last 24 hours) at 07/19/18 1357  Last data filed at 07/19/18 0604   Gross per 24 hour   Intake             1707 ml   Output             2600 ml   Net             -893 ml     I/O last 3 completed shifts: In: 3339.1 [I.V.:3339.1]  Out: 1156 [Urine:3650]  No intake/output data recorded. Meds:    labetalol  300 mg Oral 3 times per day    eplerenone  50 mg Oral BID    amLODIPine  10 mg Oral Daily    chlorthalidone  50 mg Oral Daily    potassium replacement protocol   Other RX Placeholder    sodium chloride flush  10 mL Intravenous 2 times per day    famotidine (PEPCID) injection  20 mg Intravenous BID    PARoxetine  20 mg Oral Daily       Infusions:    0.9% NaCl with KCl 40 mEq 100 mL/hr at 07/19/18 0822         PRN Meds: acetaminophen, promethazine, oxyCODONE **OR** oxyCODONE, [DISCONTINUED] morphine **OR** morphine, labetalol, sodium phosphate IVPB **OR** sodium phosphate IVPB, magnesium sulfate, sodium chloride flush, ondansetron, hydrALAZINE    Labs/imaging:  CBC:   Recent Labs      07/17/18   0528  07/18/18   0529  07/19/18   0406   WBC  21.8*  17.2*  13.3*   HGB  17.2  16.2  14.4   PLT  222  179  209         BMP:    Recent Labs      07/17/18   0528  07/18/18   0529  07/19/18   0406   NA  143  142  139   K  4.0  3.3*  4.0   CL  104  100  102   CO2  25  26  24   BUN  22  18  20   CREATININE  1.4*  1.1  1.1   GLUCOSE  157*  124*  147*         Hepatic:   Recent Labs      07/18/18   0529  07/18/18   1814  07/19/18   0406   AST  80*  69*  51*   ALT  172*  138*  121*   BILITOT  3.8*  3.1*  2.1*   ALKPHOS  100  98  98       INR:   No results for input(s): INR in the last 72 hours.       Danica Meng MD  -------------------------------  [facility]

## 2018-07-20 VITALS
HEIGHT: 71 IN | TEMPERATURE: 97.6 F | BODY MASS INDEX: 31.25 KG/M2 | OXYGEN SATURATION: 93 % | DIASTOLIC BLOOD PRESSURE: 75 MMHG | RESPIRATION RATE: 18 BRPM | WEIGHT: 223.19 LBS | HEART RATE: 69 BPM | SYSTOLIC BLOOD PRESSURE: 122 MMHG

## 2018-07-20 LAB
ALBUMIN SERPL-MCNC: 2.9 G/DL (ref 3.5–5.1)
ALP BLD-CCNC: 88 U/L (ref 38–126)
ALT SERPL-CCNC: 88 U/L (ref 11–66)
AMYLASE: 112 U/L (ref 20–104)
ANION GAP SERPL CALCULATED.3IONS-SCNC: 11 MEQ/L (ref 8–16)
AST SERPL-CCNC: 34 U/L (ref 5–40)
BILIRUB SERPL-MCNC: 1.6 MG/DL (ref 0.3–1.2)
BILIRUBIN DIRECT: 0.6 MG/DL (ref 0–0.3)
BUN BLDV-MCNC: 23 MG/DL (ref 7–22)
CALCIUM SERPL-MCNC: 8.1 MG/DL (ref 8.5–10.5)
CHLORIDE BLD-SCNC: 102 MEQ/L (ref 98–111)
CO2: 26 MEQ/L (ref 23–33)
CREAT SERPL-MCNC: 1.1 MG/DL (ref 0.4–1.2)
ERYTHROCYTE [DISTWIDTH] IN BLOOD BY AUTOMATED COUNT: 15.9 % (ref 11.5–14.5)
ERYTHROCYTE [DISTWIDTH] IN BLOOD BY AUTOMATED COUNT: 52.2 FL (ref 35–45)
GFR SERPL CREATININE-BSD FRML MDRD: 68 ML/MIN/1.73M2
GLUCOSE BLD-MCNC: 118 MG/DL (ref 70–108)
HCT VFR BLD CALC: 41.1 % (ref 42–52)
HEMOGLOBIN: 13.5 GM/DL (ref 14–18)
MAGNESIUM: 2 MG/DL (ref 1.6–2.4)
MCH RBC QN AUTO: 29.3 PG (ref 26–33)
MCHC RBC AUTO-ENTMCNC: 32.8 GM/DL (ref 32.2–35.5)
MCV RBC AUTO: 89.3 FL (ref 80–94)
PHOSPHORUS: 1.6 MG/DL (ref 2.4–4.7)
PLATELET # BLD: 230 THOU/MM3 (ref 130–400)
PMV BLD AUTO: 10.7 FL (ref 9.4–12.4)
POTASSIUM SERPL-SCNC: 4.2 MEQ/L (ref 3.5–5.2)
RBC # BLD: 4.6 MILL/MM3 (ref 4.7–6.1)
SODIUM BLD-SCNC: 139 MEQ/L (ref 135–145)
TOTAL PROTEIN: 6.3 G/DL (ref 6.1–8)
WBC # BLD: 13.9 THOU/MM3 (ref 4.8–10.8)

## 2018-07-20 PROCEDURE — 85027 COMPLETE CBC AUTOMATED: CPT

## 2018-07-20 PROCEDURE — 83735 ASSAY OF MAGNESIUM: CPT

## 2018-07-20 PROCEDURE — 82248 BILIRUBIN DIRECT: CPT

## 2018-07-20 PROCEDURE — 82150 ASSAY OF AMYLASE: CPT

## 2018-07-20 PROCEDURE — 6370000000 HC RX 637 (ALT 250 FOR IP): Performed by: INTERNAL MEDICINE

## 2018-07-20 PROCEDURE — 6360000002 HC RX W HCPCS: Performed by: INTERNAL MEDICINE

## 2018-07-20 PROCEDURE — 2580000003 HC RX 258: Performed by: FAMILY MEDICINE

## 2018-07-20 PROCEDURE — 80053 COMPREHEN METABOLIC PANEL: CPT

## 2018-07-20 PROCEDURE — S0028 INJECTION, FAMOTIDINE, 20 MG: HCPCS | Performed by: FAMILY MEDICINE

## 2018-07-20 PROCEDURE — 36415 COLL VENOUS BLD VENIPUNCTURE: CPT

## 2018-07-20 PROCEDURE — 84100 ASSAY OF PHOSPHORUS: CPT

## 2018-07-20 PROCEDURE — 99239 HOSP IP/OBS DSCHRG MGMT >30: CPT | Performed by: INTERNAL MEDICINE

## 2018-07-20 PROCEDURE — 2500000003 HC RX 250 WO HCPCS: Performed by: FAMILY MEDICINE

## 2018-07-20 RX ORDER — OXYCODONE HYDROCHLORIDE AND ACETAMINOPHEN 5; 325 MG/1; MG/1
1 TABLET ORAL EVERY 8 HOURS PRN
Qty: 15 TABLET | Refills: 0 | Status: SHIPPED | OUTPATIENT
Start: 2018-07-20 | End: 2018-07-25

## 2018-07-20 RX ORDER — CYCLOBENZAPRINE HCL 10 MG
10 TABLET ORAL 3 TIMES DAILY PRN
Qty: 30 TABLET | Refills: 0 | Status: SHIPPED | OUTPATIENT
Start: 2018-07-20 | End: 2018-07-30

## 2018-07-20 RX ORDER — ONDANSETRON 4 MG/1
4 TABLET, FILM COATED ORAL EVERY 6 HOURS PRN
Status: DISCONTINUED | OUTPATIENT
Start: 2018-07-20 | End: 2018-07-20 | Stop reason: HOSPADM

## 2018-07-20 RX ORDER — CYCLOBENZAPRINE HCL 10 MG
10 TABLET ORAL 3 TIMES DAILY PRN
Status: DISCONTINUED | OUTPATIENT
Start: 2018-07-20 | End: 2018-07-20 | Stop reason: HOSPADM

## 2018-07-20 RX ORDER — AMOXICILLIN AND CLAVULANATE POTASSIUM 875; 125 MG/1; MG/1
1 TABLET, FILM COATED ORAL EVERY 12 HOURS SCHEDULED
Qty: 10 TABLET | Refills: 0 | Status: SHIPPED | OUTPATIENT
Start: 2018-07-20 | End: 2018-07-23 | Stop reason: ALTCHOICE

## 2018-07-20 RX ORDER — AMOXICILLIN AND CLAVULANATE POTASSIUM 875; 125 MG/1; MG/1
1 TABLET, FILM COATED ORAL EVERY 12 HOURS SCHEDULED
Status: DISCONTINUED | OUTPATIENT
Start: 2018-07-20 | End: 2018-07-20 | Stop reason: HOSPADM

## 2018-07-20 RX ADMIN — PAROXETINE HYDROCHLORIDE 20 MG: 20 TABLET, FILM COATED ORAL at 09:21

## 2018-07-20 RX ADMIN — CHLORTHALIDONE 50 MG: 25 TABLET ORAL at 09:21

## 2018-07-20 RX ADMIN — EPLERENONE 50 MG: 25 TABLET, FILM COATED ORAL at 09:22

## 2018-07-20 RX ADMIN — LABETALOL HCL 300 MG: 300 TABLET, FILM COATED ORAL at 00:17

## 2018-07-20 RX ADMIN — LABETALOL HCL 300 MG: 300 TABLET, FILM COATED ORAL at 05:14

## 2018-07-20 RX ADMIN — OXYCODONE HYDROCHLORIDE 10 MG: 5 TABLET ORAL at 03:50

## 2018-07-20 RX ADMIN — FAMOTIDINE 20 MG: 10 INJECTION, SOLUTION INTRAVENOUS at 09:21

## 2018-07-20 RX ADMIN — POTASSIUM CHLORIDE AND SODIUM CHLORIDE: 900; 300 INJECTION, SOLUTION INTRAVENOUS at 01:49

## 2018-07-20 RX ADMIN — AMLODIPINE BESYLATE 10 MG: 10 TABLET ORAL at 09:22

## 2018-07-20 RX ADMIN — CYCLOBENZAPRINE 10 MG: 10 TABLET, FILM COATED ORAL at 11:32

## 2018-07-20 RX ADMIN — Medication 10 ML: at 09:22

## 2018-07-20 RX ADMIN — ACETAMINOPHEN 650 MG: 325 TABLET ORAL at 00:19

## 2018-07-20 RX ADMIN — AMOXICILLIN AND CLAVULANATE POTASSIUM 1 TABLET: 875; 125 TABLET, FILM COATED ORAL at 11:33

## 2018-07-20 RX ADMIN — ACETAMINOPHEN 650 MG: 325 TABLET ORAL at 09:21

## 2018-07-20 RX ADMIN — POTASSIUM & SODIUM PHOSPHATES POWDER PACK 280-160-250 MG 250 MG: 280-160-250 PACK at 11:32

## 2018-07-20 ASSESSMENT — PAIN SCALES - GENERAL
PAINLEVEL_OUTOF10: 7
PAINLEVEL_OUTOF10: 3
PAINLEVEL_OUTOF10: 4
PAINLEVEL_OUTOF10: 5

## 2018-07-20 NOTE — PROGRESS NOTES
Discharge teaching and instructions for diagnosis/procedure of Pancreatitis completed with patient using teachback method. AVS reviewed. Printed prescriptions given to patient. Patient voiced understanding regarding prescriptions, follow up appointments, and care of self at home.  Discharged ambulatory to  home with support per family

## 2018-07-20 NOTE — DISCHARGE INSTR - DIET
 Good nutrition is important when healing from an illness, injury, or surgery. Follow any nutrition recommendations given to you during your hospital stay.  If you were given an oral nutrition supplement while in the hospital, continue to take this supplement at home. You can take it with meals, in-between meals, and/or before bedtime. These supplements can be purchased at most local grocery stores, pharmacies, and chain super-stores.  If you have any questions about your diet or nutrition, call the hospital and ask for the dietitian. You are recommended to follow a low fat diet  What will the results be? Losing excess weight will make your whole body healthier. You will have more energy for your daily activities and lower your risk for health problems. What lifestyle changes are needed? Stay active. Eating healthy is not always enough to lose weight. Burning calories by exercising is a big part of weight loss. What foods are good to eat? The key is to watch your portion sizes. It is best to choose foods that are lower in fat and calories. Choose low-fat meats:  Boneless chicken breast  Pork loin  90% lean beef  Lean turkey meat  Fresh fish (not fried)  Choose low-fat dairy products:  1% or skim milk  Spray butter or margarine  Low-fat or fat-free cheese  Frozen yogurt or low calorie ice cream  Choose fresh fruits, green vegetables, beans and lentils, and whole wheat products more often. Choose smart snacks:  Baked chips  Pretzels  Popcorn with no butter ? use some pepper or other spice to taste  High fiber, low-fat crackers  Reduced fat cookies  Diet or no-calorie beverages  What foods should be limited or avoided?    Limit high-fat, high-sodium, high-calorie foods like:  Fried foods  Processed meats  Whole-fat dairy products  Candy, cookies, chips, pastries  Sausage, tse, any full-fat meats  Soda, juice  Beer, wine, and mixed drinks (alcohol)

## 2018-07-20 NOTE — CARE COORDINATION
7/20/18, 2:06 PM      Home with wife; denies needs. Discharge plan discussed by  and . Discharge plan reviewed with patient/ family. Patient/ family verbalize understanding of discharge plan and are in agreement with plan. Understanding was demonstrated using the teach back method.

## 2018-07-20 NOTE — PROGRESS NOTES
CLINICAL PHARMACY: [facility] Patient?: No  Total # of Interventions Recommended: 0   -   Total # Interventions Accepted: 0  Intervention Severity:   - Level 1 Intervention Present?: No   - Level 2 #: 0   - Level 3 #: 0   Time Spent (min): 15    Additional Documentation:    Angeles Dobbins PharmD, BCPS   7/20/2018  10:27 AM

## 2018-07-20 NOTE — PLAN OF CARE
Problem: Pain:  Goal: Pain level will decrease  Pain level will decrease   Outcome: Ongoing  Pain Assessment: 0-10  Pain Level: 7   Pain goal:  3  Is pain goal met at this time? No  Pain Intervention(s): Medication (see eMar)  Additional interventions to be implemented: cold, medications Oxycodone Q4 and position change      Problem: Falls - Risk of:  Goal: Will remain free from falls  Will remain free from falls   Outcome: Ongoing  No falls this shift. Patient up with one assist to bathroom and ambulated in halls this shift. Steady when up. Problem: Discharge Planning:  Goal: Discharged to appropriate level of care  Discharged to appropriate level of care  Outcome: Ongoing  Hopefully discharged today. Problem: Nutrition  Goal: Optimal nutrition therapy  Outcome: Ongoing  Tried chicken broth and jello for nighttime snack bc patient stated he felt hungry. Still did not tolerate very well. No vomiting but patient complained of not settling and only had a few bites of each. Comments: Care plan reviewed with patient and wife. Patient and wife verbalize understanding of the plan of care and contribute to goal setting.

## 2018-07-21 LAB
BLOOD CULTURE, ROUTINE: NORMAL
BLOOD CULTURE, ROUTINE: NORMAL

## 2018-07-23 ENCOUNTER — HOSPITAL ENCOUNTER (EMERGENCY)
Age: 63
Discharge: HOME | End: 2018-07-23
Attending: FAMILY MEDICINE
Payer: COMMERCIAL

## 2018-07-23 VITALS
BODY MASS INDEX: 31.92 KG/M2 | HEART RATE: 63 BPM | TEMPERATURE: 97.3 F | HEIGHT: 70 IN | DIASTOLIC BLOOD PRESSURE: 80 MMHG | RESPIRATION RATE: 20 BRPM | SYSTOLIC BLOOD PRESSURE: 110 MMHG | OXYGEN SATURATION: 97 % | WEIGHT: 223 LBS

## 2018-07-23 DIAGNOSIS — K85.00 IDIOPATHIC ACUTE PANCREATITIS WITHOUT INFECTION OR NECROSIS: ICD-10-CM

## 2018-07-23 DIAGNOSIS — I51.89 DIASTOLIC DYSFUNCTION WITHOUT HEART FAILURE: ICD-10-CM

## 2018-07-23 DIAGNOSIS — N18.2 CKD (CHRONIC KIDNEY DISEASE), STAGE II: ICD-10-CM

## 2018-07-23 DIAGNOSIS — N30.00 ACUTE CYSTITIS WITHOUT HEMATURIA: Primary | ICD-10-CM

## 2018-07-23 DIAGNOSIS — I10 ESSENTIAL HYPERTENSION: ICD-10-CM

## 2018-07-23 LAB
ALBUMIN SERPL-MCNC: 3.1 G/DL (ref 3.5–5.1)
ALP BLD-CCNC: 132 U/L (ref 38–126)
ALT SERPL-CCNC: 51 U/L (ref 11–66)
AMYLASE: 82 U/L (ref 20–104)
ANION GAP SERPL CALCULATED.3IONS-SCNC: 15 MEQ/L (ref 8–16)
APTT: 27.8 SECONDS (ref 22–38)
AST SERPL-CCNC: 34 U/L (ref 5–40)
BACTERIA: ABNORMAL
BASOPHILS # BLD: 0.4 %
BASOPHILS ABSOLUTE: 0.1 THOU/MM3 (ref 0–0.1)
BILIRUB SERPL-MCNC: 1.3 MG/DL (ref 0.3–1.2)
BILIRUBIN URINE: ABNORMAL
BLOOD, URINE: NEGATIVE
BUN BLDV-MCNC: 26 MG/DL (ref 7–22)
CALCIUM SERPL-MCNC: 9.5 MG/DL (ref 8.5–10.5)
CASTS: ABNORMAL /LPF
CASTS: ABNORMAL /LPF
CHARACTER, URINE: ABNORMAL
CHLORIDE BLD-SCNC: 94 MEQ/L (ref 98–111)
CO2: 29 MEQ/L (ref 23–33)
COLOR: ABNORMAL
CREAT SERPL-MCNC: 1.4 MG/DL (ref 0.4–1.2)
CRYSTALS: ABNORMAL
EKG ATRIAL RATE: 73 BPM
EKG P AXIS: 14 DEGREES
EKG P-R INTERVAL: 138 MS
EKG Q-T INTERVAL: 404 MS
EKG QRS DURATION: 96 MS
EKG QTC CALCULATION (BAZETT): 445 MS
EKG R AXIS: -27 DEGREES
EKG T AXIS: 27 DEGREES
EKG VENTRICULAR RATE: 73 BPM
EOSINOPHIL # BLD: 0.5 %
EOSINOPHILS ABSOLUTE: 0.1 THOU/MM3 (ref 0–0.4)
EPITHELIAL CELLS, UA: ABNORMAL /HPF
ERYTHROCYTE [DISTWIDTH] IN BLOOD BY AUTOMATED COUNT: 15.6 % (ref 11.5–14.5)
ERYTHROCYTE [DISTWIDTH] IN BLOOD BY AUTOMATED COUNT: 49.1 FL (ref 35–45)
GFR SERPL CREATININE-BSD FRML MDRD: 51 ML/MIN/1.73M2
GLUCOSE BLD-MCNC: 150 MG/DL (ref 70–108)
GLUCOSE, URINE: NEGATIVE MG/DL
HCT VFR BLD CALC: 43.4 % (ref 42–52)
HEMOGLOBIN: 14.9 GM/DL (ref 14–18)
ICTOTEST: NEGATIVE
IMMATURE GRANS (ABS): 0.2 THOU/MM3 (ref 0–0.07)
IMMATURE GRANULOCYTES: 1 %
INR BLD: 1.24 (ref 0.85–1.13)
KETONES, URINE: ABNORMAL
LACTIC ACID: 2.7 MMOL/L (ref 0.5–2.2)
LEUKOCYTE EST, POC: NEGATIVE
LIPASE: 59.4 U/L (ref 5.6–51.3)
LYMPHOCYTES # BLD: 9.2 %
LYMPHOCYTES ABSOLUTE: 1.8 THOU/MM3 (ref 1–4.8)
MCH RBC QN AUTO: 29.6 PG (ref 26–33)
MCHC RBC AUTO-ENTMCNC: 34.3 GM/DL (ref 32.2–35.5)
MCV RBC AUTO: 86.3 FL (ref 80–94)
MISCELLANEOUS LAB TEST RESULT: ABNORMAL
MONOCYTES # BLD: 8 %
MONOCYTES ABSOLUTE: 1.6 THOU/MM3 (ref 0.4–1.3)
NITRITE, URINE: NEGATIVE
NUCLEATED RED BLOOD CELLS: 0 /100 WBC
OSMOLALITY CALCULATION: 283.3 MOSMOL/KG (ref 275–300)
PH UA: 5
PLATELET # BLD: 390 THOU/MM3 (ref 130–400)
PMV BLD AUTO: 9.9 FL (ref 9.4–12.4)
POTASSIUM REFLEX MAGNESIUM: 3.9 MEQ/L (ref 3.5–5.2)
PROTEIN UA: ABNORMAL MG/DL
RBC # BLD: 5.03 MILL/MM3 (ref 4.7–6.1)
RBC URINE: ABNORMAL /HPF
RENAL EPITHELIAL, UA: ABNORMAL
SEG NEUTROPHILS: 80.9 %
SEGMENTED NEUTROPHILS ABSOLUTE COUNT: 16.2 THOU/MM3 (ref 1.8–7.7)
SODIUM BLD-SCNC: 138 MEQ/L (ref 135–145)
SPECIFIC GRAVITY UA: 1.03 (ref 1–1.03)
TOTAL PROTEIN: 6.9 G/DL (ref 6.1–8)
UROBILINOGEN, URINE: 1 EU/DL
WBC # BLD: 20 THOU/MM3 (ref 4.8–10.8)
WBC UA: ABNORMAL /HPF
YEAST: ABNORMAL

## 2018-07-23 PROCEDURE — 93005 ELECTROCARDIOGRAM TRACING: CPT | Performed by: FAMILY MEDICINE

## 2018-07-23 PROCEDURE — 99284 EMERGENCY DEPT VISIT MOD MDM: CPT

## 2018-07-23 PROCEDURE — 81001 URINALYSIS AUTO W/SCOPE: CPT

## 2018-07-23 PROCEDURE — 96365 THER/PROPH/DIAG IV INF INIT: CPT

## 2018-07-23 PROCEDURE — 36415 COLL VENOUS BLD VENIPUNCTURE: CPT

## 2018-07-23 PROCEDURE — 85025 COMPLETE CBC W/AUTO DIFF WBC: CPT

## 2018-07-23 PROCEDURE — 82150 ASSAY OF AMYLASE: CPT

## 2018-07-23 PROCEDURE — 83605 ASSAY OF LACTIC ACID: CPT

## 2018-07-23 PROCEDURE — 85730 THROMBOPLASTIN TIME PARTIAL: CPT

## 2018-07-23 PROCEDURE — 6360000002 HC RX W HCPCS: Performed by: FAMILY MEDICINE

## 2018-07-23 PROCEDURE — 93010 ELECTROCARDIOGRAM REPORT: CPT | Performed by: INTERNAL MEDICINE

## 2018-07-23 PROCEDURE — 2580000003 HC RX 258: Performed by: FAMILY MEDICINE

## 2018-07-23 PROCEDURE — 80053 COMPREHEN METABOLIC PANEL: CPT

## 2018-07-23 PROCEDURE — 85610 PROTHROMBIN TIME: CPT

## 2018-07-23 PROCEDURE — 83690 ASSAY OF LIPASE: CPT

## 2018-07-23 RX ORDER — 0.9 % SODIUM CHLORIDE 0.9 %
1000 INTRAVENOUS SOLUTION INTRAVENOUS ONCE
Status: COMPLETED | OUTPATIENT
Start: 2018-07-23 | End: 2018-07-23

## 2018-07-23 RX ORDER — AMOXICILLIN AND CLAVULANATE POTASSIUM 875; 125 MG/1; MG/1
1 TABLET, FILM COATED ORAL 2 TIMES DAILY
Qty: 20 TABLET | Refills: 0 | Status: SHIPPED | OUTPATIENT
Start: 2018-07-23 | End: 2018-08-02

## 2018-07-23 RX ORDER — ONDANSETRON 4 MG/1
4 TABLET, FILM COATED ORAL EVERY 8 HOURS PRN
Qty: 20 TABLET | Refills: 0 | Status: SHIPPED | OUTPATIENT
Start: 2018-07-23 | End: 2018-08-12

## 2018-07-23 RX ADMIN — SODIUM CHLORIDE 1000 ML: 9 INJECTION, SOLUTION INTRAVENOUS at 13:56

## 2018-07-23 RX ADMIN — CEFTRIAXONE SODIUM 1 G: 1 INJECTION, POWDER, FOR SOLUTION INTRAMUSCULAR; INTRAVENOUS at 13:56

## 2018-07-23 ASSESSMENT — ENCOUNTER SYMPTOMS
EYE PAIN: 0
ABDOMINAL PAIN: 0
BLOOD IN STOOL: 0
SHORTNESS OF BREATH: 1
COUGH: 0
EYE DISCHARGE: 0
SORE THROAT: 0
CHEST TIGHTNESS: 0

## 2018-07-23 NOTE — ED TRIAGE NOTES
Patient presents to ED from outpatient lab with c/o hypotension. Patient states that he was supposed to go get labs done today to check BMP and hepatic function test. States that was ordered because he was just discharged from hospital after having been diagnosed with acute pancreatitis. Denies any pain. Call light in reach.

## 2018-07-23 NOTE — ED PROVIDER NOTES
cardiologist.  EKG interpreted by Odalis Barrett MD:    Vent. Rate: 73 bpm  AL interval: 138 ms  QRS duration: 96 ms  QTc: 445 ms  P-R-T axes: 14, -27, 27  Normal Sinus, No STEMI      RADIOLOGY: non-plain film images(s) such as CT, Ultrasound and MRI are read by the radiologist.    No orders to display       LABS:   Labs Reviewed   PROTIME-INR - Abnormal; Notable for the following:        Result Value    INR 1.24 (*)     All other components within normal limits   CBC WITH AUTO DIFFERENTIAL - Abnormal; Notable for the following:     WBC 20.0 (*)     RDW-CV 15.6 (*)     RDW-SD 49.1 (*)     Segs Absolute 16.2 (*)     Monocytes # 1.6 (*)     Immature Grans (Abs) 0.20 (*)     All other components within normal limits   COMPREHENSIVE METABOLIC PANEL W/ REFLEX TO MG FOR LOW K - Abnormal; Notable for the following:     Glucose 150 (*)     CREATININE 1.4 (*)     BUN 26 (*)     Chloride 94 (*)     Alkaline Phosphatase 132 (*)     Alb 3.1 (*)     Total Bilirubin 1.3 (*)     All other components within normal limits   LIPASE - Abnormal; Notable for the following:     Lipase 59.4 (*)     All other components within normal limits   LACTIC ACID, PLASMA - Abnormal; Notable for the following:     Lactic Acid 2.7 (*)     All other components within normal limits   GLOMERULAR FILTRATION RATE, ESTIMATED - Abnormal; Notable for the following:     Est, Glom Filt Rate 51 (*)     All other components within normal limits   APTT   AMYLASE   ANION GAP   OSMOLALITY   URINALYSIS       EMERGENCY DEPARTMENT COURSE:   Vitals:    Vitals:    07/23/18 1230 07/23/18 1308   BP: 117/83 111/80   Pulse: 73 69   Resp: 19 22   Temp: 97.3 °F (36.3 °C)    TempSrc: Oral    SpO2: 98% 94%   Weight: 223 lb (101.2 kg)    Height: 5' 10\" (1.778 m)        12:53 PM: The patient was seen and evaluated. MDM:  The patient really presented here because he became diaphoretic while getting his blood drawn.   The patient is also been taking his labetalol 3 times a day with his average systolic pressure about 178. Patient has decided to reduce his labetalol to twice a day today. The patient also presents because he needed blood work as a follow-up and he is following up with gastroenterology and family practitioner doctors. Patient did have a recent hospitalization was discharged 2 days ago after an episode of increasing otitis. He has been eating and drinking normally at home now. Patient is otherwise had recent cholecystectomy and for which he had complications postoperatively he had an ERCP showing some common bile duct sludge. Currently now the patient's had a sphincterotomy that should relieve that. And that he no longer has any severe abdominal pain and has a very benign exam upon evaluation here. CRITICAL CARE:   None      CONSULTS:  none    PROCEDURES:  None      FINAL IMPRESSION      1. Acute cystitis without hematuria    2. Idiopathic acute pancreatitis without infection or necrosis    3. CKD (chronic kidney disease), stage II    4. Essential hypertension    5. Diastolic dysfunction without heart failure          DISPOSITION/PLAN   discharge    PATIENT REFERRED TO:  No follow-up provider specified.     DISCHARGE MEDICATIONS:  New Prescriptions    AMOXICILLIN-CLAVULANATE (AUGMENTIN) 875-125 MG PER TABLET    Take 1 tablet by mouth 2 times daily for 10 days    ONDANSETRON (ZOFRAN) 4 MG TABLET    Take 1 tablet by mouth every 8 hours as needed for Nausea       (Please note that portions of this note were completed with a voice recognition program.  Efforts were made to edit the dictations but occasionally words are mis-transcribed.)    Scribe:  Gutierrez Charles  7/23/18 12:53 PM Scribing for and in the presence of Miko Kearney MD.    Signed by: Macho Blackwood, 07/23/18 1:34 PM    Provider:  I personally performed the services described in the documentation, reviewed and edited the documentation which was dictated to the

## 2018-07-27 ENCOUNTER — OFFICE VISIT (OUTPATIENT)
Dept: FAMILY MEDICINE CLINIC | Age: 63
End: 2018-07-27
Payer: COMMERCIAL

## 2018-07-27 VITALS
BODY MASS INDEX: 29.99 KG/M2 | SYSTOLIC BLOOD PRESSURE: 108 MMHG | RESPIRATION RATE: 16 BRPM | WEIGHT: 209 LBS | DIASTOLIC BLOOD PRESSURE: 70 MMHG | HEART RATE: 80 BPM | TEMPERATURE: 97.8 F

## 2018-07-27 DIAGNOSIS — E86.0 MODERATE DEHYDRATION: ICD-10-CM

## 2018-07-27 DIAGNOSIS — E26.9 HYPERALDOSTERONISM (HCC): ICD-10-CM

## 2018-07-27 DIAGNOSIS — K85.00 IDIOPATHIC ACUTE PANCREATITIS, UNSPECIFIED COMPLICATION STATUS: ICD-10-CM

## 2018-07-27 DIAGNOSIS — I15.2 HYPERTENSION DUE TO ENDOCRINE DISORDER: ICD-10-CM

## 2018-07-27 DIAGNOSIS — Z09 HOSPITAL DISCHARGE FOLLOW-UP: Primary | ICD-10-CM

## 2018-07-27 PROCEDURE — 1111F DSCHRG MED/CURRENT MED MERGE: CPT | Performed by: NURSE PRACTITIONER

## 2018-07-27 PROCEDURE — 99495 TRANSJ CARE MGMT MOD F2F 14D: CPT | Performed by: NURSE PRACTITIONER

## 2018-07-27 RX ORDER — LABETALOL 300 MG/1
150 TABLET, FILM COATED ORAL 2 TIMES DAILY
Qty: 60 TABLET | Refills: 0
Start: 2018-07-27 | End: 2019-06-11 | Stop reason: SDUPTHER

## 2018-07-27 ASSESSMENT — ENCOUNTER SYMPTOMS
COLOR CHANGE: 0
VOMITING: 0
COUGH: 0
NAUSEA: 0
RHINORRHEA: 0
SORE THROAT: 0
STRIDOR: 0
BACK PAIN: 0
EYE DISCHARGE: 0
ABDOMINAL PAIN: 0
WHEEZING: 0
EYE PAIN: 0
DIARRHEA: 0
CONSTIPATION: 0
SHORTNESS OF BREATH: 0

## 2018-07-27 NOTE — PROGRESS NOTES
needed for Nausea or Vomiting             ondansetron (ZOFRAN) 4 MG tablet  Take 1 tablet by mouth every 8 hours as needed for Nausea             PARoxetine (PAXIL) 20 MG tablet  Take 1 tablet by mouth every morning             potassium & sodium phosphates (PHOS-NAK) 280-160-250 MG PACK  Take 1 packet by mouth 3 times daily for 5 days                   Medications marked \"taking\" at this time  Outpatient Prescriptions Marked as Taking for the 7/27/18 encounter (Office Visit) with ANNE MARIE Peng CNP   Medication Sig Dispense Refill    labetalol (TRANDATE) 300 MG tablet Take 0.5 tablets by mouth 2 times daily 60 tablet 0    cyclobenzaprine (FLEXERIL) 10 MG tablet Take 1 tablet by mouth 3 times daily as needed for Muscle spasms 30 tablet 0    amLODIPine (NORVASC) 10 MG tablet Take 1 tablet by mouth daily - if your blood pressure starts to run low (top number less than 110), it is OK to stop this medicine. 30 tablet 2    chlorthalidone (HYGROTEN) 50 MG tablet Take 1 tablet by mouth daily (Patient taking differently: Take 25 mg by mouth daily ) 30 tablet 2    eplerenone (INSPRA) 50 MG tablet Take 1 tablet by mouth 2 times daily 60 tablet 1    PARoxetine (PAXIL) 20 MG tablet Take 1 tablet by mouth every morning 30 tablet 11        Medications patient taking as of now reconciled against medications ordered at time of hospital discharge: Yes    Vitals:    07/27/18 0806   BP: 108/70   Pulse: 80   Resp: 16   Temp: 97.8 °F (36.6 °C)   TempSrc: Oral   Weight: 209 lb (94.8 kg)     Body mass index is 29.99 kg/m². Wt Readings from Last 3 Encounters:   07/27/18 209 lb (94.8 kg)   07/23/18 223 lb (101.2 kg)   07/20/18 223 lb 3 oz (101.2 kg)     BP Readings from Last 3 Encounters:   07/27/18 108/70   07/23/18 110/80   07/20/18 122/75        Inpatient course: Discharge summary reviewed- see chart.     Chief Complaint   Patient presents with    Follow-Up from Hospital     follow-up pancreatitis, pt complains of complete. Discussed with patient his clinical picture includes moderate dehydration and he'll need to increase his water intake. He knowledge is his understanding of this. Also complete labs in about 1 month and he will follow-up in no more than 3 months. Based upon reducing his labetalol dose, he will keep track of his blood pressures daily and report them in 1 week for follow-up. Labetalol dosing changed to 150 mg twice a day. This done in consultation with Dr. Sarika Prado- per patient request.    Diagnoses and all orders for this visit:    Hospital discharge follow-up    Idiopathic acute pancreatitis, unspecified complication status  -     CBC With Auto Differential; Future  -     Basic Metabolic Panel; Future  -     Hepatic Function Panel; Future  -     Protime-INR; Future    Hypertension due to endocrine disorder  -     labetalol (TRANDATE) 300 MG tablet;  Take 0.5 tablets by mouth 2 times daily    Hyperaldosteronism- Dr. Faina Negron Making: high complexity

## 2018-07-27 NOTE — PATIENT INSTRUCTIONS
the gallstone may need to be removed. This is done during a procedure called ERCP. The doctor puts a scope in your mouth and moves it gently through the stomach and into the ducts from the pancreas and gallbladder. The doctor is then able to see a stone and remove it. Sometimes the gallbladder, which makes gallstones, needs to be removed by surgery. People with pancreatitis often need a lot of fluid to help support their other organs and their blood pressure. They get fluids through a vein (intravenous, or IV). Instead of food by mouth, nutrition is sometimes given through a vein while the pancreas heals. Follow-up care is a key part of your treatment and safety. Be sure to make and go to all appointments, and call your doctor if you are having problems. It's also a good idea to know your test results and keep a list of the medicines you take. Where can you learn more? Go to https://chpegovindeweb.[employer] Media. org and sign in to your [employer] account. Enter I698 in the Qmerce box to learn more about \"Learning About Acute Pancreatitis. \"     If you do not have an account, please click on the \"Sign Up Now\" link. Current as of: May 12, 2017  Content Version: 11.6  © 7442-8893 [employer]. Care instructions adapted under license by [facility] ([facility]). If you have questions about a medical condition or this instruction, always ask your healthcare professional. [place] [address] any warranty or liability for your use of this information. Patient Education        Acute High Blood Pressure: Care Instructions  Your Care Instructions    Acute high blood pressure is very high blood pressure. It's a serious problem. Very high blood pressure can damage your brain, heart, eyes, and kidneys. You may have been given medicines to lower your blood pressure. You may have gotten them as pills or through a needle in one of your veins. This is called an IV.  And maybe you were given other medicines too. These can be needed when high blood pressure causes other problems. To keep your blood pressure at a lower level, you may need to make healthy lifestyle changes. And you will probably need to take medicines. Be sure to follow up with your doctor about your blood pressure and what you can do about it. Follow-up care is a key part of your treatment and safety. Be sure to make and go to all appointments, and call your doctor if you are having problems. It's also a good idea to know your test results and keep a list of the medicines you take. How can you care for yourself at home? · See your doctor as often as he or she recommends. This is to make sure your blood pressure is under control. You will probably go at least 2 times a year. But it may be more often at first.  · Take your blood pressure medicine exactly as prescribed. You may take one or more types. They include diuretics, beta-blockers, ACE inhibitors, calcium channel blockers, and angiotensin II receptor blockers. Call your doctor if you think you are having a problem with your medicine. · If you take blood pressure medicine, talk to your doctor before you take decongestants or anti-inflammatory medicine, such as ibuprofen. These can raise blood pressure. · Learn how to check your blood pressure at home. Check it often. · Ask your doctor if it's okay to drink alcohol. · Talk to your doctor about lifestyle changes that can help blood pressure. These include being active and not smoking. When should you call for help? Call 911 anytime you think you may need emergency care. This may mean having symptoms that suggest that your blood pressure is causing a serious heart or blood vessel problem. Your blood pressure may be over 180/110.   For example, call 911 if:    · You have symptoms of a heart attack. These may include:  ¨ Chest pain or pressure, or a strange feeling in the chest.  ¨ Sweating. ¨ Shortness of breath.   ¨ Nausea or vomiting. ¨ Pain, pressure, or a strange feeling in the back, neck, jaw, or upper belly or in one or both shoulders or arms. ¨ Lightheadedness or sudden weakness. ¨ A fast or irregular heartbeat.     · You have symptoms of a stroke. These may include:  ¨ Sudden numbness, tingling, weakness, or loss of movement in your face, arm, or leg, especially on only one side of your body. ¨ Sudden vision changes. ¨ Sudden trouble speaking. ¨ Sudden confusion or trouble understanding simple statements. ¨ Sudden problems with walking or balance. ¨ A sudden, severe headache that is different from past headaches.     · You have severe back or belly pain.    Do not wait until your blood pressure comes down on its own. Get help right away.   Call your doctor now or seek immediate care if:    · Your blood pressure is much higher than normal (such as 180/110 or higher), but you don't have symptoms.     · You think high blood pressure is causing symptoms, such as:  ¨ Severe headache. ¨ Blurry vision.    Watch closely for changes in your health, and be sure to contact your doctor if:    · Your blood pressure measures 140/90 or higher at least 2 times. That means the top number is 140 or higher or the bottom number is 90 or higher, or both.     · You think you may be having side effects from your blood pressure medicine.     · Your blood pressure is usually normal, but it goes above normal at least 2 times. Where can you learn more? Go to https://SaiseipeStipple.Freshtake Media. org and sign in to your LeadCloud account. Enter E879 in the "[employer] box to learn more about \"Acute High Blood Pressure: Care Instructions. \"     If you do not have an account, please click on the \"Sign Up Now\" link. Current as of: May 10, 2017  Content Version: 11.6  © 4720-2438 [employer]. Care instructions adapted under license by [address]  If you have questions about a medical condition or this instruction, always ask

## 2018-07-30 ENCOUNTER — HOSPITAL ENCOUNTER (OUTPATIENT)
Age: 63
Discharge: HOME | End: 2018-07-30
Payer: COMMERCIAL

## 2018-07-30 DIAGNOSIS — Z87.19 HISTORY OF PANCREATITIS: ICD-10-CM

## 2018-07-30 DIAGNOSIS — R10.817 GENERALIZED ABDOMINAL TENDERNESS WITHOUT REBOUND TENDERNESS: ICD-10-CM

## 2018-07-30 DIAGNOSIS — Z98.890 S/P ERCP: ICD-10-CM

## 2018-07-30 DIAGNOSIS — R74.8 ELEVATED LIPASE: ICD-10-CM

## 2018-07-30 LAB
AMYLASE: 109 U/L (ref 20–104)
LIPASE: 62.9 U/L (ref 5.6–51.3)

## 2018-07-30 PROCEDURE — 82150 ASSAY OF AMYLASE: CPT

## 2018-07-30 PROCEDURE — 83690 ASSAY OF LIPASE: CPT

## 2018-07-30 PROCEDURE — 36415 COLL VENOUS BLD VENIPUNCTURE: CPT

## 2018-09-06 ENCOUNTER — TELEPHONE (OUTPATIENT)
Dept: FAMILY MEDICINE CLINIC | Age: 63
End: 2018-09-06

## 2018-09-06 ENCOUNTER — HOSPITAL ENCOUNTER (OUTPATIENT)
Age: 63
Discharge: HOME | End: 2018-09-06
Payer: COMMERCIAL

## 2018-09-06 DIAGNOSIS — K85.00 IDIOPATHIC ACUTE PANCREATITIS, UNSPECIFIED COMPLICATION STATUS: ICD-10-CM

## 2018-09-06 LAB
ALBUMIN SERPL-MCNC: 4 G/DL (ref 3.5–5.1)
ALP BLD-CCNC: 65 U/L (ref 38–126)
ALT SERPL-CCNC: 36 U/L (ref 11–66)
ANION GAP SERPL CALCULATED.3IONS-SCNC: 15 MEQ/L (ref 8–16)
AST SERPL-CCNC: 28 U/L (ref 5–40)
BASOPHILS # BLD: 0.7 %
BASOPHILS ABSOLUTE: 0 THOU/MM3 (ref 0–0.1)
BILIRUB SERPL-MCNC: 0.7 MG/DL (ref 0.3–1.2)
BILIRUBIN DIRECT: < 0.2 MG/DL (ref 0–0.3)
BUN BLDV-MCNC: 19 MG/DL (ref 7–22)
CALCIUM SERPL-MCNC: 9.7 MG/DL (ref 8.5–10.5)
CHLORIDE BLD-SCNC: 101 MEQ/L (ref 98–111)
CO2: 30 MEQ/L (ref 23–33)
CREAT SERPL-MCNC: 1.4 MG/DL (ref 0.4–1.2)
EOSINOPHIL # BLD: 4.2 %
EOSINOPHILS ABSOLUTE: 0.2 THOU/MM3 (ref 0–0.4)
ERYTHROCYTE [DISTWIDTH] IN BLOOD BY AUTOMATED COUNT: 14.9 % (ref 11.5–14.5)
ERYTHROCYTE [DISTWIDTH] IN BLOOD BY AUTOMATED COUNT: 47.4 FL (ref 35–45)
GFR SERPL CREATININE-BSD FRML MDRD: 51 ML/MIN/1.73M2
GLUCOSE BLD-MCNC: 119 MG/DL (ref 70–108)
HCT VFR BLD CALC: 39.3 % (ref 42–52)
HEMOGLOBIN: 13.3 GM/DL (ref 14–18)
IMMATURE GRANS (ABS): 0.01 THOU/MM3 (ref 0–0.07)
IMMATURE GRANULOCYTES: 0.2 %
INR BLD: 0.94 (ref 0.85–1.13)
LYMPHOCYTES # BLD: 44.8 %
LYMPHOCYTES ABSOLUTE: 2.6 THOU/MM3 (ref 1–4.8)
MCH RBC QN AUTO: 29.4 PG (ref 26–33)
MCHC RBC AUTO-ENTMCNC: 33.8 GM/DL (ref 32.2–35.5)
MCV RBC AUTO: 86.9 FL (ref 80–94)
MONOCYTES # BLD: 9.9 %
MONOCYTES ABSOLUTE: 0.6 THOU/MM3 (ref 0.4–1.3)
NUCLEATED RED BLOOD CELLS: 0 /100 WBC
PLATELET # BLD: 238 THOU/MM3 (ref 130–400)
PMV BLD AUTO: 10.5 FL (ref 9.4–12.4)
POTASSIUM SERPL-SCNC: 3.3 MEQ/L (ref 3.5–5.2)
RBC # BLD: 4.52 MILL/MM3 (ref 4.7–6.1)
SEG NEUTROPHILS: 40.2 %
SEGMENTED NEUTROPHILS ABSOLUTE COUNT: 2.3 THOU/MM3 (ref 1.8–7.7)
SODIUM BLD-SCNC: 146 MEQ/L (ref 135–145)
TOTAL PROTEIN: 7.2 G/DL (ref 6.1–8)
WBC # BLD: 5.7 THOU/MM3 (ref 4.8–10.8)

## 2018-09-06 PROCEDURE — 80053 COMPREHEN METABOLIC PANEL: CPT

## 2018-09-06 PROCEDURE — 85610 PROTHROMBIN TIME: CPT

## 2018-09-06 PROCEDURE — 36415 COLL VENOUS BLD VENIPUNCTURE: CPT

## 2018-09-06 PROCEDURE — 85025 COMPLETE CBC W/AUTO DIFF WBC: CPT

## 2018-09-06 PROCEDURE — 82248 BILIRUBIN DIRECT: CPT

## 2018-09-06 NOTE — TELEPHONE ENCOUNTER
Spoke with patient. Denies any recent bleeding issues. Has upcoming appt here on 9/18/18. Please advise.

## 2018-09-06 NOTE — TELEPHONE ENCOUNTER
The pt did call back and left a message on the nurse line stating that he thought about what he and Faviola spoke about and he wanted ES to know that he has been having a lot of fatigue during the day, where he has to rest and take naps during his lunch break. He also states that he has vertigo and gave the example of if he bends down to  a pencil off the floor he has very bad vertigo and has to \"sit down or I will fall down\". Please advise.

## 2018-09-06 NOTE — TELEPHONE ENCOUNTER
His mild anemia would not cause these symptoms. May need appt with WS sooner if desired as pt may benefit from vestibular rehab if he has BPPV. Let us know.   ES

## 2018-09-18 ENCOUNTER — OFFICE VISIT (OUTPATIENT)
Dept: FAMILY MEDICINE CLINIC | Age: 63
End: 2018-09-18
Payer: COMMERCIAL

## 2018-09-18 VITALS
RESPIRATION RATE: 20 BRPM | TEMPERATURE: 98.4 F | DIASTOLIC BLOOD PRESSURE: 80 MMHG | WEIGHT: 219.8 LBS | HEART RATE: 72 BPM | SYSTOLIC BLOOD PRESSURE: 116 MMHG | BODY MASS INDEX: 34.43 KG/M2

## 2018-09-18 DIAGNOSIS — F41.9 ANXIETY: ICD-10-CM

## 2018-09-18 DIAGNOSIS — E78.5 HYPERLIPIDEMIA WITH TARGET LDL LESS THAN 100: ICD-10-CM

## 2018-09-18 DIAGNOSIS — E26.09 PRIMARY HYPERALDOSTERONISM (HCC): ICD-10-CM

## 2018-09-18 DIAGNOSIS — D64.9 ANEMIA, UNSPECIFIED TYPE: ICD-10-CM

## 2018-09-18 DIAGNOSIS — R73.01 IFG (IMPAIRED FASTING GLUCOSE): Primary | ICD-10-CM

## 2018-09-18 DIAGNOSIS — I15.2 HYPERTENSION DUE TO ENDOCRINE DISORDER: ICD-10-CM

## 2018-09-18 PROBLEM — R79.89 ELEVATED LFTS: Status: RESOLVED | Noted: 2018-07-17 | Resolved: 2018-09-18

## 2018-09-18 PROBLEM — N17.9 AKI (ACUTE KIDNEY INJURY) (HCC): Status: RESOLVED | Noted: 2018-07-16 | Resolved: 2018-09-18

## 2018-09-18 PROBLEM — K80.10 CHOLECYSTITIS WITH CHOLELITHIASIS: Status: RESOLVED | Noted: 2018-06-08 | Resolved: 2018-09-18

## 2018-09-18 PROBLEM — I16.0 HYPERTENSIVE URGENCY: Status: RESOLVED | Noted: 2018-07-16 | Resolved: 2018-09-18

## 2018-09-18 PROBLEM — R97.20 PSA ELEVATION: Status: RESOLVED | Noted: 2018-05-01 | Resolved: 2018-09-18

## 2018-09-18 PROBLEM — R94.8 ABNORMAL BILIARY HIDA SCAN: Status: RESOLVED | Noted: 2018-06-08 | Resolved: 2018-09-18

## 2018-09-18 PROBLEM — I10 UNCONTROLLED HYPERTENSION: Status: RESOLVED | Noted: 2018-03-26 | Resolved: 2018-09-18

## 2018-09-18 PROBLEM — B17.9 ACUTE HEPATITIS: Status: RESOLVED | Noted: 2018-07-16 | Resolved: 2018-09-18

## 2018-09-18 PROBLEM — D72.829 LEUKOCYTOSIS: Status: RESOLVED | Noted: 2018-07-16 | Resolved: 2018-09-18

## 2018-09-18 PROCEDURE — 99214 OFFICE O/P EST MOD 30 MIN: CPT | Performed by: FAMILY MEDICINE

## 2018-09-18 RX ORDER — ROSUVASTATIN CALCIUM 10 MG/1
10 TABLET, COATED ORAL DAILY
Qty: 30 TABLET | Refills: 1 | Status: SHIPPED | OUTPATIENT
Start: 2018-09-18 | End: 2018-10-29 | Stop reason: SDUPTHER

## 2018-09-18 ASSESSMENT — ENCOUNTER SYMPTOMS
CONSTIPATION: 0
DIARRHEA: 0
BLOOD IN STOOL: 0
ANAL BLEEDING: 0
VOMITING: 0
SHORTNESS OF BREATH: 0
NAUSEA: 0
CHEST TIGHTNESS: 0
ABDOMINAL PAIN: 0

## 2018-09-18 NOTE — PROGRESS NOTES
tablet 2    chlorthalidone (HYGROTEN) 50 MG tablet Take 1 tablet by mouth daily (Patient taking differently: Take 50 mg by mouth daily Unsure of dose) 30 tablet 2    acetaminophen (TYLENOL) 325 MG tablet Take 650 mg by mouth every 6 hours as needed for Pain      eplerenone (INSPRA) 50 MG tablet Take 1 tablet by mouth 2 times daily 60 tablet 1    PARoxetine (PAXIL) 20 MG tablet Take 1 tablet by mouth every morning 30 tablet 11     No current facility-administered medications for this visit. Review of Systems   Constitutional: Positive for diaphoresis (chronic x 18 months). Negative for chills, fatigue, fever and unexpected weight change. Eyes: Negative for visual disturbance. Respiratory: Negative for chest tightness and shortness of breath. Cardiovascular: Negative for chest pain, palpitations and leg swelling. Gastrointestinal: Negative for abdominal pain, anal bleeding, blood in stool, constipation, diarrhea, nausea and vomiting. Genitourinary: Negative for dysuria and hematuria. Musculoskeletal: Negative for neck pain. Neurological: Negative for dizziness, light-headedness and headaches. OBJECTIVE     /80 (Site: Right Upper Arm, Position: Sitting, Cuff Size: Large Adult)   Pulse 72   Temp 98.4 °F (36.9 °C) (Oral)   Resp 20   Wt 219 lb 12.8 oz (99.7 kg)   BMI 34.43 kg/m²     Wt Readings from Last 3 Encounters:   09/18/18 219 lb 12.8 oz (99.7 kg)   08/14/18 213 lb (96.6 kg)   07/27/18 210 lb (95.3 kg)     Body mass index is 34.43 kg/m². Physical Exam   Constitutional: He is oriented to person, place, and time. He appears well-developed and well-nourished. HENT:   Head: Normocephalic and atraumatic. Right Ear: External ear normal.   Left Ear: External ear normal.   Nose: Nose normal.   Mouth/Throat: Oropharynx is clear and moist.   Eyes: Pupils are equal, round, and reactive to light. Conjunctivae and EOM are normal.   Neck: Normal range of motion. Neck supple. Cardiovascular: Normal rate, regular rhythm and intact distal pulses. Pulmonary/Chest: Effort normal and breath sounds normal.   Abdominal: Soft. Bowel sounds are normal.   Genitourinary:   Genitourinary Comments: KEYSHA done 4/28/2018 per Dr. Cheema Devoid- Normal Rectum. KEYSHA deferred today as pt currently asymptomatic. Pt denies dysuria, hematuria, frequency, urgency, difficulty starting/ stopping stream, frequent nocturia    Will reconsider annually. ES       Musculoskeletal: Normal range of motion. Neurological: He is alert and oriented to person, place, and time. He has normal reflexes. Skin: Skin is warm and dry. Psychiatric: He has a normal mood and affect. His behavior is normal. Judgment and thought content normal.   Nursing note and vitals reviewed.       Lab Results   Component Value Date    LABA1C 5.8 03/09/2018       Lab Results   Component Value Date    CHOL 204 (H) 07/16/2018    TRIG 108 07/16/2018    HDL 41 07/16/2018    LDLCALC 141 07/16/2018    LDLDIRECT 127.09 09/06/2017       The 10-year ASCVD risk score (Mary Lou Hammonds, et al., 2013) is: 10.7%    Values used to calculate the score:      Age: 66 years      Sex: Male      Is Non- : No      Diabetic: No      Tobacco smoker: No      Systolic Blood Pressure: 799 mmHg      Is BP treated: Yes      HDL Cholesterol: 41 mg/dL      Total Cholesterol: 204 mg/dL    Lab Results   Component Value Date     (H) 09/06/2018    K 3.3 (L) 09/06/2018     09/06/2018    CO2 30 09/06/2018    BUN 19 09/06/2018    CREATININE 1.4 (H) 09/06/2018    GLUCOSE 119 (H) 09/06/2018    CALCIUM 9.7 09/06/2018    PROT 7.2 09/06/2018    LABALBU 4.0 09/06/2018    BILITOT 0.7 09/06/2018    ALKPHOS 65 09/06/2018    AST 28 09/06/2018    ALT 36 09/06/2018    LABGLOM 51 (A) 09/06/2018         Lab Results   Component Value Date    TSH 3.480 06/06/2018    T4FREE 0.94 07/27/2013       Lab Results   Component Value Date    WBC 5.7 09/06/2018    HGB 13.3 (L) 09/06/2018    HCT 39.3 (L) 09/06/2018    MCV 86.9 09/06/2018     09/06/2018       Lab Results   Component Value Date    PSA 1.01 (H) 03/09/2018    PSA 0.94 02/28/2017    PSA 2.53 (H) 01/18/2017       Immunization History   Administered Date(s) Administered    Influenza Virus Vaccine 10/10/2013    Tdap (Boostrix, Adacel) 05/01/2018    Zoster Live (Zostavax) 11/11/2013       Health Maintenance   Topic Date Due    Shingles Vaccine (1 of 2 - 2 Dose Series) 01/11/2014    Flu vaccine (1) 09/18/2019 (Originally 9/1/2018)    HIV screen  09/18/2019 (Originally 3/15/1970)    A1C test (Diabetic or Prediabetic)  03/09/2019    Potassium monitoring  09/06/2019    Creatinine monitoring  09/06/2019    Colon cancer screen colonoscopy  04/28/2021    Lipid screen  07/16/2023    DTaP/Tdap/Td vaccine (2 - Td) 05/01/2028    Hepatitis C screen  Completed       AAA ultrasound (Male, 65-75, smoked ever) indicated at this time? NO, age 66  CT Lung Screen (46-80, 30 pk-yrs, smoking or quit <15 years) indicated at this time? NO, no tobacco history     Future Appointments  Date Time Provider Omar Buenoi   10/9/2018 10:15 AM ANNE MARIE Novak - CNP [facility]   10/29/2018 2:30 PM Shraddha Chapa MD [address]   12/13/2018 1:20 PM Francisco Murcia MD [facility]       ASSESSMENT       Diagnosis Orders   1. IFG (impaired fasting glucose)  Hemoglobin A1C    Comprehensive Metabolic Panel   2. Hypertension due to endocrine disorder  Comprehensive Metabolic Panel   3. Primary hyperaldosteronism ([address]- Dr. Alvaro Duffy   4. Hyperlipidemia with target LDL less than 100  Lipid Panel    Comprehensive Metabolic Panel    rosuvastatin (CRESTOR) 10 MG tablet   5. Anemia, unspecified type  Hemoglobin and Hematocrit, Blood   6. Anxiety       PLAN      Encouraged annual FLU VACCINE- pt declines (updated 9/18/2018).     Encouraged NEW SHINGLES SHOT [facility]) - check with

## 2018-10-11 ENCOUNTER — HOSPITAL ENCOUNTER (OUTPATIENT)
Age: 63
Discharge: HOME | End: 2018-10-11
Payer: COMMERCIAL

## 2018-10-11 DIAGNOSIS — Z87.19 HISTORY OF PANCREATITIS: ICD-10-CM

## 2018-10-11 DIAGNOSIS — R11.10 INTERMITTENT VOMITING: ICD-10-CM

## 2018-10-11 DIAGNOSIS — R10.816 EPIGASTRIC ABDOMINAL TENDERNESS WITHOUT REBOUND TENDERNESS: ICD-10-CM

## 2018-10-11 LAB
AMYLASE: 86 U/L (ref 20–104)
LIPASE: 16.1 U/L (ref 5.6–51.3)

## 2018-10-11 PROCEDURE — 83690 ASSAY OF LIPASE: CPT

## 2018-10-11 PROCEDURE — 82150 ASSAY OF AMYLASE: CPT

## 2018-10-11 PROCEDURE — 36415 COLL VENOUS BLD VENIPUNCTURE: CPT

## 2018-10-26 ENCOUNTER — HOSPITAL ENCOUNTER (OUTPATIENT)
Age: 63
Discharge: HOME | End: 2018-10-26
Payer: COMMERCIAL

## 2018-10-26 DIAGNOSIS — I15.2 HYPERTENSION DUE TO ENDOCRINE DISORDER: ICD-10-CM

## 2018-10-26 DIAGNOSIS — E26.09 PRIMARY HYPERALDOSTERONISM (HCC): ICD-10-CM

## 2018-10-26 DIAGNOSIS — R89.9 ABNORMAL LABORATORY TEST: ICD-10-CM

## 2018-10-26 DIAGNOSIS — Z87.19 HISTORY OF PANCREATITIS: ICD-10-CM

## 2018-10-26 DIAGNOSIS — D64.9 ANEMIA, UNSPECIFIED TYPE: ICD-10-CM

## 2018-10-26 DIAGNOSIS — R73.01 IFG (IMPAIRED FASTING GLUCOSE): ICD-10-CM

## 2018-10-26 DIAGNOSIS — E78.5 HYPERLIPIDEMIA WITH TARGET LDL LESS THAN 100: ICD-10-CM

## 2018-10-26 LAB
ALBUMIN SERPL-MCNC: 4.1 G/DL (ref 3.5–5.1)
ALP BLD-CCNC: 59 U/L (ref 38–126)
ALT SERPL-CCNC: 28 U/L (ref 11–66)
ANION GAP SERPL CALCULATED.3IONS-SCNC: 12 MEQ/L (ref 8–16)
AST SERPL-CCNC: 25 U/L (ref 5–40)
AVERAGE GLUCOSE: 105 MG/DL (ref 70–126)
BILIRUB SERPL-MCNC: 1.3 MG/DL (ref 0.3–1.2)
BUN BLDV-MCNC: 15 MG/DL (ref 7–22)
CA 19-9: 15 U/ML (ref 0–35)
CALCIUM SERPL-MCNC: 9.3 MG/DL (ref 8.5–10.5)
CHLORIDE BLD-SCNC: 98 MEQ/L (ref 98–111)
CHOLESTEROL, TOTAL: 135 MG/DL (ref 100–199)
CO2: 30 MEQ/L (ref 23–33)
CREAT SERPL-MCNC: 1.2 MG/DL (ref 0.4–1.2)
GFR SERPL CREATININE-BSD FRML MDRD: 61 ML/MIN/1.73M2
GLUCOSE BLD-MCNC: 104 MG/DL (ref 70–108)
HBA1C MFR BLD: 5.5 % (ref 4.4–6.4)
HCT VFR BLD CALC: 41 % (ref 42–52)
HDLC SERPL-MCNC: 41 MG/DL
HEMOGLOBIN: 13.7 GM/DL (ref 14–18)
LDL CHOLESTEROL CALCULATED: 68 MG/DL
POTASSIUM SERPL-SCNC: 3.5 MEQ/L (ref 3.5–5.2)
SODIUM BLD-SCNC: 140 MEQ/L (ref 135–145)
TOTAL PROTEIN: 7.3 G/DL (ref 6.1–8)
TRIGL SERPL-MCNC: 132 MG/DL (ref 0–199)

## 2018-10-26 PROCEDURE — 85018 HEMOGLOBIN: CPT

## 2018-10-26 PROCEDURE — 85014 HEMATOCRIT: CPT

## 2018-10-26 PROCEDURE — 83036 HEMOGLOBIN GLYCOSYLATED A1C: CPT

## 2018-10-26 PROCEDURE — 86301 IMMUNOASSAY TUMOR CA 19-9: CPT

## 2018-10-26 PROCEDURE — 36415 COLL VENOUS BLD VENIPUNCTURE: CPT

## 2018-10-26 PROCEDURE — 80053 COMPREHEN METABOLIC PANEL: CPT

## 2018-10-26 PROCEDURE — 80061 LIPID PANEL: CPT

## 2018-10-29 ENCOUNTER — OFFICE VISIT (OUTPATIENT)
Dept: FAMILY MEDICINE CLINIC | Age: 63
End: 2018-10-29
Payer: COMMERCIAL

## 2018-10-29 VITALS
DIASTOLIC BLOOD PRESSURE: 86 MMHG | BODY MASS INDEX: 31.05 KG/M2 | SYSTOLIC BLOOD PRESSURE: 122 MMHG | RESPIRATION RATE: 20 BRPM | HEART RATE: 76 BPM | TEMPERATURE: 98.6 F | WEIGHT: 222.6 LBS

## 2018-10-29 DIAGNOSIS — Z12.5 SCREENING PSA (PROSTATE SPECIFIC ANTIGEN): ICD-10-CM

## 2018-10-29 DIAGNOSIS — I15.2 HYPERTENSION DUE TO ENDOCRINE DISORDER: ICD-10-CM

## 2018-10-29 DIAGNOSIS — K21.9 GASTROESOPHAGEAL REFLUX DISEASE WITHOUT ESOPHAGITIS: ICD-10-CM

## 2018-10-29 DIAGNOSIS — E78.5 HYPERLIPIDEMIA WITH TARGET LDL LESS THAN 100: ICD-10-CM

## 2018-10-29 DIAGNOSIS — R73.01 IFG (IMPAIRED FASTING GLUCOSE): Primary | ICD-10-CM

## 2018-10-29 DIAGNOSIS — D64.9 ANEMIA, UNSPECIFIED TYPE: ICD-10-CM

## 2018-10-29 DIAGNOSIS — F41.9 ANXIETY: ICD-10-CM

## 2018-10-29 PROBLEM — K85.90 ACUTE PANCREATITIS WITHOUT NECROSIS OR INFECTION, UNSPECIFIED: Status: RESOLVED | Noted: 2018-07-16 | Resolved: 2018-10-29

## 2018-10-29 PROBLEM — E87.20 LACTIC ACIDOSIS: Status: RESOLVED | Noted: 2018-07-16 | Resolved: 2018-10-29

## 2018-10-29 PROBLEM — E87.3 METABOLIC ALKALOSIS: Status: RESOLVED | Noted: 2018-05-01 | Resolved: 2018-10-29

## 2018-10-29 PROCEDURE — 99214 OFFICE O/P EST MOD 30 MIN: CPT | Performed by: FAMILY MEDICINE

## 2018-10-29 RX ORDER — ROSUVASTATIN CALCIUM 10 MG/1
10 TABLET, COATED ORAL DAILY
Qty: 90 TABLET | Refills: 3 | Status: SHIPPED | OUTPATIENT
Start: 2018-10-29 | End: 2019-01-21 | Stop reason: ALTCHOICE

## 2018-10-29 ASSESSMENT — ENCOUNTER SYMPTOMS
VOMITING: 0
ABDOMINAL PAIN: 0
CONSTIPATION: 0
CHEST TIGHTNESS: 0
SHORTNESS OF BREATH: 0
BLOOD IN STOOL: 0
NAUSEA: 0
ANAL BLEEDING: 0
DIARRHEA: 0

## 2018-10-29 NOTE — PATIENT INSTRUCTIONS
Encouraged annual FLU VACCINE- pt declines (updated 10/29/2018). [address] #2 due between 12/10/2018-4/10/2018  COLONOSCOPY done 4/28/2018 per Dr. Yudelka Lynch- + Polyps- To do again in 2023   [address] done annually per Michela Morales- to follow up soon with Dean Hadley. Home BP's- call if > 140/90 on regular basis. Follow up as scheduled with Dr. Jessica Fishman 12/13/2018. Encouraged increase water intake at > 80 ounces daily. Continue Crestor 10 mg- 1 pill daily (#90/3  Refill) as your current cholesterol numbers are great! Continue to work on diet, exercise, and weight loss for optimal cardiovascular health. Check CMP, H/H, and PSA in 6 months. Continue current medicines  Refill Crestor  Follow up in 6 months.

## 2018-12-11 ENCOUNTER — HOSPITAL ENCOUNTER (OUTPATIENT)
Age: 63
Discharge: HOME | End: 2018-12-11
Payer: COMMERCIAL

## 2018-12-11 DIAGNOSIS — E26.9 HYPERALDOSTERONISM (HCC): ICD-10-CM

## 2019-01-10 ENCOUNTER — HOSPITAL ENCOUNTER (OUTPATIENT)
Age: 64
Discharge: HOME | End: 2019-01-10
Payer: COMMERCIAL

## 2019-01-10 LAB
CORTISOL COLLECTION INFO: NORMAL
CORTISOL: 8.94 UG/DL

## 2019-01-10 PROCEDURE — 82533 TOTAL CORTISOL: CPT

## 2019-01-10 PROCEDURE — 82088 ASSAY OF ALDOSTERONE: CPT

## 2019-01-10 PROCEDURE — 84244 ASSAY OF RENIN: CPT

## 2019-01-10 PROCEDURE — 82384 ASSAY THREE CATECHOLAMINES: CPT

## 2019-01-10 PROCEDURE — 36415 COLL VENOUS BLD VENIPUNCTURE: CPT

## 2019-01-13 LAB
ALDOSTERONE: 36.9 NG/DL
RENIN ACTIVITY: 0.6 NG/ML/HR

## 2019-01-18 LAB — CATECHOLAMINES FRACT FREE PLASMA: NORMAL

## 2019-01-21 ENCOUNTER — OFFICE VISIT (OUTPATIENT)
Dept: NEPHROLOGY | Age: 64
End: 2019-01-21
Payer: COMMERCIAL

## 2019-01-21 ENCOUNTER — HOSPITAL ENCOUNTER (OUTPATIENT)
Age: 64
Discharge: HOME | End: 2019-01-21
Payer: COMMERCIAL

## 2019-01-21 VITALS
SYSTOLIC BLOOD PRESSURE: 137 MMHG | WEIGHT: 223.4 LBS | OXYGEN SATURATION: 99 % | BODY MASS INDEX: 34.99 KG/M2 | HEART RATE: 68 BPM | DIASTOLIC BLOOD PRESSURE: 97 MMHG

## 2019-01-21 DIAGNOSIS — I15.2 HYPERTENSION DUE TO ENDOCRINE DISORDER: Primary | ICD-10-CM

## 2019-01-21 DIAGNOSIS — E87.6 HYPOKALEMIA: Primary | ICD-10-CM

## 2019-01-21 DIAGNOSIS — N18.2 CKD (CHRONIC KIDNEY DISEASE), STAGE II: ICD-10-CM

## 2019-01-21 DIAGNOSIS — I15.2 HYPERTENSION DUE TO ENDOCRINE DISORDER: ICD-10-CM

## 2019-01-21 DIAGNOSIS — F41.9 ANXIETY: ICD-10-CM

## 2019-01-21 LAB
ANION GAP SERPL CALCULATED.3IONS-SCNC: 14 MEQ/L (ref 8–16)
BUN BLDV-MCNC: 22 MG/DL (ref 7–22)
CALCIUM SERPL-MCNC: 9.6 MG/DL (ref 8.5–10.5)
CHLORIDE BLD-SCNC: 99 MEQ/L (ref 98–111)
CO2: 31 MEQ/L (ref 23–33)
CREAT SERPL-MCNC: 1.4 MG/DL (ref 0.4–1.2)
GFR SERPL CREATININE-BSD FRML MDRD: 51 ML/MIN/1.73M2
GLUCOSE BLD-MCNC: 115 MG/DL (ref 70–108)
MAGNESIUM: 2.1 MG/DL (ref 1.6–2.4)
POTASSIUM SERPL-SCNC: 2.9 MEQ/L (ref 3.5–5.2)
SODIUM BLD-SCNC: 144 MEQ/L (ref 135–145)

## 2019-01-21 PROCEDURE — 99214 OFFICE O/P EST MOD 30 MIN: CPT | Performed by: INTERNAL MEDICINE

## 2019-01-21 PROCEDURE — 36415 COLL VENOUS BLD VENIPUNCTURE: CPT

## 2019-01-21 PROCEDURE — 80048 BASIC METABOLIC PNL TOTAL CA: CPT

## 2019-01-21 PROCEDURE — 83735 ASSAY OF MAGNESIUM: CPT

## 2019-01-21 RX ORDER — POTASSIUM CHLORIDE 20 MEQ/1
40 TABLET, EXTENDED RELEASE ORAL DAILY
Qty: 180 TABLET | Refills: 1 | Status: SHIPPED | OUTPATIENT
Start: 2019-01-21 | End: 2020-06-26 | Stop reason: SDUPTHER

## 2019-01-25 ENCOUNTER — HOSPITAL ENCOUNTER (OUTPATIENT)
Age: 64
Discharge: HOME | End: 2019-01-25
Payer: COMMERCIAL

## 2019-01-25 DIAGNOSIS — E87.6 HYPOKALEMIA: ICD-10-CM

## 2019-01-25 LAB — POTASSIUM SERPL-SCNC: 3.5 MEQ/L (ref 3.5–5.2)

## 2019-01-25 PROCEDURE — 84132 ASSAY OF SERUM POTASSIUM: CPT

## 2019-01-25 PROCEDURE — 36415 COLL VENOUS BLD VENIPUNCTURE: CPT

## 2019-01-28 ENCOUNTER — TELEPHONE (OUTPATIENT)
Dept: NEPHROLOGY | Age: 64
End: 2019-01-28

## 2019-02-23 DIAGNOSIS — I15.2 HYPERTENSION DUE TO ENDOCRINE DISORDER: ICD-10-CM

## 2019-02-23 DIAGNOSIS — F41.9 ANXIETY: ICD-10-CM

## 2019-02-25 RX ORDER — PAROXETINE HYDROCHLORIDE 20 MG/1
TABLET, FILM COATED ORAL
Qty: 90 TABLET | Refills: 3 | Status: SHIPPED | OUTPATIENT
Start: 2019-02-25 | End: 2020-06-26 | Stop reason: SDUPTHER

## 2019-02-25 RX ORDER — EPLERENONE 50 MG/1
TABLET, FILM COATED ORAL
Qty: 90 TABLET | Refills: 3 | Status: SHIPPED | OUTPATIENT
Start: 2019-02-25 | End: 2019-06-11 | Stop reason: ALTCHOICE

## 2019-02-26 RX ORDER — CHLORTHALIDONE 50 MG/1
50 TABLET ORAL DAILY
Qty: 30 TABLET | Refills: 11 | Status: SHIPPED | OUTPATIENT
Start: 2019-02-26 | End: 2020-06-26 | Stop reason: SDUPTHER

## 2019-02-26 RX ORDER — AMLODIPINE BESYLATE 10 MG/1
10 TABLET ORAL DAILY
Qty: 30 TABLET | Refills: 5 | Status: SHIPPED | OUTPATIENT
Start: 2019-02-26 | End: 2020-06-26 | Stop reason: SDUPTHER

## 2019-05-07 LAB
AVERAGE GLUCOSE: NORMAL
HBA1C MFR BLD: 6.2 %

## 2019-05-21 ENCOUNTER — TELEPHONE (OUTPATIENT)
Dept: FAMILY MEDICINE CLINIC | Age: 64
End: 2019-05-21

## 2019-05-30 ENCOUNTER — HOSPITAL ENCOUNTER (EMERGENCY)
Age: 64
Discharge: HOME | End: 2019-05-30
Payer: COMMERCIAL

## 2019-05-30 VITALS
SYSTOLIC BLOOD PRESSURE: 152 MMHG | WEIGHT: 224 LBS | DIASTOLIC BLOOD PRESSURE: 93 MMHG | RESPIRATION RATE: 18 BRPM | TEMPERATURE: 98.1 F | BODY MASS INDEX: 35.08 KG/M2 | OXYGEN SATURATION: 98 % | HEART RATE: 73 BPM

## 2019-05-30 DIAGNOSIS — J06.9 UPPER RESPIRATORY TRACT INFECTION, UNSPECIFIED TYPE: Primary | ICD-10-CM

## 2019-05-30 PROCEDURE — 99212 OFFICE O/P EST SF 10 MIN: CPT

## 2019-05-30 PROCEDURE — 99213 OFFICE O/P EST LOW 20 MIN: CPT | Performed by: NURSE PRACTITIONER

## 2019-05-30 RX ORDER — PREDNISONE 20 MG/1
40 TABLET ORAL DAILY
Qty: 14 TABLET | Refills: 0 | Status: SHIPPED | OUTPATIENT
Start: 2019-05-30 | End: 2019-06-06

## 2019-05-30 RX ORDER — BENZONATATE 100 MG/1
100 CAPSULE ORAL 3 TIMES DAILY PRN
Qty: 21 CAPSULE | Refills: 0 | Status: SHIPPED | OUTPATIENT
Start: 2019-05-30 | End: 2019-06-06

## 2019-05-30 RX ORDER — DEXTROMETHORPHAN HYDROBROMIDE AND PROMETHAZINE HYDROCHLORIDE 15; 6.25 MG/5ML; MG/5ML
5 SYRUP ORAL 4 TIMES DAILY PRN
Qty: 118 ML | Refills: 0 | Status: SHIPPED | OUTPATIENT
Start: 2019-05-30 | End: 2019-06-06

## 2019-05-30 ASSESSMENT — ENCOUNTER SYMPTOMS
SHORTNESS OF BREATH: 0
VOMITING: 0
SORE THROAT: 1
SINUS PRESSURE: 1
NAUSEA: 0
COUGH: 1

## 2019-05-30 ASSESSMENT — PAIN SCALES - GENERAL: PAINLEVEL_OUTOF10: 3

## 2019-05-30 ASSESSMENT — PAIN DESCRIPTION - PAIN TYPE: TYPE: ACUTE PAIN

## 2019-05-30 ASSESSMENT — PAIN DESCRIPTION - LOCATION: LOCATION: HEAD

## 2019-05-30 ASSESSMENT — PAIN DESCRIPTION - DESCRIPTORS: DESCRIPTORS: ACHING

## 2019-05-30 NOTE — ED TRIAGE NOTES
Pt walked to room 8. Pt here with complaints of a productive cough--clear. Tuesday woke up with sore throat. Pt also having problems with sinuses. Watery eyes, headache from coughing.

## 2019-05-30 NOTE — ED PROVIDER NOTES
[place] [address]  Urgent Care Encounter       CHIEF COMPLAINT       Chief Complaint   Patient presents with    Cough     Productive cough, sinus problems, headache. Started Tuesday. Nurses Notes reviewed and I agree except as noted in the HPI. HISTORY OF PRESENT ILLNESS   Sabino Chavez is a 60 y.o. male who presents for evaluation of cough, congestion, sore throat and sinus congestion that began yesterday. Patient denies any known fever or chills but does report a frontal headache. States he has not been taking any medications at home for this. The history is provided by the patient. REVIEW OF SYSTEMS     Review of Systems   Constitutional: Negative for chills and fever. HENT: Positive for congestion, sinus pressure and sore throat. Eyes: Negative for visual disturbance. Respiratory: Positive for cough. Negative for shortness of breath. Cardiovascular: Negative for chest pain. Gastrointestinal: Negative for nausea and vomiting. Musculoskeletal: Negative for arthralgias and myalgias. Skin: Negative for rash. Allergic/Immunologic: Negative for environmental allergies. Neurological: Positive for headaches. PAST MEDICAL HISTORY         Diagnosis Date    Gallbladder disease     GERD (gastroesophageal reflux disease)     HTN (hypertension)     Hyperaldosteronism (HCC)     Hypertension     Hypokalemia     Metabolic alkalosis     Prostate troubles        SURGICALHISTORY     Patient  has a past surgical history that includes skin biopsy (11/06/2012); Colonoscopy (05/2013); pr lap,cholecystectomy (N/A, 6/9/2018); Cholecystectomy; Colonoscopy (04/2018); and pr office/outpt visit,procedure only (N/A, 7/18/2018).     CURRENT MEDICATIONS       Previous Medications    ACETAMINOPHEN (TYLENOL) 325 MG TABLET    Take 650 mg by mouth every 6 hours as needed for Pain    AMLODIPINE (NORVASC) 10 MG TABLET    Take 1 tablet by mouth daily - if your top BP number is less than 110 do not take    CHLORTHALIDONE (HYGROTEN) 50 MG TABLET    Take 1 tablet by mouth daily    EPLERENONE (INSPRA) 50 MG TABLET    Take 1 tablet by mouth 2 times daily    EPLERENONE (INSPRA) 50 MG TABLET    TAKE ONE TABLET BY MOUTH ONCE DAILY    LABETALOL (TRANDATE) 300 MG TABLET    Take 0.5 tablets by mouth 2 times daily    PAROXETINE (PAXIL) 20 MG TABLET    TAKE ONE TABLET BY MOUTH IN THE MORNING    POTASSIUM CHLORIDE (KLOR-CON M) 20 MEQ EXTENDED RELEASE TABLET    Take 2 tablets by mouth daily       ALLERGIES     Patient is is allergic to lipitor [atorvastatin] and aldactone [spironolactone]. Patients   Immunization History   Administered Date(s) Administered    Influenza Virus Vaccine 10/10/2013    Tdap (Boostrix, Adacel) 05/01/2018    Zoster Live (Zostavax) 11/11/2013    Zoster Subunit (Shingrix) 10/10/2018       FAMILY HISTORY     Patient's family history includes Colon Polyps in his father and paternal uncle; Diabetes in his brother and father. SOCIAL HISTORY     Patient  reports that he has never smoked. He has never used smokeless tobacco. He reports that he does not drink alcohol or use drugs. PHYSICAL EXAM     ED TRIAGE VITALS  BP: (!) 152/93, Temp: 98.1 °F (36.7 °C), Pulse: 73, Resp: 18, SpO2: 98 %,Estimated body mass index is 35.08 kg/m² as calculated from the following:    Height as of 11/5/18: 5' 7\" (1.702 m). Weight as of this encounter: 224 lb (101.6 kg). ,No LMP for male patient. Physical Exam   Constitutional: He is oriented to person, place, and time. He appears well-developed and well-nourished. No distress. HENT:   Right Ear: Tympanic membrane is bulging. Tympanic membrane is not erythematous. Left Ear: Tympanic membrane is bulging. Tympanic membrane is not erythematous. Mouth/Throat: Oropharynx is clear and moist. Tonsils are 0 on the right. Tonsils are 0 on the left. No tonsillar exudate. Eyes: Right conjunctiva is not injected.  Left conjunctiva is not injected. Pupils are equal.   Neck: Normal range of motion. Cardiovascular: Normal rate and regular rhythm. No murmur heard. Pulmonary/Chest: Effort normal and breath sounds normal. No respiratory distress. Musculoskeletal:        Right knee: He exhibits normal range of motion. Left knee: He exhibits normal range of motion. Neurological: He is alert and oriented to person, place, and time. Skin: Skin is warm. No rash noted. He is not diaphoretic. Psychiatric: He has a normal mood and affect. His behavior is normal.       DIAGNOSTIC RESULTS     Labs:No results found for this visit on 05/30/19. IMAGING:    No orders to display         EKG: none      URGENT CARE COURSE:     Vitals:    05/30/19 1745   BP: (!) 152/93   Pulse: 73   Resp: 18   Temp: 98.1 °F (36.7 °C)   TempSrc: Temporal   SpO2: 98%   Weight: 224 lb (101.6 kg)       Medications - No data to display         PROCEDURES:  None    FINAL IMPRESSION      1. Upper respiratory tract infection, unspecified type          DISPOSITION/ PLAN       Exam is consistent with an upper respiratory infection at this time. I discussed with the patient that we will plan to treat symptomatically with prednisone and Coricidin with Mucinex. He is advised to hydrate at home and he'll be given a prescription for promethazine DM and Tessalon Perles for cough management. He is agreeable plan as discussed.     PATIENT REFERRED TO:  Brooke Patel MD  [address] [address]      DISCHARGE MEDICATIONS:  New Prescriptions    BENZONATATE (TESSALON PERLES) 100 MG CAPSULE    Take 1 capsule by mouth 3 times daily as needed for Cough    DEXTROMETHORPHAN-GUAIFENESIN (CORICIDIN HBP CONGESTION/COUGH)  MG CAPS    Take 1 capsule by mouth 2 times daily as needed (cough/congestion)    PREDNISONE (DELTASONE) 20 MG TABLET    Take 2 tablets by mouth daily for 7 days    PROMETHAZINE-DEXTROMETHORPHAN (PROMETHAZINE-DM) 6.25-15 MG/5ML SYRUP    Take 5 mLs by mouth 4 times daily as needed for Cough       Discontinued Medications    No medications on file       Current Discharge Medication List          ANNE MARIE Cardenas CNP    (Please note that portions of this note were completed with a voice recognition program. Efforts were made to edit the dictations but occasionally words are mis-transcribed.)          ANNE MARIE Cardenas CNP  05/30/19 0496

## 2019-05-30 NOTE — ED NOTES
Pt. Released in stable condition, ambulated per self to private car. Instructed pt to follow-up with family doctor as needed for recheck or go directly to the emergency department for any concerns/worsening conditions. Pt. Verbalized understanding of instructions. No questions at this time. RX in hand.       Vasu Luque RN  05/30/19 0185

## 2019-06-03 ENCOUNTER — TELEPHONE (OUTPATIENT)
Dept: FAMILY MEDICINE CLINIC | Age: 64
End: 2019-06-03

## 2019-06-07 ENCOUNTER — NURSE ONLY (OUTPATIENT)
Dept: LAB | Age: 64
End: 2019-06-07

## 2019-06-07 DIAGNOSIS — Z12.5 SCREENING PSA (PROSTATE SPECIFIC ANTIGEN): ICD-10-CM

## 2019-06-07 DIAGNOSIS — D64.9 ANEMIA, UNSPECIFIED TYPE: ICD-10-CM

## 2019-06-07 DIAGNOSIS — E78.5 HYPERLIPIDEMIA WITH TARGET LDL LESS THAN 100: ICD-10-CM

## 2019-06-07 DIAGNOSIS — I15.2 HYPERTENSION DUE TO ENDOCRINE DISORDER: ICD-10-CM

## 2019-06-07 LAB
ALBUMIN SERPL-MCNC: 4.2 G/DL (ref 3.5–5.1)
ALP BLD-CCNC: 64 U/L (ref 38–126)
ALT SERPL-CCNC: 35 U/L (ref 11–66)
ANION GAP SERPL CALCULATED.3IONS-SCNC: 12 MEQ/L (ref 8–16)
AST SERPL-CCNC: 27 U/L (ref 5–40)
BILIRUB SERPL-MCNC: 1.4 MG/DL (ref 0.3–1.2)
BUN BLDV-MCNC: 18 MG/DL (ref 7–22)
CALCIUM SERPL-MCNC: 9.6 MG/DL (ref 8.5–10.5)
CHLORIDE BLD-SCNC: 96 MEQ/L (ref 98–111)
CO2: 31 MEQ/L (ref 23–33)
CREAT SERPL-MCNC: 1.4 MG/DL (ref 0.4–1.2)
GFR SERPL CREATININE-BSD FRML MDRD: 51 ML/MIN/1.73M2
GLUCOSE BLD-MCNC: 112 MG/DL (ref 70–108)
HCT VFR BLD CALC: 44.2 % (ref 42–52)
HEMOGLOBIN: 14.9 GM/DL (ref 14–18)
POTASSIUM SERPL-SCNC: 3.5 MEQ/L (ref 3.5–5.2)
PROSTATE SPECIFIC ANTIGEN: 0.75 NG/ML (ref 0–1)
SODIUM BLD-SCNC: 139 MEQ/L (ref 135–145)
TOTAL PROTEIN: 7.8 G/DL (ref 6.1–8)

## 2019-06-11 ENCOUNTER — OFFICE VISIT (OUTPATIENT)
Dept: FAMILY MEDICINE CLINIC | Age: 64
End: 2019-06-11
Payer: COMMERCIAL

## 2019-06-11 VITALS
DIASTOLIC BLOOD PRESSURE: 86 MMHG | SYSTOLIC BLOOD PRESSURE: 132 MMHG | HEART RATE: 72 BPM | WEIGHT: 226.8 LBS | BODY MASS INDEX: 35.52 KG/M2 | TEMPERATURE: 98.1 F | RESPIRATION RATE: 14 BRPM

## 2019-06-11 DIAGNOSIS — R73.01 IFG (IMPAIRED FASTING GLUCOSE): ICD-10-CM

## 2019-06-11 DIAGNOSIS — E26.09 PRIMARY HYPERALDOSTERONISM (HCC): ICD-10-CM

## 2019-06-11 DIAGNOSIS — E26.9 HYPERALDOSTERONISM (HCC): ICD-10-CM

## 2019-06-11 DIAGNOSIS — K21.9 GASTROESOPHAGEAL REFLUX DISEASE WITHOUT ESOPHAGITIS: ICD-10-CM

## 2019-06-11 DIAGNOSIS — E78.5 HYPERLIPIDEMIA WITH TARGET LDL LESS THAN 100: ICD-10-CM

## 2019-06-11 DIAGNOSIS — F41.9 ANXIETY: ICD-10-CM

## 2019-06-11 DIAGNOSIS — I15.2 HYPERTENSION DUE TO ENDOCRINE DISORDER: ICD-10-CM

## 2019-06-11 DIAGNOSIS — Z00.00 WELL ADULT EXAM: Primary | ICD-10-CM

## 2019-06-11 PROCEDURE — 99396 PREV VISIT EST AGE 40-64: CPT | Performed by: FAMILY MEDICINE

## 2019-06-11 RX ORDER — LABETALOL 300 MG/1
150 TABLET, FILM COATED ORAL DAILY
Qty: 45 TABLET | Refills: 3 | Status: SHIPPED | OUTPATIENT
Start: 2019-06-11 | End: 2020-06-26 | Stop reason: SDUPTHER

## 2019-06-11 ASSESSMENT — PATIENT HEALTH QUESTIONNAIRE - PHQ9
SUM OF ALL RESPONSES TO PHQ9 QUESTIONS 1 & 2: 0
SUM OF ALL RESPONSES TO PHQ QUESTIONS 1-9: 0
SUM OF ALL RESPONSES TO PHQ QUESTIONS 1-9: 0
1. LITTLE INTEREST OR PLEASURE IN DOING THINGS: 0
2. FEELING DOWN, DEPRESSED OR HOPELESS: 0

## 2019-06-11 ASSESSMENT — ENCOUNTER SYMPTOMS
DIARRHEA: 0
CONSTIPATION: 0
ANAL BLEEDING: 0
SHORTNESS OF BREATH: 1
VOMITING: 0
BLOOD IN STOOL: 0
ABDOMINAL PAIN: 0
CHEST TIGHTNESS: 0
NAUSEA: 0

## 2019-06-11 NOTE — PROGRESS NOTES
[facility]  [facility]  Dept: [phone]  Dept Fax: [phone]    SUBJECTIVE     Babar Noriega is a 60 y.o.male    Pt presents for annual wellness physical exam.      Pt stepped out of room and felt BP was starting to go low- BP checked per RN and noted to be 80/56- pt given water per RN and recheck /72. Accu check was 1256 in office at this time. Pt started feeling better with laying down and hydration. Pt states he only had banana and \"a little bit of water\" this morning prior to appt. The home BP readings have been in the 120-130's / 90's range usually. Checking weekly at this time    Sleep-OK usually, decreased over past 2-3 nights. Interest- OK  Guilt- OK  Energy- decreased overall, pt working on getting stamina back- not exercising regularly. Concentration- OK   Appetite- OK  Psychomotor Retardation- NO  Suicidal/ Homicidal Ideations- NO  Anxiety-OK  Libido- OK  Employer? Lost work days? 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 Pt feeling better at this time. Weight increased 4# since last visit 10/29/2018    Patient Active Problem List   Diagnosis    Colon polyps    GERD (gastroesophageal reflux disease)    Hyperlipidemia with target LDL less than 100    Anxiety    Hypertension due to endocrine disorder    IFG (impaired fasting glucose)    [facility]- Dr. Mckay Waters S/P laparoscopic cholecystectomy- 6/9/2018- Dr. Catarino Rhodes Diastolic dysfunction without heart failure    CKD (chronic kidney disease), stage II    Essential hypertension     Review of Systems   Constitutional: Positive for diaphoresis (with BP dropping occasionally) and fatigue. Negative for chills, fever and unexpected weight change. Eyes: Negative for visual disturbance.    Respiratory: Positive for shortness of breath (occasional, no associated cardiac symptoms, no wheezing, ? deconditioning. ). Negative for chest tightness. Cardiovascular: Negative for chest pain, palpitations and leg swelling. Gastrointestinal: Negative for abdominal pain, anal bleeding, blood in stool, constipation, diarrhea, nausea and vomiting. Genitourinary: Negative for dysuria and hematuria. Musculoskeletal: Negative for neck pain. Neurological: Positive for light-headedness (with standing up this past weekend- no BP checked. ). Negative for dizziness and headaches. OBJECTIVE     /86   Pulse 72   Temp 98.1 °F (36.7 °C) (Oral)   Resp 14   Wt 226 lb 12.8 oz (102.9 kg)   BMI 35.52 kg/m²     Wt Readings from Last 3 Encounters:   06/11/19 226 lb 12.8 oz (102.9 kg)   05/30/19 224 lb (101.6 kg)   01/21/19 223 lb 6.4 oz (101.3 kg)       Physical Exam   Constitutional: He is oriented to person, place, and time. He appears well-developed and well-nourished. HENT:   Head: Normocephalic and atraumatic. Right Ear: External ear normal.   Left Ear: External ear normal.   Nose: Nose normal.   Mouth/Throat: Oropharynx is clear and moist.   Eyes: Pupils are equal, round, and reactive to light. Conjunctivae and EOM are normal.   Neck: Normal range of motion. Neck supple. Cardiovascular: Normal rate, regular rhythm and intact distal pulses. Pulmonary/Chest: Effort normal and breath sounds normal.   Abdominal: Soft. Bowel sounds are normal.   Genitourinary:   Genitourinary Comments: KEYSHA done 4/28/2018 per Dr. Arie Leyva- Normal Rectum. ES   Musculoskeletal: Normal range of motion. Neurological: He is alert and oriented to person, place, and time. He has normal reflexes. Skin: Skin is warm and dry. Psychiatric: He has a normal mood and affect. His behavior is normal. Judgment and thought content normal.   Nursing note and vitals reviewed.     Component      Latest Ref Rng & Units 6/7/2019 5/7/2019   Glucose      70 - 108 mg/dL 112 (H)    Creatinine      0.4 - 1.2 mg/dL Specific Ag      0.00 - 1.00 ng/ml 0.90 1.87 0.92 1.41     Lab Results   Component Value Date    TSH 3.480 06/06/2018    T4FREE 0.94 07/27/2013     Lab Results   Component Value Date    WBC 5.7 09/06/2018    HGB 14.9 06/07/2019    HCT 44.2 06/07/2019    MCV 86.9 09/06/2018     09/06/2018       Immunization History   Administered Date(s) Administered    Influenza Virus Vaccine 10/10/2013    Tdap (Boostrix, Adacel) 05/01/2018    Zoster Live (Zostavax) 11/11/2013    Zoster Subunit (Shingrix) 10/10/2018     Health Maintenance   Topic Date Due    Shingles Vaccine (3 of 3) 12/05/2018    Flu vaccine (Season Ended) 09/18/2019 (Originally 9/1/2019)    HIV screen  09/18/2019 (Originally 3/15/1970)    A1C test (Diabetic or Prediabetic)  10/26/2019    Potassium monitoring  06/07/2020    Creatinine monitoring  06/07/2020    Colon cancer screen colonoscopy  04/28/2021    Lipid screen  10/26/2023    DTaP/Tdap/Td vaccine (2 - Td) 05/01/2028    Hepatitis C screen  Completed    Pneumococcal 0-64 years Vaccine  Aged Out       AAA ultrasound (Male, 65-75, smoked ever) indicated at this time? NO, no tobacco history  CT Lung Screen (55-80, 30 pk-yrs, smoking or quit <15 years) indicated at this time? NO, no tobacco history       Future Appointments   Date Time Provider [facility]   7/22/2019  9:00 AM Lupe Oconnor MD [facility]   12/17/2019  8:15 AM Kenzie Cyr MD [address] [place] [address]         ASSESSMENT       Diagnosis Orders   1. Well adult exam     2. IFG (impaired fasting glucose)  Hemoglobin A1C    Microalbumin / Creatinine Urine Ratio    T4, Free    TSH without Reflex   3. Hypertension due to endocrine disorder  labetalol (TRANDATE) 300 MG tablet    T4, Free    TSH without Reflex   4. Hyperaldosteronism- Dr. Lea Coelho     5. Hyperlipidemia with target LDL less than 100  Lipid Panel    Hepatic Function Panel   6. Anxiety     7. Gastroesophageal reflux disease without esophagitis     8. Primary hyperaldosteronism ([facility]- Dr. Brenda Smart      Encouraged annual FLU VACCINE- pt declines (updated 6/11/2019).    SHINGRIX #2 overdue at this time- please get per [facility] on [employer]  COLONOSCOPY done 4/28/2018 per Dr. Hector Whitfield- + Polyps- To do again in 2023 (updated 6/11/2019)  [address] done annually per Clayton Tuttle- completed 3/2019- all ok- to follow up annually. (updated 6/11/2019)    Home BP's- call if > 140/90 on regular basis- after discussion with pt, will hold on decreasing BP meds as BP typically 130-140/90. Please check BP 3-5x/week and keep Dr. Batsheva Jackson as well as this office updated. Follow up as scheduled with Dr. Batsheva Jackson 7/22/2019. Encouraged increase water intake at > 96 ounces daily. Continue to work on diet, exercise (30 minutes, 5x/week), and weight loss for optimal cardiovascular health. Check HGA1C, FLP,  LFT's, SPOT MA, and TSH/FREE T4 in 6 months. Will hold on recheck BMP from this office as closely monitored by Dr. Batsheva Jackson. Continue current medicines  Refill Trandate  Follow up in 6 months.      Electronically signed Melly Strange MD on 6/11/2019 at 11:44 AM

## 2019-06-11 NOTE — PATIENT INSTRUCTIONS
Encouraged annual FLU VACCINE- pt declines (updated 6/11/2019).    SHINGRIX #2 overdue at this time- please get per [employer] on [employer]  COLONOSCOPY done 4/28/2018 per Dr. Arlette Martínez- + Polyps- To do again in 2023 (updated 6/11/2019)  [address] done annually per Mikayla Rivera- completed 3/2019- all ok- to follow up annually. (updated 6/11/2019)    Home BP's- call if > 140/90 on regular basis- after discussion with pt, will hold on decreasing BP meds as BP typically 130-140/90. Please check BP 3-5x/week and keep Dr. Olinda Feldman as well as this office updated. Follow up as scheduled with Dr. Olinda Feldman 7/22/2019. Encouraged increase water intake at > 96 ounces daily. Continue to work on diet, exercise (30 minutes, 5x/week), and weight loss for optimal cardiovascular health. Check HGA1C, FLP,  LFT's, SPOT MA, and TSH/FREE T4 in 6 months. Will hold on recheck BMP from this office as closely monitored by Dr. Olinda Feldman. Continue current medicines  Refill Trandate  Follow up in 6 months.

## 2019-06-21 ENCOUNTER — NURSE ONLY (OUTPATIENT)
Dept: LAB | Age: 64
End: 2019-06-21

## 2019-06-21 LAB
ANION GAP SERPL CALCULATED.3IONS-SCNC: 14 MEQ/L (ref 8–16)
BUN BLDV-MCNC: 12 MG/DL (ref 7–22)
CALCIUM SERPL-MCNC: 10 MG/DL (ref 8.5–10.5)
CHLORIDE BLD-SCNC: 98 MEQ/L (ref 98–111)
CO2: 30 MEQ/L (ref 23–33)
CREAT SERPL-MCNC: 1.1 MG/DL (ref 0.4–1.2)
GFR SERPL CREATININE-BSD FRML MDRD: 68 ML/MIN/1.73M2
GLUCOSE BLD-MCNC: 115 MG/DL (ref 70–108)
POTASSIUM SERPL-SCNC: 3.9 MEQ/L (ref 3.5–5.2)
SODIUM BLD-SCNC: 142 MEQ/L (ref 135–145)

## 2019-06-25 ENCOUNTER — OFFICE VISIT (OUTPATIENT)
Dept: NEPHROLOGY | Age: 64
End: 2019-06-25
Payer: COMMERCIAL

## 2019-06-25 VITALS
WEIGHT: 226.5 LBS | OXYGEN SATURATION: 96 % | SYSTOLIC BLOOD PRESSURE: 144 MMHG | DIASTOLIC BLOOD PRESSURE: 93 MMHG | BODY MASS INDEX: 35.47 KG/M2 | HEART RATE: 64 BPM

## 2019-06-25 DIAGNOSIS — N18.2 CKD (CHRONIC KIDNEY DISEASE), STAGE II: ICD-10-CM

## 2019-06-25 DIAGNOSIS — I15.2 HYPERTENSION DUE TO ENDOCRINE DISORDER: Primary | ICD-10-CM

## 2019-06-25 DIAGNOSIS — E26.9 HYPERALDOSTERONISM (HCC): ICD-10-CM

## 2019-06-25 DIAGNOSIS — F41.9 ANXIETY: ICD-10-CM

## 2019-06-25 PROCEDURE — 99214 OFFICE O/P EST MOD 30 MIN: CPT | Performed by: INTERNAL MEDICINE

## 2019-06-25 NOTE — PATIENT INSTRUCTIONS
Take the inspra 1 tablet in the morning and one tablet in the evening   Take the chlorthalidone in the afternoon  Take labetalol in the evening

## 2019-07-16 ENCOUNTER — TELEPHONE (OUTPATIENT)
Dept: FAMILY MEDICINE CLINIC | Age: 64
End: 2019-07-16

## 2019-07-16 ENCOUNTER — NURSE ONLY (OUTPATIENT)
Dept: LAB | Age: 64
End: 2019-07-16

## 2019-07-16 DIAGNOSIS — M54.5 LOW BACK PAIN, UNSPECIFIED BACK PAIN LATERALITY, UNSPECIFIED CHRONICITY, WITH SCIATICA PRESENCE UNSPECIFIED: Primary | ICD-10-CM

## 2019-07-16 DIAGNOSIS — R39.89 URINE DISCOLORATION: ICD-10-CM

## 2019-07-16 DIAGNOSIS — M54.5 LOW BACK PAIN, UNSPECIFIED BACK PAIN LATERALITY, UNSPECIFIED CHRONICITY, WITH SCIATICA PRESENCE UNSPECIFIED: ICD-10-CM

## 2019-07-16 LAB
BACTERIA: ABNORMAL /HPF
BILIRUBIN URINE: NEGATIVE
BLOOD, URINE: NEGATIVE
CASTS 2: ABNORMAL /LPF
CASTS UA: ABNORMAL /LPF
CHARACTER, URINE: CLEAR
COLOR: YELLOW
CRYSTALS, UA: ABNORMAL
EPITHELIAL CELLS, UA: ABNORMAL /HPF
GLUCOSE URINE: NEGATIVE MG/DL
KETONES, URINE: NEGATIVE
LEUKOCYTE ESTERASE, URINE: ABNORMAL
MISCELLANEOUS 2: ABNORMAL
NITRITE, URINE: NEGATIVE
PH UA: 7.5 (ref 5–9)
PROTEIN UA: 30
RBC URINE: ABNORMAL /HPF
RENAL EPITHELIAL, UA: ABNORMAL
SPECIFIC GRAVITY, URINE: 1.03 (ref 1–1.03)
UROBILINOGEN, URINE: 1 EU/DL (ref 0–1)
WBC UA: ABNORMAL /HPF
YEAST: ABNORMAL

## 2019-12-12 ENCOUNTER — NURSE ONLY (OUTPATIENT)
Dept: LAB | Age: 64
End: 2019-12-12

## 2019-12-12 DIAGNOSIS — I15.2 HYPERTENSION DUE TO ENDOCRINE DISORDER: ICD-10-CM

## 2019-12-12 DIAGNOSIS — R73.01 IFG (IMPAIRED FASTING GLUCOSE): ICD-10-CM

## 2019-12-12 DIAGNOSIS — E78.5 HYPERLIPIDEMIA WITH TARGET LDL LESS THAN 100: ICD-10-CM

## 2019-12-12 DIAGNOSIS — N18.2 CKD (CHRONIC KIDNEY DISEASE), STAGE II: ICD-10-CM

## 2019-12-12 LAB
ALBUMIN SERPL-MCNC: 4.2 G/DL (ref 3.5–5.1)
ALP BLD-CCNC: 59 U/L (ref 38–126)
ALT SERPL-CCNC: 41 U/L (ref 11–66)
ANION GAP SERPL CALCULATED.3IONS-SCNC: 15 MEQ/L (ref 8–16)
AST SERPL-CCNC: 32 U/L (ref 5–40)
AVERAGE GLUCOSE: 126 MG/DL (ref 70–126)
BILIRUB SERPL-MCNC: 0.9 MG/DL (ref 0.3–1.2)
BILIRUBIN DIRECT: < 0.2 MG/DL (ref 0–0.3)
BUN BLDV-MCNC: 18 MG/DL (ref 7–22)
CALCIUM SERPL-MCNC: 9.3 MG/DL (ref 8.5–10.5)
CHLORIDE BLD-SCNC: 102 MEQ/L (ref 98–111)
CHOLESTEROL, TOTAL: 141 MG/DL (ref 100–199)
CO2: 27 MEQ/L (ref 23–33)
CREAT SERPL-MCNC: 1.2 MG/DL (ref 0.4–1.2)
CREATININE, URINE: 223.7 MG/DL
GFR SERPL CREATININE-BSD FRML MDRD: 61 ML/MIN/1.73M2
GLUCOSE BLD-MCNC: 128 MG/DL (ref 70–108)
HBA1C MFR BLD: 6.2 % (ref 4.4–6.4)
HDLC SERPL-MCNC: 34 MG/DL
LDL CHOLESTEROL CALCULATED: 85 MG/DL
MICROALBUMIN UR-MCNC: 2.46 MG/DL
MICROALBUMIN/CREAT UR-RTO: 11 MG/G (ref 0–30)
POTASSIUM SERPL-SCNC: 3.5 MEQ/L (ref 3.5–5.2)
SODIUM BLD-SCNC: 144 MEQ/L (ref 135–145)
T4 FREE: 1.51 NG/DL (ref 0.93–1.76)
TOTAL PROTEIN: 7.2 G/DL (ref 6.1–8)
TRIGL SERPL-MCNC: 110 MG/DL (ref 0–199)
TSH SERPL DL<=0.05 MIU/L-ACNC: 1 UIU/ML (ref 0.4–4.2)

## 2019-12-17 ENCOUNTER — OFFICE VISIT (OUTPATIENT)
Dept: FAMILY MEDICINE CLINIC | Age: 64
End: 2019-12-17
Payer: COMMERCIAL

## 2019-12-17 VITALS
HEART RATE: 68 BPM | RESPIRATION RATE: 16 BRPM | WEIGHT: 224 LBS | TEMPERATURE: 97.5 F | DIASTOLIC BLOOD PRESSURE: 86 MMHG | BODY MASS INDEX: 35.08 KG/M2 | SYSTOLIC BLOOD PRESSURE: 120 MMHG

## 2019-12-17 DIAGNOSIS — E78.5 HYPERLIPIDEMIA WITH TARGET LDL LESS THAN 100: ICD-10-CM

## 2019-12-17 DIAGNOSIS — I15.2 HYPERTENSION DUE TO ENDOCRINE DISORDER: ICD-10-CM

## 2019-12-17 DIAGNOSIS — E26.9 HYPERALDOSTERONISM (HCC): ICD-10-CM

## 2019-12-17 DIAGNOSIS — F41.9 ANXIETY: ICD-10-CM

## 2019-12-17 DIAGNOSIS — R73.01 IFG (IMPAIRED FASTING GLUCOSE): Primary | ICD-10-CM

## 2019-12-17 DIAGNOSIS — Z12.5 SCREENING PSA (PROSTATE SPECIFIC ANTIGEN): ICD-10-CM

## 2019-12-17 DIAGNOSIS — K21.9 GASTROESOPHAGEAL REFLUX DISEASE WITHOUT ESOPHAGITIS: ICD-10-CM

## 2019-12-17 PROCEDURE — 99214 OFFICE O/P EST MOD 30 MIN: CPT | Performed by: FAMILY MEDICINE

## 2019-12-17 RX ORDER — HYDROXYZINE HYDROCHLORIDE 10 MG/1
10-20 TABLET, FILM COATED ORAL EVERY 6 HOURS PRN
Qty: 40 TABLET | Refills: 0 | Status: SHIPPED | OUTPATIENT
Start: 2019-12-17 | End: 2020-12-09 | Stop reason: SDUPTHER

## 2019-12-17 ASSESSMENT — ENCOUNTER SYMPTOMS
DIARRHEA: 0
BLOOD IN STOOL: 0
VOMITING: 0
ABDOMINAL PAIN: 0
NAUSEA: 0
ANAL BLEEDING: 0
SHORTNESS OF BREATH: 1
CHEST TIGHTNESS: 0
CONSTIPATION: 0

## 2020-01-16 ENCOUNTER — OFFICE VISIT (OUTPATIENT)
Dept: NEPHROLOGY | Age: 65
End: 2020-01-16
Payer: COMMERCIAL

## 2020-01-16 VITALS
HEART RATE: 64 BPM | BODY MASS INDEX: 35.55 KG/M2 | DIASTOLIC BLOOD PRESSURE: 91 MMHG | OXYGEN SATURATION: 96 % | WEIGHT: 227 LBS | SYSTOLIC BLOOD PRESSURE: 149 MMHG

## 2020-01-16 PROCEDURE — 99213 OFFICE O/P EST LOW 20 MIN: CPT | Performed by: INTERNAL MEDICINE

## 2020-01-16 RX ORDER — EPLERENONE 50 MG/1
50 TABLET, FILM COATED ORAL DAILY
COMMUNITY
End: 2020-06-26 | Stop reason: SDUPTHER

## 2020-01-16 NOTE — PROGRESS NOTES
Renal Progress Note    Assessment and Plan:      Diagnosis Orders   1. Essential hypertension     2. CKD (chronic kidney disease), stage II       PLAN:  I discussed my thoughts with the patient. He understood  Serum creatinine is mostly stable at 1.2 mg/dL. Medications reviewed. No changes. However, we will go to labetalol 150 mg twice a day from once a day if blood pressure control is an issue. Discussed with the patient. Return visit 6 months with labs. Patient Active Problem List   Diagnosis    Colon polyps    GERD (gastroesophageal reflux disease)    Hyperlipidemia with target LDL less than 100    Anxiety    Hypertension due to endocrine disorder    IFG (impaired fasting glucose)    Primary hyperaldosteronism ([facility]- Dr. Edwards Leader S/P laparoscopic cholecystectomy- 6/9/2018- Dr. Bartolome Felix Diastolic dysfunction without heart failure    CKD (chronic kidney disease), stage II    Essential hypertension       Subjective:   Chief complaint:  Chief Complaint   Patient presents with    Hypertension    Chronic Kidney Disease     Stage II      HPI:This is a follow up visit for Mr. Naomi Fiore who is here today for return appointment. I see him for hypertension. He was last seen about 7 months ago. Serum creatinine was 1.1 mg/dL. Today it is 1.2 mg/dL with a GFR of 61 mL/min. Doing well with no complaint. Did have the flu last month. Blood pressure had been running around 120/82 until 2 weeks ago when it started to go up but coming back down now and he was sick at that time     ROS:Constitutional: negative  Eyes: negative  Ears, nose, mouth, throat, and face: negative  Respiratory: negative  Cardiovascular: negative  Gastrointestinal: negative  Genitourinary:negative  Integument/breast: negative  Hematologic/lymphatic: negative  Musculoskeletal:negative  Neurological: negative  Behavioral/Psych: negative  Endocrine: negative  Allergic/Immunologic: negative     Medications:     Current Outpatient Medications   Medication Sig Dispense Refill    eplerenone (INSPRA) 50 MG tablet Take 50 mg by mouth daily      hydrOXYzine (ATARAX) 10 MG tablet Take 1-2 tablets by mouth every 6 hours as needed for Anxiety 40 tablet 0    labetalol (TRANDATE) 300 MG tablet Take 0.5 tablets by mouth daily 45 tablet 3    amLODIPine (NORVASC) 10 MG tablet Take 1 tablet by mouth daily - if your top BP number is less than 110 do not take (Patient taking differently: Take 5 mg by mouth daily - if your top BP number is less than 110 do not take) 30 tablet 5    chlorthalidone (HYGROTEN) 50 MG tablet Take 1 tablet by mouth daily 30 tablet 11    PARoxetine (PAXIL) 20 MG tablet TAKE ONE TABLET BY MOUTH IN THE MORNING 90 tablet 3    potassium chloride (KLOR-CON M) 20 MEQ extended release tablet Take 2 tablets by mouth daily 180 tablet 1    acetaminophen (TYLENOL) 325 MG tablet Take 650 mg by mouth every 6 hours as needed for Pain       No current facility-administered medications for this visit.         Lab Results:    CBC:   Lab Results   Component Value Date    WBC 5.7 09/06/2018    HGB 14.9 06/07/2019    HCT 44.2 06/07/2019    MCV 86.9 09/06/2018     09/06/2018     BMP:    Lab Results   Component Value Date     12/12/2019     06/21/2019     06/07/2019    K 3.5 12/12/2019    K 3.9 06/21/2019    K 3.5 06/07/2019     12/12/2019    CL 98 06/21/2019    CL 96 (L) 06/07/2019    CO2 27 12/12/2019    CO2 30 06/21/2019    CO2 31 06/07/2019    BUN 18 12/12/2019    BUN 12 06/21/2019    BUN 18 06/07/2019    CREATININE 1.2 12/12/2019    CREATININE 1.1 06/21/2019    CREATININE 1.4 (H) 06/07/2019    GLUCOSE 128 (H) 12/12/2019    GLUCOSE 115 (H) 06/21/2019    GLUCOSE 112 (H) 06/07/2019      Hepatic:   Lab Results   Component Value Date    AST 32 12/12/2019    AST 27 06/07/2019    AST 25 10/26/2018    ALT 41 12/12/2019    ALT 35 06/07/2019    ALT 28 10/26/2018    BILITOT 0.9 12/12/2019    BILITOT 1.4 (H) 06/07/2019 BILITOT 1.3 (H) 10/26/2018    ALKPHOS 59 12/12/2019    ALKPHOS 64 06/07/2019    ALKPHOS 59 10/26/2018     BNP: No results found for: BNP  Lipids:   Lab Results   Component Value Date    CHOL 141 12/12/2019    HDL 34 12/12/2019     INR:   Lab Results   Component Value Date    INR 0.94 09/06/2018    INR 1.24 (H) 07/23/2018    INR 1.00 07/16/2018     URINE:   Lab Results   Component Value Date    NAUR 139 11/02/2011    NAUR 170 11/02/2011    PROTUR 24.0 02/20/2012     Lab Results   Component Value Date    NITRU NEGATIVE 07/16/2019    COLORU YELLOW 07/16/2019    PHUR 7.5 07/16/2019    LABCAST 0-4 HYALINE 07/23/2018    LABCAST NONE SEEN 07/23/2018    WBCUA 2-4 07/16/2019    RBCUA 0-2 07/16/2019    YEAST NONE SEEN 07/16/2019    BACTERIA NONE 07/16/2019    CLARITYU clear 06/03/2016    SPECGRAV 1.028 07/23/2018    LEUKOCYTESUR TRACE 07/16/2019    UROBILINOGEN 1.0 07/16/2019    BILIRUBINUR NEGATIVE 07/16/2019    BILIRUBINUR neg 06/03/2016    BILIRUBINUR NEGATIVE 10/20/2011    BLOODU NEGATIVE 07/16/2019    GLUCOSEU NEGATIVE 07/16/2019    KETUA NEGATIVE 07/16/2019      Microalbumen/Creatinine ratio:  No components found for: RUCREAT    Objective:   Vitals: BP (!) 149/91 (Site: Left Upper Arm, Position: Sitting, Cuff Size: Large Adult)   Pulse 64   Wt 227 lb (103 kg)   SpO2 96%   BMI 35.55 kg/m²      Constitutional:  Alert, awake, no apparent distress  Skin:normal with no rash or any lesions  HEENT:Pupils are reactive . Throat is clear. Oral mucosa is moist.  Neck:supple with no thyromegaly or bruit   Cardiovascular:  S1, S2 without murmur   Respiratory:  Clear to auscultation with no wheezes or rales  Abdomen: +bowel sound, soft, non tender and no bruit  Ext: No LE edema  Musculoskeletal:Intact  Neuro:Alert, awake and oriented with no obvious focal deficit.   Speech is normal.**    Electronically signed by Venkatesh Hope MD on 1/16/2020 at 1:26 PM

## 2020-06-22 ENCOUNTER — NURSE ONLY (OUTPATIENT)
Dept: LAB | Age: 65
End: 2020-06-22

## 2020-06-22 LAB
ALBUMIN SERPL-MCNC: 4.2 G/DL (ref 3.5–5.1)
ALP BLD-CCNC: 70 U/L (ref 38–126)
ALT SERPL-CCNC: 29 U/L (ref 11–66)
ANION GAP SERPL CALCULATED.3IONS-SCNC: 11 MEQ/L (ref 8–16)
AST SERPL-CCNC: 29 U/L (ref 5–40)
AVERAGE GLUCOSE: 126 MG/DL (ref 70–126)
BILIRUB SERPL-MCNC: 1.1 MG/DL (ref 0.3–1.2)
BUN BLDV-MCNC: 16 MG/DL (ref 7–22)
CALCIUM SERPL-MCNC: 9 MG/DL (ref 8.5–10.5)
CHLORIDE BLD-SCNC: 102 MEQ/L (ref 98–111)
CO2: 29 MEQ/L (ref 23–33)
CREAT SERPL-MCNC: 1.4 MG/DL (ref 0.4–1.2)
GFR SERPL CREATININE-BSD FRML MDRD: 51 ML/MIN/1.73M2
GLUCOSE BLD-MCNC: 109 MG/DL (ref 70–108)
HBA1C MFR BLD: 6.2 % (ref 4.4–6.4)
HDLC SERPL-MCNC: 38 MG/DL
LDL CHOLESTEROL DIRECT: 116.41 MG/DL
POTASSIUM SERPL-SCNC: 3.7 MEQ/L (ref 3.5–5.2)
PROSTATE SPECIFIC ANTIGEN: 2.28 NG/ML (ref 0–1)
SODIUM BLD-SCNC: 142 MEQ/L (ref 135–145)
TOTAL PROTEIN: 7.5 G/DL (ref 6.1–8)

## 2020-06-25 NOTE — PROGRESS NOTES
[facility]  [facility]  Dept: [phone]  Dept Fax: [phone]      Kamron Leary is a 63 y.o. male being evaluated by a Virtual Visit (video visit) encounter to address concerns as mentioned above. A caregiver was present when appropriate. Due to this being a TeleHealth encounter (During WBYMX-95 public health emergency), evaluation of the following organ systems was limited: Vitals/Constitutional/EENT/Resp/CV/GI//MS/Neuro/Skin/Heme-Lymph-Imm. Pursuant to the emergency declaration under the [facility], [address] [place] [address] and the [employer] Act, this Virtual Visit was conducted with patient's (and/or legal guardian's) consent, to reduce the patient's risk of exposure to COVID-19 and provide necessary medical care. The patient (and/or legal guardian) has also been advised to contact this office for worsening conditions or problems, and seek emergency medical treatment and/or call 911 if deemed necessary. Patient identification was verified at the start of the visit: Yes    Total time spent for this encounter: > 25 minutes    Services were provided through a video synchronous discussion virtually to substitute for in-person clinic visit. Patient and provider were located at their individual homes. --Magda Shea MD on 6/26/2020 at 9:35 AM    An electronic signature was used to authenticate this note. RAFAEL Rodrigues is a 63 y.o.male    Pt presents for follow up of IFG, HTN, Hyperlipidemia, Anxiety, GERD, Hyperaldosteronism. Pt feeling ok since last visit- no new complaints, issues, or problems. Pt feeling \"pretty good\" overall. Glucometer readings at home are not checked regularly. The home BP readings have been in the 140's / 100's range. Pulses typically mid 70's. Checking more frequently recently per pt report.        Pt currently taking Paxil 20 mg- 1 pill daily and Atarax PRN. Side effects noted: none  Sleep- Good generally. Interest- OK  Guilt- NO  Energy- Occasionally low, due to COVID 9 quarantine  Concentration- OK  Appetite- OK  Psychomotor Retardation- NO  Suicidal/ Homicidal Ideations- NO  Anxiety-NO  Libido- Normal, for my age  Employer? Lost work days? -  [employer]- - working from home since 3/23/2020. To return to office after 9/2020. Patient Active Problem List   Diagnosis    Colon polyps    GERD (gastroesophageal reflux disease)    Hyperlipidemia with target LDL less than 100    Anxiety    Hypertension due to endocrine disorder    IFG (impaired fasting glucose)    Primary hyperaldosteronism ([facility] [address] [facility]- Dr. Alejo Aase S/P laparoscopic cholecystectomy- 6/9/2018- Dr. Ainsley Wesley Diastolic dysfunction without heart failure    CKD (chronic kidney disease), stage II    Essential hypertension     Current Outpatient Medications   Medication Sig Dispense Refill    ciprofloxacin (CIPRO) 500 MG tablet Take 1 tablet by mouth 2 times daily 84 tablet 0    labetalol (TRANDATE) 300 MG tablet Take 0.5 tablets by mouth 2 times daily 90 tablet 3    amLODIPine (NORVASC) 5 MG tablet Take 1 tablet by mouth daily - if your top BP number is less than 110 do not take 90 tablet 3    eplerenone (INSPRA) 50 MG tablet Take 1 tablet by mouth daily 90 tablet 3    chlorthalidone (HYGROTEN) 50 MG tablet Take 1 tablet by mouth daily 90 tablet 3    PARoxetine (PAXIL) 20 MG tablet Take 1 tablet by mouth every morning 90 tablet 3    potassium chloride (KLOR-CON M) 20 MEQ extended release tablet Take 2 tablets by mouth daily 180 tablet 3    hydrOXYzine (ATARAX) 10 MG tablet Take 1-2 tablets by mouth every 6 hours as needed for Anxiety 40 tablet 0    acetaminophen (TYLENOL) 325 MG tablet Take 650 mg by mouth every 6 hours as needed for Pain       No current facility-administered medications for this visit.       Review of Date    LABMICR 2.46 12/12/2019     Lab Results   Component Value Date    TSH 1.000 12/12/2019    T4FREE 1.51 12/12/2019     Lab Results   Component Value Date    WBC 5.7 09/06/2018    HGB 14.9 06/07/2019    HCT 44.2 06/07/2019    MCV 86.9 09/06/2018     09/06/2018     Component      Latest Ref Rng & Units 6/22/2020 6/7/2019 3/9/2018 2/28/2017          12:54 PM 10:13 AM  9:26 AM  7:32 AM   Prostatic Specific Ag      0.00 - 1.00 ng/ml 2.28 (H) 0.75 1.01 (H) 0.94     Component      Latest Ref Rng & Units 1/18/2017 5/18/2015 11/12/2014 5/9/2013           8:05 AM 10:55 AM  9:12 AM  6:20 AM   Prostatic Specific Ag      0.00 - 1.00 ng/ml 2.53 (H) 0.90 1.87 0.92     Component      Latest Ref Rng & Units 10/20/2011          10:36 AM   Prostatic Specific Ag      0.00 - 1.00 ng/ml 1.41       Immunization History   Administered Date(s) Administered    Influenza 10/10/2013    Influenza Vaccine, unspecified formulation 10/10/2013    Tdap (Boostrix, Adacel) 05/01/2018    Zoster Live (Zostavax) 11/11/2013    Zoster Recombinant (Shingrix) 10/10/2018       Health Maintenance   Topic Date Due    Shingles Vaccine (3 of 3) 12/05/2018    Flu vaccine (Season Ended) 12/17/2020 (Originally 9/1/2020)    Colon cancer screen colonoscopy  04/28/2021    A1C test (Diabetic or Prediabetic)  06/22/2021    Potassium monitoring  06/22/2021    Creatinine monitoring  06/22/2021    Lipid screen  06/22/2025    DTaP/Tdap/Td vaccine (2 - Td) 05/01/2028    Hepatitis C screen  Completed    Hepatitis A vaccine  Aged Out    Hepatitis B vaccine  Aged Out    Hib vaccine  Aged Out    Meningococcal (ACWY) vaccine  Aged Out    Pneumococcal 0-64 years Vaccine  Aged Out    HIV screen  Discontinued     AAA ultrasound (Male, 65-75, smoked ever) indicated at this time? NO, no tobacco history  CT Lung Screen (55-80, 30 pk-yrs, smoking or quit <15 years) indicated at this time? NO, no tobacco history     Future Appointments   Date Time Provider Omar Teresa   7/16/2020  1:00 PM Hope Arreola MD [facility] [place]       ASSESSMENT       Diagnosis Orders   1. IFG (impaired fasting glucose)  Hemoglobin A1C    Microalbumin / Creatinine Urine Ratio    T4, Free    TSH without Reflex   2. Hypertension due to endocrine disorder  Basic Metabolic Panel    T4, Free    TSH without Reflex    labetalol (TRANDATE) 300 MG tablet    Basic Metabolic Panel    amLODIPine (NORVASC) 5 MG tablet    eplerenone (INSPRA) 50 MG tablet    chlorthalidone (HYGROTEN) 50 MG tablet    potassium chloride (KLOR-CON M) 20 MEQ extended release tablet   3. Hyperlipidemia with target LDL less than 100  Lipid Panel   4. Anxiety  PARoxetine (PAXIL) 20 MG tablet   5. CKD (chronic kidney disease), stage II  Basic Metabolic Panel   6. Primary hyperaldosteronism ([facility] [address]- Dr. Yesi Murray     7. Gastroesophageal reflux disease without esophagitis     8. Elevated PSA  ciprofloxacin (CIPRO) 500 MG tablet    PSA, Prostatic Specific Antigen       PLAN      Encouraged annual FLU VACCINE- pt declines (updated 6/26/2020)  SHINGRIX #2 overdue at this time- please get per [facility] on [employer] (updated 6/26/2020)  COLONOSCOPY done 4/28/2018 per Dr. Joel Brizuela- + Polyps- To do again in 2023 (updated 6/26/2020)  [address] done annually per Azra Poon- completed 3/2020- all ok- to follow up annually. (updated 6/26/2020)   Per Dr. Yeimi Mcbride previous recommendation, will increase Labetalol 300 mg- 1/2 pill 2x/day to keep BP < 140/90 on a regular basis. (#90/ 3 refills)   Call update on BP's/ Pulses in 2-3 weeks. After discussion with pt, will consider addition of Crestor in future to help with lowering LDL and raising HDL. Will hold at this time due changing other meds in meantime. After discussion with pt, will start Cipro 500 mg- 1 pill 2x/day for 6 weeks. #84. No refills/  Start Daily Probiotic at noon. Recheck PSA and BMP in 6 weeks to assure normalization.    Check HGA1C, FLP, BMP, SPOT MA, TSH/ FREE T4 in 6 months. Follow up as scheduled with Dr. Indu Malone 7/16/2020. (updated 6/26/2020)  Continue increased water intake at > 96 ounces daily.    Continue to work on diet, exercise (30 minutes, 5x/week), and weight loss for optimal cardiovascular health.    Continue current medicines  Refills  Follow up in 6 weeks        Electronically signed by Epi George MD on 6/26/2020 at 9:35 AM

## 2020-06-26 ENCOUNTER — PATIENT MESSAGE (OUTPATIENT)
Dept: FAMILY MEDICINE CLINIC | Age: 65
End: 2020-06-26

## 2020-06-26 ENCOUNTER — TELEMEDICINE (OUTPATIENT)
Dept: FAMILY MEDICINE CLINIC | Age: 65
End: 2020-06-26
Payer: COMMERCIAL

## 2020-06-26 PROCEDURE — 99214 OFFICE O/P EST MOD 30 MIN: CPT | Performed by: FAMILY MEDICINE

## 2020-06-26 RX ORDER — LABETALOL 300 MG/1
150 TABLET, FILM COATED ORAL 2 TIMES DAILY
Qty: 90 TABLET | Refills: 3 | Status: SHIPPED | OUTPATIENT
Start: 2020-06-26 | End: 2020-12-09

## 2020-06-26 RX ORDER — AMLODIPINE BESYLATE 5 MG/1
5 TABLET ORAL DAILY
Qty: 90 TABLET | Refills: 3 | Status: SHIPPED | OUTPATIENT
Start: 2020-06-26 | End: 2021-06-28 | Stop reason: SDUPTHER

## 2020-06-26 RX ORDER — POTASSIUM CHLORIDE 20 MEQ/1
40 TABLET, EXTENDED RELEASE ORAL DAILY
Qty: 180 TABLET | Refills: 3 | Status: SHIPPED | OUTPATIENT
Start: 2020-06-26 | End: 2020-12-09

## 2020-06-26 RX ORDER — CHLORTHALIDONE 50 MG/1
50 TABLET ORAL DAILY
Qty: 90 TABLET | Refills: 3 | Status: SHIPPED | OUTPATIENT
Start: 2020-06-26 | End: 2021-06-28 | Stop reason: SDUPTHER

## 2020-06-26 RX ORDER — EPLERENONE 50 MG/1
50 TABLET, FILM COATED ORAL DAILY
Qty: 90 TABLET | Refills: 3 | Status: SHIPPED | OUTPATIENT
Start: 2020-06-26 | End: 2021-06-28 | Stop reason: SDUPTHER

## 2020-06-26 RX ORDER — CIPROFLOXACIN 500 MG/1
500 TABLET, FILM COATED ORAL 2 TIMES DAILY
Qty: 84 TABLET | Refills: 0 | Status: SHIPPED | OUTPATIENT
Start: 2020-06-26 | End: 2020-08-07 | Stop reason: ALTCHOICE

## 2020-06-26 RX ORDER — PAROXETINE HYDROCHLORIDE 20 MG/1
20 TABLET, FILM COATED ORAL EVERY MORNING
Qty: 90 TABLET | Refills: 3 | Status: SHIPPED | OUTPATIENT
Start: 2020-06-26 | End: 2021-06-28 | Stop reason: SDUPTHER

## 2020-06-26 ASSESSMENT — ENCOUNTER SYMPTOMS
DIARRHEA: 0
CONSTIPATION: 0
CHEST TIGHTNESS: 0
VOMITING: 0
BLOOD IN STOOL: 0
NAUSEA: 0
ABDOMINAL PAIN: 0
SHORTNESS OF BREATH: 0
ANAL BLEEDING: 0

## 2020-08-04 ENCOUNTER — NURSE ONLY (OUTPATIENT)
Dept: LAB | Age: 65
End: 2020-08-04

## 2020-08-04 LAB
ANION GAP SERPL CALCULATED.3IONS-SCNC: 12 MEQ/L (ref 8–16)
BUN BLDV-MCNC: 15 MG/DL (ref 7–22)
CALCIUM SERPL-MCNC: 9.7 MG/DL (ref 8.5–10.5)
CHLORIDE BLD-SCNC: 99 MEQ/L (ref 98–111)
CO2: 27 MEQ/L (ref 23–33)
CREAT SERPL-MCNC: 1.2 MG/DL (ref 0.4–1.2)
GFR SERPL CREATININE-BSD FRML MDRD: 61 ML/MIN/1.73M2
GLUCOSE BLD-MCNC: 109 MG/DL (ref 70–108)
POTASSIUM SERPL-SCNC: 3.4 MEQ/L (ref 3.5–5.2)
PROSTATE SPECIFIC ANTIGEN: 1.13 NG/ML (ref 0–1)
SODIUM BLD-SCNC: 138 MEQ/L (ref 135–145)

## 2020-08-07 ENCOUNTER — OFFICE VISIT (OUTPATIENT)
Dept: FAMILY MEDICINE CLINIC | Age: 65
End: 2020-08-07
Payer: COMMERCIAL

## 2020-08-07 VITALS
HEART RATE: 64 BPM | SYSTOLIC BLOOD PRESSURE: 124 MMHG | BODY MASS INDEX: 35.28 KG/M2 | DIASTOLIC BLOOD PRESSURE: 82 MMHG | WEIGHT: 224.8 LBS | RESPIRATION RATE: 16 BRPM | HEIGHT: 67 IN | TEMPERATURE: 98.1 F

## 2020-08-07 PROCEDURE — 99213 OFFICE O/P EST LOW 20 MIN: CPT | Performed by: NURSE PRACTITIONER

## 2020-08-07 ASSESSMENT — PATIENT HEALTH QUESTIONNAIRE - PHQ9
1. LITTLE INTEREST OR PLEASURE IN DOING THINGS: 0
SUM OF ALL RESPONSES TO PHQ QUESTIONS 1-9: 0
2. FEELING DOWN, DEPRESSED OR HOPELESS: 0
SUM OF ALL RESPONSES TO PHQ QUESTIONS 1-9: 0
SUM OF ALL RESPONSES TO PHQ9 QUESTIONS 1 & 2: 0

## 2020-08-07 ASSESSMENT — ENCOUNTER SYMPTOMS
SHORTNESS OF BREATH: 0
CHEST TIGHTNESS: 0
BLOOD IN STOOL: 0
EYES NEGATIVE: 1
ABDOMINAL PAIN: 0

## 2020-08-07 NOTE — PROGRESS NOTES
Chief Complaint   Patient presents with    Other     6 week follow up       Rikki Gonsales is a 63 y.o.male      Here for follow up of elevated PSA and HTN. Patient Active Problem List   Diagnosis    Colon polyps    GERD (gastroesophageal reflux disease)    Hyperlipidemia with target LDL less than 100    Anxiety    Hypertension due to endocrine disorder    IFG (impaired fasting glucose)    [facility] Dr. Jeannine Ma S/P laparoscopic cholecystectomy- 6/9/2018- Dr. Deb Kline Diastolic dysfunction without heart failure    CKD (chronic kidney disease), stage II    Essential hypertension     Per Dr. Zurdo Wall previous recommendation, Labetalol was increased to 300 mg- 1/2 pill 2x/day. Pt states this has been working well for him. Home BP has been \"really good\". BP this morning before meds was 120/82 and after was 114/82. Pt was placed on Cipro for 6 weeks due to elevated PSA. Previous urinary symptoms have resolved. Review of Systems   Constitutional: Negative for chills, fatigue, fever and unexpected weight change. HENT: Negative. Eyes: Negative. Respiratory: Negative for chest tightness and shortness of breath. Cardiovascular: Negative for chest pain, palpitations and leg swelling. Gastrointestinal: Negative for abdominal pain and blood in stool. Nonpainful upper right quad \"tugging\" intermittently   Genitourinary: Negative for dysuria. Musculoskeletal: Negative for joint swelling. Skin: Negative for rash. Neurological: Negative for dizziness. Psychiatric/Behavioral: Negative. All other systems reviewed and are negative.         OBJECTIVE     /82 (Site: Left Upper Arm, Position: Sitting, Cuff Size: Large Adult)   Pulse 64   Temp 98.1 °F (36.7 °C) (Temporal)   Resp 16   Ht 5' 7\" (1.702 m)   Wt 224 lb 12.8 oz (102 kg)   BMI 35.21 kg/m²     Wt Readings from Last 3 Encounters:   08/07/20 224 lb 12.8 oz (102 kg)   01/16/20 227 lb (103 kg)   12/17/19 224 lb (101.6 kg)       Physical Exam  Vitals signs and nursing note reviewed. Constitutional:       Appearance: He is well-developed. HENT:      Head: Normocephalic and atraumatic. Right Ear: External ear normal.      Left Ear: External ear normal.      Nose: Nose normal.   Eyes:      Conjunctiva/sclera: Conjunctivae normal.      Pupils: Pupils are equal, round, and reactive to light. Neck:      Musculoskeletal: Normal range of motion and neck supple. Cardiovascular:      Rate and Rhythm: Normal rate and regular rhythm. Pulmonary:      Effort: Pulmonary effort is normal.      Breath sounds: Normal breath sounds. Abdominal:      General: Bowel sounds are normal.      Palpations: Abdomen is soft. Musculoskeletal: Normal range of motion. Skin:     General: Skin is warm and dry. Neurological:      Mental Status: He is alert and oriented to person, place, and time. Deep Tendon Reflexes: Reflexes are normal and symmetric. Psychiatric:         Behavior: Behavior normal.         Thought Content:  Thought content normal.         Judgment: Judgment normal.           Component      Latest Ref Rng & Units 8/4/2020   Sodium      135 - 145 meq/L 138   Potassium      3.5 - 5.2 meq/L 3.4 (L)   Chloride      98 - 111 meq/L 99   CO2      23 - 33 meq/L 27   Glucose      70 - 108 mg/dL 109 (H)   BUN      7 - 22 mg/dL 15   Creatinine      0.4 - 1.2 mg/dL 1.2   Calcium      8.5 - 10.5 mg/dL 9.7   Prostatic Specific Ag      0.00 - 1.00 ng/mL 1.13 (H)   Anion Gap      8.0 - 16.0 meq/L 12.0   Est, Glom Filt Rate      ml/min/1.73m2 61 (A)     Component      Latest Ref Rng & Units 8/4/2020 6/22/2020 6/7/2019 3/9/2018           9:14 AM 12:54 PM 10:13 AM  9:26 AM   Prostatic Specific Ag      0.00 - 1.00 ng/mL 1.13 (H) 2.28 (H) 0.75 1.01 (H)     Component      Latest Ref Rng & Units 2/28/2017 1/18/2017           7:32 AM  8:05 AM   Prostatic Specific Ag      0.00 - 1.00 ng/mL 0.94 2.53 (H) Immunization History   Administered Date(s) Administered    Influenza 10/10/2013    Influenza Vaccine, unspecified formulation 10/10/2013    Tdap (Boostrix, Adacel) 05/01/2018    Zoster Live (Zostavax) 11/11/2013    Zoster Recombinant (Shingrix) 10/10/2018         Health Maintenance   Topic Date Due    Shingles Vaccine (3 of 3) 12/05/2018    Flu vaccine (1) 09/01/2020    Colon cancer screen colonoscopy  04/28/2021    A1C test (Diabetic or Prediabetic)  06/22/2021    Potassium monitoring  08/04/2021    Creatinine monitoring  08/04/2021    Lipid screen  06/22/2025    DTaP/Tdap/Td vaccine (2 - Td) 05/01/2028    Hepatitis C screen  Completed    Hepatitis A vaccine  Aged Out    Hepatitis B vaccine  Aged Out    Hib vaccine  Aged Out    Meningococcal (ACWY) vaccine  Aged Out    Pneumococcal 0-64 years Vaccine  Aged Out    HIV screen  Discontinued       Future Appointments   Date Time Provider Omar Teresa   8/27/2020  1:20 PM Eliud Hood MD [facility] [place]   12/9/2020  2:30 PM Chidi Hernandez MD [facility]         ASSESSMENT       Diagnosis Orders   1. Essential hypertension     2. Elevated PSA     3. IFG (impaired fasting glucose)         PLAN        Encouraged annual FLU VACCINE- pt declines (updated 8/7/2020)  SHINGRIX #2 overdue at this time- please get per [employer] on [address] [place] [address] Blvd 8/7/2020)  COLONOSCOPY done 4/28/2018 per Dr. Dae Vazquez- + Polyps- To do again in 2023 (updated 6/26/2020)  [address] done annually per Eulogio Moy- completed 3/2020- all ok- to follow up annually. (updated 8/7/2020)   After discussion with pt, will consider addition of Crestor in future to help with lowering LDL and raising HDL. Will hold at this time due changing other meds in meantime. Continue labetalol 150 mg twice daily  HGA1C, FLP, BMP, SPOT MA, TSH/ FREE T4 in 4 months - already ordered. Follow up as scheduled with Dr. Yuliana Aguirre on 8/27/2020.

## 2020-08-07 NOTE — PATIENT INSTRUCTIONS
Encouraged annual FLU VACCINE- pt declines (updated 8/7/2020)  SHINGRIX #2 overdue at this time- please get per [facility] on [address] 8/7/2020)  COLONOSCOPY done 4/28/2018 per Dr. Jhon Chaidez- + Polyps- To do again in 2023 (updated 6/26/2020)  [address] done annually per Bharti Andrews- completed 3/2020- all ok- to follow up annually. (updated 8/7/2020)   After discussion with pt, will consider addition of Crestor in future to help with lowering LDL and raising HDL. Will hold at this time due changing other meds in meantime. HGA1C, FLP, BMP, SPOT MA, TSH/ FREE T4 in 4 months - already ordered. Follow up as scheduled with Dr. Sulema Sandifer on 8/27/2020. Continue increased water intake at > 96 ounces daily.    Continue to work on diet, exercise (30 minutes, 5x/week), and weight loss for optimal cardiovascular health.    Continue current medicines  Refills - none today  Follow up in 4 months

## 2020-08-07 NOTE — PROGRESS NOTES
Visit Information    Have you changed or started any medications since your last visit including any over-the-counter medicines, vitamins, or herbal medicines? no   Are you having any side effects from any of your medications? -  no  Have you stopped taking any of your medications? Is so, why? -  no    Have you seen any other physician or provider since your last visit? No  Have you had any other diagnostic tests since your last visit? No  Have you been seen in the emergency room and/or had an admission to a hospital since we last saw you? No  Have you had your routine dental cleaning in the past 6 months? yes - 6/2020    Have you activated your [employer] account? If not, what are your barriers?  Yes     Patient Care Team:  Kal Avelar MD as PCP - General (Family Medicine)  Kal Avelar MD as PCP - [facility]    Medical History Review  Past Medical, Family, and Social History reviewed and does contribute to the patient presenting condition    Health Maintenance   Topic Date Due    Shingles Vaccine (3 of 3) 12/05/2018    Flu vaccine (1) 09/01/2020    Colon cancer screen colonoscopy  04/28/2021    A1C test (Diabetic or Prediabetic)  06/22/2021    Potassium monitoring  08/04/2021    Creatinine monitoring  08/04/2021    Lipid screen  06/22/2025    DTaP/Tdap/Td vaccine (2 - Td) 05/01/2028    Hepatitis C screen  Completed    Hepatitis A vaccine  Aged Out    Hepatitis B vaccine  Aged Out    Hib vaccine  Aged Out    Meningococcal (ACWY) vaccine  Aged Out    Pneumococcal 0-64 years Vaccine  Aged Out    HIV screen  Discontinued

## 2020-08-27 ENCOUNTER — OFFICE VISIT (OUTPATIENT)
Dept: NEPHROLOGY | Age: 65
End: 2020-08-27
Payer: COMMERCIAL

## 2020-08-27 VITALS
BODY MASS INDEX: 35.08 KG/M2 | HEART RATE: 63 BPM | DIASTOLIC BLOOD PRESSURE: 94 MMHG | WEIGHT: 224 LBS | TEMPERATURE: 97.9 F | SYSTOLIC BLOOD PRESSURE: 129 MMHG | OXYGEN SATURATION: 94 %

## 2020-08-27 PROCEDURE — 99213 OFFICE O/P EST LOW 20 MIN: CPT | Performed by: INTERNAL MEDICINE

## 2020-08-27 NOTE — PROGRESS NOTES
Renal Progress Note    Assessment and Plan:      Diagnosis Orders   1. CKD (chronic kidney disease), stage II     2. Essential hypertension     3. Hypokalemia       PLAN:  Labs are discussed with the patient. He understood. Serum creatinine is stable at 1.2 mg/dL. Medications reviewed. Increase potassium supplement from 20 mEq 2 tablets a day to 20 mEq 2 tablets twice a day for 5 days only. Thereafter go back to 20 mEq 2 tablets a day. Potassium magnesium level on 31 August 2020. Printed instruction provided to the patient on the above. Return visit in 6 months with labs. Patient Active Problem List   Diagnosis    Colon polyps    GERD (gastroesophageal reflux disease)    Hyperlipidemia with target LDL less than 100    Anxiety    Hypertension due to endocrine disorder    IFG (impaired fasting glucose)    Primary hyperaldosteronism ([facility]- Dr. Magno Armstrong S/P laparoscopic cholecystectomy- 6/9/2018- Dr. Chad Hughes Diastolic dysfunction without heart failure    CKD (chronic kidney disease), stage II    Essential hypertension       Subjective:   Chief complaint:  Chief Complaint   Patient presents with    Hypertension      HPI:This is a follow up visit for Mr. Karla Martinez who is here today for return appointment. I see him for chronic kidney disease and hypertension. He was last seen in January 2020. [facility] Serum creatinine was 1.2 mg/dL. Today it is still 1.2 mg/dL. Doing well with no new complaint. Denies any chest pain or shortness of breath. Denies fever or chills. No nausea vomiting. No headaches. He does have chronic anxiety. Home blood pressure is good according to him    ROS:Constitutional: negative  Eyes: negative  Ears, nose, mouth, throat, and face: negative  Respiratory: negative  Cardiovascular: negative  Gastrointestinal: negative  Genitourinary:negative  Integument/breast: negative  Hematologic/lymphatic: negative  Musculoskeletal:negative  Neurological: negative  Behavioral/Psych: positive for anxiety  Endocrine: negative  Allergic/Immunologic: negative     Medications:     Current Outpatient Medications   Medication Sig Dispense Refill    labetalol (TRANDATE) 300 MG tablet Take 0.5 tablets by mouth 2 times daily 90 tablet 3    amLODIPine (NORVASC) 5 MG tablet Take 1 tablet by mouth daily - if your top BP number is less than 110 do not take 90 tablet 3    eplerenone (INSPRA) 50 MG tablet Take 1 tablet by mouth daily 90 tablet 3    chlorthalidone (HYGROTEN) 50 MG tablet Take 1 tablet by mouth daily 90 tablet 3    PARoxetine (PAXIL) 20 MG tablet Take 1 tablet by mouth every morning 90 tablet 3    potassium chloride (KLOR-CON M) 20 MEQ extended release tablet Take 2 tablets by mouth daily 180 tablet 3    hydrOXYzine (ATARAX) 10 MG tablet Take 1-2 tablets by mouth every 6 hours as needed for Anxiety 40 tablet 0    acetaminophen (TYLENOL) 325 MG tablet Take 650 mg by mouth every 6 hours as needed for Pain       No current facility-administered medications for this visit.         Lab Results:    CBC:   Lab Results   Component Value Date    WBC 5.7 09/06/2018    HGB 14.9 06/07/2019    HCT 44.2 06/07/2019    MCV 86.9 09/06/2018     09/06/2018     BMP:    Lab Results   Component Value Date     08/04/2020     06/22/2020     12/12/2019    K 3.4 (L) 08/04/2020    K 3.7 06/22/2020    K 3.5 12/12/2019    CL 99 08/04/2020     06/22/2020     12/12/2019    CO2 27 08/04/2020    CO2 29 06/22/2020    CO2 27 12/12/2019    BUN 15 08/04/2020    BUN 16 06/22/2020    BUN 18 12/12/2019    CREATININE 1.2 08/04/2020    CREATININE 1.4 (H) 06/22/2020    CREATININE 1.2 12/12/2019    GLUCOSE 109 (H) 08/04/2020    GLUCOSE 109 (H) 06/22/2020    GLUCOSE 128 (H) 12/12/2019      Hepatic:   Lab Results   Component Value Date    AST 29 06/22/2020    AST 32 12/12/2019    AST 27 06/07/2019    ALT 29 06/22/2020    ALT 41 12/12/2019    ALT 35 06/07/2019    BILITOT 1.1 06/22/2020    BILITOT 0.9 12/12/2019    BILITOT 1.4 (H) 06/07/2019    ALKPHOS 70 06/22/2020    ALKPHOS 59 12/12/2019    ALKPHOS 64 06/07/2019     BNP: No results found for: BNP  Lipids:   Lab Results   Component Value Date    CHOL 141 12/12/2019    HDL 38 06/22/2020     INR:   Lab Results   Component Value Date    INR 0.94 09/06/2018    INR 1.24 (H) 07/23/2018    INR 1.00 07/16/2018     URINE:   Lab Results   Component Value Date    NAUR 139 11/02/2011    NAUR 170 11/02/2011    PROTUR 24.0 02/20/2012     Lab Results   Component Value Date    NITRU NEGATIVE 07/16/2019    COLORU YELLOW 07/16/2019    PHUR 7.5 07/16/2019    LABCAST 0-4 HYALINE 07/23/2018    LABCAST NONE SEEN 07/23/2018    WBCUA 2-4 07/16/2019    RBCUA 0-2 07/16/2019    YEAST NONE SEEN 07/16/2019    BACTERIA NONE 07/16/2019    CLARITYU clear 06/03/2016    SPECGRAV 1.028 07/23/2018    LEUKOCYTESUR TRACE 07/16/2019    UROBILINOGEN 1.0 07/16/2019    BILIRUBINUR NEGATIVE 07/16/2019    BILIRUBINUR neg 06/03/2016    BILIRUBINUR NEGATIVE 10/20/2011    BLOODU NEGATIVE 07/16/2019    GLUCOSEU NEGATIVE 07/16/2019    KETUA NEGATIVE 07/16/2019      Microalbumen/Creatinine ratio:  No components found for: RUCREAT    Objective:   Vitals: BP (!) 129/94 (Site: Left Upper Arm, Position: Sitting, Cuff Size: Large Adult)   Pulse 63   Temp 97.9 °F (36.6 °C)   Wt 224 lb (101.6 kg)   SpO2 94%   BMI 35.08 kg/m²      Constitutional:  Alert, awake, no apparent distress  Skin:normal with no rash or any lesions  HEENT:Pupils are reactive . Throat is clear. Oral mucosa is moist.  Neck:supple with no thyromegaly or bruit  Cardiovascular:  S1, S2 without murmur   Respiratory:  Clear to auscultation with no wheezes or rales  Abdomen: +bowel sound, soft, non tender and no bruit  Ext: No LE edema  Musculoskeletal:Intact  Neuro:Alert, awake and oriented with no obvious focal deficit.   Speech is normal.    Electronically signed by Autumn Garrison MD on 8/27/2020 at 1:30 PM

## 2020-08-27 NOTE — PATIENT INSTRUCTIONS
Please increase the potassium from 20 mEq 2 tablets once a day to twice a day for 5 days. After 5 days go back to 2 tablets once a day.

## 2020-09-03 ENCOUNTER — NURSE ONLY (OUTPATIENT)
Dept: LAB | Age: 65
End: 2020-09-03

## 2020-09-03 LAB
MAGNESIUM: 2.1 MG/DL (ref 1.6–2.4)
POTASSIUM SERPL-SCNC: 3.6 MEQ/L (ref 3.5–5.2)

## 2020-11-09 ENCOUNTER — VIRTUAL VISIT (OUTPATIENT)
Dept: FAMILY MEDICINE CLINIC | Age: 65
End: 2020-11-09
Payer: COMMERCIAL

## 2020-11-09 ENCOUNTER — HOSPITAL ENCOUNTER (OUTPATIENT)
Age: 65
Setting detail: SPECIMEN
Discharge: HOME | End: 2020-11-09
Payer: COMMERCIAL

## 2020-11-09 PROCEDURE — U0003 INFECTIOUS AGENT DETECTION BY NUCLEIC ACID (DNA OR RNA); SEVERE ACUTE RESPIRATORY SYNDROME CORONAVIRUS 2 (SARS-COV-2) (CORONAVIRUS DISEASE [COVID-19]), AMPLIFIED PROBE TECHNIQUE, MAKING USE OF HIGH THROUGHPUT TECHNOLOGIES AS DESCRIBED BY CMS-2020-01-R: HCPCS

## 2020-11-09 PROCEDURE — 99213 OFFICE O/P EST LOW 20 MIN: CPT | Performed by: NURSE PRACTITIONER

## 2020-11-09 ASSESSMENT — ENCOUNTER SYMPTOMS
SINUS PRESSURE: 1
VOMITING: 0
ABDOMINAL PAIN: 0
COUGH: 1
NAUSEA: 0
WHEEZING: 0
SHORTNESS OF BREATH: 0
SORE THROAT: 0

## 2020-11-09 NOTE — PROGRESS NOTES
[facility]  [address] [facility] [address]  Dept: [phone]  Dept Fax: [phone]  Loc: [phone]      2020    TELEHEALTH EVALUATION -- Audio/Visual (During X-14 public health emergency)    HPI:    Pamela Rai (:  1955) has requested an audio/video evaluation for the following concern(s):    Pt symptoms started on 11/3/2020 with a fever at home. Wife is + last week. He is about 2 days behind his wife in symptoms. Pt is having loss of taste and smell. He has had a fever, cough and congestion. He also has body aches and chills at times. Pt denies any chest pain or SOB. Fever    This is a new problem. The current episode started in the past 7 days. The problem has been gradually improving. The maximum temperature noted was 101 to 101.9 F. Associated symptoms include congestion, coughing, headaches, muscle aches and sleepiness. Pertinent negatives include no abdominal pain, chest pain, nausea, sore throat, urinary pain, vomiting or wheezing. He has tried fluids for the symptoms. The treatment provided mild relief. Risk factors: sick contacts    Risk factors: no contaminated food, no hx of cancer, no immunosuppression, no recent sickness and no recent travel          Review of Systems   Constitutional: Positive for chills, fatigue and fever. HENT: Positive for congestion and sinus pressure. Negative for sore throat. Respiratory: Positive for cough. Negative for shortness of breath and wheezing. Cardiovascular: Negative for chest pain and palpitations. Gastrointestinal: Negative for abdominal pain, nausea and vomiting. Genitourinary: Negative for dysuria. Musculoskeletal: Positive for arthralgias, myalgias and neck pain. Neurological: Positive for headaches. Negative for dizziness. Prior to Visit Medications    Medication Sig Taking?  Authorizing Provider   labetalol (TRANDATE) 300 MG tablet Take 0.5 tablets by mouth 2 times daily  Kenneth Sandoval MD   amLODIPine (NORVASC) 5 MG tablet Take 1 tablet by mouth daily - if your top BP number is less than 110 do not take  Kenneth Sandoval MD   eplerenone (INSPRA) 50 MG tablet Take 1 tablet by mouth daily  Kenneth Sandoval MD   chlorthalidone (HYGROTEN) 50 MG tablet Take 1 tablet by mouth daily  Kenneth Sandoval MD   PARoxetine (PAXIL) 20 MG tablet Take 1 tablet by mouth every morning  Kenneth Sandoval MD   potassium chloride (KLOR-CON M) 20 MEQ extended release tablet Take 2 tablets by mouth daily  Kenneth Sandoval MD   hydrOXYzine (ATARAX) 10 MG tablet Take 1-2 tablets by mouth every 6 hours as needed for Anxiety  Kenneth Sandoval MD   acetaminophen (TYLENOL) 325 MG tablet Take 650 mg by mouth every 6 hours as needed for Pain  Historical MD Deb       Social History     Tobacco Use    Smoking status: Never Smoker    Smokeless tobacco: Never Used   Substance Use Topics    Alcohol use: No    Drug use: No        Allergies   Allergen Reactions    Lipitor [Atorvastatin] Other (See Comments)     Fatigue, Muscle Cramps    Aldactone [Spironolactone] Other (See Comments)     hyperaldosteronism   ,   Past Medical History:   Diagnosis Date    Gallbladder disease     GERD (gastroesophageal reflux disease)     HTN (hypertension)     Hyperaldosteronism (HCC)     Hypertension     Hypokalemia     Metabolic alkalosis     Prostate troubles    ,   Past Surgical History:   Procedure Laterality Date    CHOLECYSTECTOMY      COLONOSCOPY  05/2013    Dr. Juanis Dickson    COLONOSCOPY  04/2018    Dr Dickson Hard N/A 6/9/2018    LAPAROSCOPIC CHOLECYSTECTOMY performed by Razia Leon DO at [address] OFFICE/OUTPT [address] N/A 7/18/2018    ERCP DIAGNOSTIC performed by Dimitris Wolfe MD at [address]  11/06/2012    Behind right ear and left ring finger       PHYSICAL EXAMINATION:  [ INSTRUCTIONS:  \"[x]\" Indicates a positive item  \"[]\" Indicates a negative item  -- DELETE ALL ITEMS NOT EXAMINED]  Vital Signs: (As obtained by patient/caregiver or practitioner observation)    Blood pressure-  Heart rate-    Respiratory rate- 16 observed   Temperature-  Pulse oximetry-     Constitutional: [x] Appears well-developed and well-nourished [x] No apparent distress      [] Abnormal-   Mental status  [x] Alert and awake  [x] Oriented to person/place/time [x]Able to follow commands      Eyes:  EOM    [x]  Normal  [] Abnormal-  Sclera  [x]  Normal  [] Abnormal -         Discharge [x]  None visible  [] Abnormal -    HENT:   [x] Normocephalic, atraumatic. [] Abnormal   [x] Mouth/Throat: Mucous membranes are moist.     External Ears [x] Normal  [] Abnormal-     Neck: [x] No visualized mass     Pulmonary/Chest: [x] Respiratory effort normal.  [x] No visualized signs of difficulty breathing or respiratory distress        [] Abnormal-      Musculoskeletal:   [x] Normal gait with no signs of ataxia         [x] Normal range of motion of neck        [] Abnormal-       Neurological:        [x] No Facial Asymmetry (Cranial nerve 7 motor function) (limited exam to video visit)          [x] No gaze palsy        [] Abnormal-         Skin:        [x] No significant exanthematous lesions or discoloration noted on facial skin         [] Abnormal-            Psychiatric:       [x] Normal Affect [x] No Hallucinations        [] Abnormal-     Other pertinent observable physical exam findings- none    ASSESSMENT/PLAN:  1. Exposure to COVID-19 virus  - COVID-19 Ambulatory; Future    2. Loss of perception for smell  - COVID-19 Ambulatory; Future    3. Fever, unspecified fever cause  - COVID-19 Ambulatory; Future    4. Loss of taste  - COVID-19 Ambulatory; Future    - Pt to be tested at [facility]. Going now.    - Rest and increase fluids  - Tylenol as needed for pain and fever  - Isolate until testing results are back  - Good handwashing and clean all surfaces touched  - Call office with any questions or concerns, or if symptoms are getting worse or changing  - ER for any chest pain or SOB      Return if symptoms worsen or fail to improve. Sarah Cunha is a 63 y.o. male being evaluated by a Virtual Visit (video visit) encounter to address concerns as mentioned above. A caregiver was present when appropriate. Due to this being a TeleHealth encounter ([facility]), evaluation of the following organ systems was limited: Vitals/Constitutional/EENT/Resp/CV/GI//MS/Neuro/Skin/Heme-Lymph-Imm. Pursuant to the emergency declaration under the [address] [place] [address], [address] [place] [address] authority and the [employer] Act, this Virtual Visit was conducted with patient's (and/or legal guardian's) consent, to reduce the patient's risk of exposure to COVID-19 and provide necessary medical care. The patient (and/or legal guardian) has also been advised to contact this office for worsening conditions or problems, and seek emergency medical treatment and/or call 911 if deemed necessary. Patient identification was verified at the start of the visit: Yes    Total time spent on this encounter: 25 min    Services were provided through a video synchronous discussion virtually to substitute for in-person clinic visit. Patient and provider were located at their individual homes. --ANNE MARIE Isabel CNP on 11/9/2020 at 4:05 PM    An electronic signature was used to authenticate this note.

## 2020-11-09 NOTE — PATIENT INSTRUCTIONS
Patient Education        Coronavirus (COUYX-96): Care Instructions  Overview  The coronavirus disease (COVID-19) is caused by a virus. Symptoms may include a fever, a cough, and shortness of breath. It mainly spreads person-to-person through droplets from coughing and sneezing. The virus also can spread when people are in close contact with someone who is infected. Most people have mild symptoms and can take care of themselves at home. If their symptoms get worse, they may need care in a hospital. Treatment may include medicines to reduce symptoms, plus breathing support such as oxygen therapy or a ventilator. It's important to not spread the virus to others. If you have COVID-19, wear a face cover anytime you are around other people. You need to isolate yourself while you are sick. Leave your home only if you need to get medical care or testing. Follow-up care is a key part of your treatment and safety. Be sure to make and go to all appointments, and call your doctor if you are having problems. It's also a good idea to know your test results and keep a list of the medicines you take. How can you care for yourself at home? · Get extra rest. It can help you feel better. · Drink plenty of fluids. This helps replace fluids lost from fever. Fluids also help ease a scratchy throat. Water, soup, fruit juice, and hot tea with lemon are good choices. · Take acetaminophen (such as Tylenol) to reduce a fever. It may also help with muscle aches. Read and follow all instructions on the label. · Use petroleum jelly on sore skin. This can help if the skin around your nose and lips becomes sore from rubbing a lot with tissues. Tips for self-isolation  · Limit contact with people in your home. If possible, stay in a separate bedroom and use a separate bathroom. · Wear a cloth face cover when you are around other people. It can help stop the spread of the virus when you cough or sneeze.   · If you have to leave home, avoid crowds and try to stay at least 6 feet away from other people. · Avoid contact with pets and other animals. · Cover your mouth and nose with a tissue when you cough or sneeze. Then throw it in the trash right away. · Wash your hands often, especially after you cough or sneeze. Use soap and water, and scrub for at least 20 seconds. If soap and water aren't available, use an alcohol-based hand . · Don't share personal household items. These include bedding, towels, cups and glasses, and eating utensils. · [address] in the warmest water allowed for the fabric type, and dry it completely. It's okay to wash other people's laundry with yours. · Clean and disinfect your home every day. Use household  and disinfectant wipes or sprays. Take special care to clean things that you grab with your hands. These include doorknobs, remote controls, phones, and handles on your refrigerator and microwave. And don't forget countertops, tabletops, bathrooms, and computer keyboards. When you can end self-isolation  · If you know or suspect that you have COVID-19, stay in self-isolation until:  ? You haven't had a fever for 3 days, and  ? Your symptoms have improved, and  ? It's been at least 10 days since your symptoms started. · Talk to your doctor about whether you also need testing, especially if you have a weakened immune system. When should you call for help? Call 911 anytime you think you may need emergency care. For example, call if you have life-threatening symptoms, such as:    · You have severe trouble breathing. (You can't talk at all.)     · You have constant chest pain or pressure.     · You are severely dizzy or lightheaded.     · You are confused or can't think clearly.     · Your face and lips have a blue color.     · You pass out (lose consciousness) or are very hard to wake up. Call your doctor now or seek immediate medical care if:    · You have moderate trouble breathing.  (You can't speak a full sentence.)     · You are coughing up blood (more than about 1 teaspoon).     · You have signs of low blood pressure. These include feeling lightheaded; being too weak to stand; and having cold, pale, clammy skin. Watch closely for changes in your health, and be sure to contact your doctor if:    · Your symptoms get worse.     · You are not getting better as expected. Call before you go to the doctor's office. Follow their instructions. And wear a cloth face cover. Current as of: July 10, 2020               Content Version: 12.6  © 2006-2020 [employer]. Care instructions adapted under license by [facility] ([facility]). If you have questions about a medical condition or this instruction, always ask your healthcare professional. [address] any warranty or liability for your use of this information.

## 2020-11-11 LAB — SARS-COV-2: DETECTED

## 2020-12-08 ENCOUNTER — NURSE ONLY (OUTPATIENT)
Dept: LAB | Age: 65
End: 2020-12-08

## 2020-12-08 LAB
ANION GAP SERPL CALCULATED.3IONS-SCNC: 13 MEQ/L (ref 8–16)
AVERAGE GLUCOSE: 120 MG/DL (ref 70–126)
BUN BLDV-MCNC: 19 MG/DL (ref 7–22)
CALCIUM SERPL-MCNC: 9.5 MG/DL (ref 8.5–10.5)
CHLORIDE BLD-SCNC: 98 MEQ/L (ref 98–111)
CHOLESTEROL, TOTAL: 191 MG/DL (ref 100–199)
CO2: 32 MEQ/L (ref 23–33)
CREAT SERPL-MCNC: 1.4 MG/DL (ref 0.4–1.2)
CREATININE, URINE: 272 MG/DL
GFR SERPL CREATININE-BSD FRML MDRD: 51 ML/MIN/1.73M2
GLUCOSE BLD-MCNC: 123 MG/DL (ref 70–108)
HBA1C MFR BLD: 6 % (ref 4.4–6.4)
HDLC SERPL-MCNC: 35 MG/DL
LDL CHOLESTEROL CALCULATED: 119 MG/DL
MICROALBUMIN UR-MCNC: 1.82 MG/DL
MICROALBUMIN/CREAT UR-RTO: 7 MG/G (ref 0–30)
POTASSIUM SERPL-SCNC: 3 MEQ/L (ref 3.5–5.2)
SODIUM BLD-SCNC: 143 MEQ/L (ref 135–145)
T4 FREE: 1.25 NG/DL (ref 0.93–1.76)
TRIGL SERPL-MCNC: 183 MG/DL (ref 0–199)
TSH SERPL DL<=0.05 MIU/L-ACNC: 0.82 UIU/ML (ref 0.4–4.2)

## 2020-12-09 ENCOUNTER — OFFICE VISIT (OUTPATIENT)
Dept: FAMILY MEDICINE CLINIC | Age: 65
End: 2020-12-09
Payer: COMMERCIAL

## 2020-12-09 VITALS
WEIGHT: 224.6 LBS | DIASTOLIC BLOOD PRESSURE: 88 MMHG | SYSTOLIC BLOOD PRESSURE: 130 MMHG | RESPIRATION RATE: 14 BRPM | TEMPERATURE: 96.6 F | HEART RATE: 64 BPM | BODY MASS INDEX: 35.18 KG/M2

## 2020-12-09 PROCEDURE — 99214 OFFICE O/P EST MOD 30 MIN: CPT | Performed by: FAMILY MEDICINE

## 2020-12-09 RX ORDER — ROSUVASTATIN CALCIUM 10 MG/1
10 TABLET, COATED ORAL NIGHTLY
Qty: 30 TABLET | Refills: 1 | Status: SHIPPED | OUTPATIENT
Start: 2020-12-09 | End: 2021-06-28 | Stop reason: SDUPTHER

## 2020-12-09 RX ORDER — HYDROXYZINE HYDROCHLORIDE 10 MG/1
10-20 TABLET, FILM COATED ORAL EVERY 6 HOURS PRN
Qty: 40 TABLET | Refills: 0 | Status: SHIPPED | OUTPATIENT
Start: 2020-12-09 | End: 2022-03-07 | Stop reason: SDUPTHER

## 2020-12-09 RX ORDER — POTASSIUM CHLORIDE 20 MEQ/1
60 TABLET, EXTENDED RELEASE ORAL DAILY
Qty: 270 TABLET | Refills: 3 | Status: SHIPPED | OUTPATIENT
Start: 2020-12-09

## 2020-12-09 RX ORDER — LABETALOL 200 MG/1
200 TABLET, FILM COATED ORAL 2 TIMES DAILY
Qty: 180 TABLET | Refills: 3 | Status: SHIPPED | OUTPATIENT
Start: 2020-12-09 | End: 2022-03-07 | Stop reason: SDUPTHER

## 2020-12-09 ASSESSMENT — ENCOUNTER SYMPTOMS
SHORTNESS OF BREATH: 0
BLOOD IN STOOL: 0
NAUSEA: 0
ANAL BLEEDING: 0
CHEST TIGHTNESS: 0
VOMITING: 0
DIARRHEA: 0
ABDOMINAL PAIN: 0
CONSTIPATION: 0

## 2020-12-09 NOTE — PROGRESS NOTES
[facility]  [facility]  Dept: [phone]  Dept Fax: [phone]    RAFAEL Spence is a 63 y.o.male    Pt presents for follow up of IFG, HTN, Hyperlipidemia, Anxiety, GERD, Hyperaldosteronism    Pt feeling ok since last visit- interval history and any new issues noted below:     Glucometer readings at home are not needed. The home BP readings have been in the a little high previously, now 130-140's / 80-90's range. Checking weekly. Pulses 60-70's     Wt Readings from Last 3 Encounters:   12/09/20 224 lb 9.6 oz (101.9 kg)   08/27/20 224 lb (101.6 kg)   08/07/20 224 lb 12.8 oz (102 kg)   Weight unchanged since last visit 4 months ago.      Patient Active Problem List   Diagnosis    Colon polyps    GERD (gastroesophageal reflux disease)    Hyperlipidemia with target LDL less than 100    Anxiety    Hypertension due to endocrine disorder    IFG (impaired fasting glucose)    Primary hyperaldosteronism ([facility]- Dr. Bragg Grade S/P laparoscopic cholecystectomy- 6/9/2018- Dr. Minor Sethi Diastolic dysfunction without heart failure    CKD (chronic kidney disease), stage II    Essential hypertension       Current Outpatient Medications   Medication Sig Dispense Refill    hydrOXYzine (ATARAX) 10 MG tablet Take 1-2 tablets by mouth every 6 hours as needed for Anxiety 40 tablet 0    potassium chloride (KLOR-CON M) 20 MEQ extended release tablet Take 3 tablets by mouth daily 270 tablet 3    labetalol (NORMODYNE) 200 MG tablet Take 1 tablet by mouth 2 times daily 180 tablet 3    rosuvastatin (CRESTOR) 10 MG tablet Take 1 tablet by mouth nightly 30 tablet 1    amLODIPine (NORVASC) 5 MG tablet Take 1 tablet by mouth daily - if your top BP number is less than 110 do not take 90 tablet 3    eplerenone (INSPRA) 50 MG tablet Take 1 tablet by mouth daily 90 tablet 3    chlorthalidone (HYGROTEN) 50 MG tablet Take 1 tablet by mouth daily 90 tablet 3    PARoxetine (PAXIL) 20 MG tablet Take 1 tablet by mouth every morning 90 tablet 3    acetaminophen (TYLENOL) 325 MG tablet Take 650 mg by mouth every 6 hours as needed for Pain       No current facility-administered medications for this visit. Review of Systems   Constitutional: Negative for chills, diaphoresis, fatigue, fever and unexpected weight change. Eyes: Negative for visual disturbance. Respiratory: Negative for chest tightness and shortness of breath. Cardiovascular: Negative for chest pain, palpitations and leg swelling. Gastrointestinal: Negative for abdominal pain, anal bleeding, blood in stool, constipation, diarrhea, nausea and vomiting. Genitourinary: Negative for dysuria and hematuria. Musculoskeletal: Negative for neck pain. Neurological: Negative for dizziness, light-headedness and headaches. OBJECTIVE     /88   Pulse 64   Temp 96.6 °F (35.9 °C) (Temporal)   Resp 14   Wt 224 lb 9.6 oz (101.9 kg)   BMI 35.18 kg/m²   Body mass index is 35.18 kg/m². BP Readings from Last 3 Encounters:   12/09/20 130/88   08/27/20 (!) 129/94   08/07/20 124/82         Physical Exam  Vitals signs and nursing note reviewed. Exam conducted with a chaperone present. Constitutional:       General: He is not in acute distress. Appearance: Normal appearance. He is not ill-appearing, toxic-appearing or diaphoretic. HENT:      Head: Normocephalic and atraumatic. Right Ear: External ear normal.      Left Ear: External ear normal.      Nose: Nose normal. No rhinorrhea. Mouth/Throat:      Mouth: Mucous membranes are moist.      Pharynx: Oropharynx is clear. Eyes:      General: No scleral icterus. Right eye: No discharge. Left eye: No discharge. Extraocular Movements: Extraocular movements intact. Conjunctiva/sclera: Conjunctivae normal.      Pupils: Pupils are equal, round, and reactive to light. Neck:      Musculoskeletal: Normal range of motion.    Cardiovascular: Rate and Rhythm: Normal rate and regular rhythm. Heart sounds: Normal heart sounds. No murmur. No friction rub. No gallop. Pulmonary:      Effort: Pulmonary effort is normal. No respiratory distress. Breath sounds: Normal breath sounds. No stridor. No wheezing, rhonchi or rales. Chest:      Chest wall: No tenderness. Abdominal:      General: Abdomen is flat. Bowel sounds are normal. There is no distension. Palpations: Abdomen is soft. There is no mass. Tenderness: There is no abdominal tenderness. There is no guarding or rebound. Hernia: No hernia is present. Genitourinary:     Comments: KEYSHA deferred today as pt currently asymptomatic. Pt denies dysuria, hematuria, frequency, urgency, difficulty starting/ stopping stream, frequent nocturia    Will reconsider annually. ES    Musculoskeletal: Normal range of motion. General: No swelling, deformity or signs of injury. Skin:     General: Skin is warm and dry. Coloration: Skin is not jaundiced or pale. Findings: No bruising, erythema, lesion or rash. Neurological:      General: No focal deficit present. Mental Status: He is alert and oriented to person, place, and time. Mental status is at baseline. Motor: No weakness. Coordination: Coordination normal.      Gait: Gait normal.   Psychiatric:         Mood and Affect: Mood normal.         Behavior: Behavior normal.         Thought Content:  Thought content normal.         Judgment: Judgment normal.         Component      Latest Ref Rng & Units 12/8/2020   Sodium      135 - 145 meq/L 143   Potassium      3.5 - 5.2 meq/L 3.0 (L)   Chloride      98 - 111 meq/L 98   CO2      23 - 33 meq/L 32   Glucose      70 - 108 mg/dL 123 (H)   BUN      7 - 22 mg/dL 19   Creatinine      0.4 - 1.2 mg/dL 1.4 (H)   Calcium      8.5 - 10.5 mg/dL 9.5   Cholesterol, Total      100 - 199 mg/dL 191   Triglycerides      0 - 199 mg/dL 183   HDL Cholesterol      mg/dL 35   LDL Calculated      mg/dL 119   Microalbumin, Random Urine      mg/dL 1.82   Creatinine, Urine      mg/dL 272.0   Microalb/Creat Ratio      0 - 30 mg/g 7   Hemoglobin A1C      4.4 - 6.4 % 6.0   AVERAGE GLUCOSE      70 - 126 mg/dL 120   TSH      0.400 - 4.200 uIU/mL 0.820   T4 Free      0.93 - 1.76 ng/dL 1.25   Anion Gap      8.0 - 16.0 meq/L 13.0   Est, Glom Filt Rate      ml/min/1.73m2 51 (A)       Lab Results   Component Value Date    LABA1C 6.0 12/08/2020       Lab Results   Component Value Date    CHOL 191 12/08/2020    TRIG 183 12/08/2020    HDL 35 12/08/2020    LDLCALC 119 12/08/2020    LDLDIRECT 116.41 06/22/2020       The 10-year ASCVD risk score (Gloria Velazquez et al., 2013) is: 15.7%    Values used to calculate the score:      Age: 63 years      Sex: Male      Is Non- : No      Diabetic: No      Tobacco smoker: No      Systolic Blood Pressure: 155 mmHg      Is BP treated: Yes      HDL Cholesterol: 35 mg/dL      Total Cholesterol: 191 mg/dL    Lab Results   Component Value Date     12/08/2020    K 3.0 (L) 12/08/2020    CL 98 12/08/2020    CO2 32 12/08/2020    BUN 19 12/08/2020    CREATININE 1.4 (H) 12/08/2020    GLUCOSE 123 (H) 12/08/2020    CALCIUM 9.5 12/08/2020    PROT 7.5 06/22/2020    LABALBU 4.2 06/22/2020    BILITOT 1.1 06/22/2020    ALKPHOS 70 06/22/2020    AST 29 06/22/2020    ALT 29 06/22/2020    LABGLOM 51 (A) 12/08/2020       Lab Results   Component Value Date    LABMICR 1.82 12/08/2020       Lab Results   Component Value Date    TSH 0.820 12/08/2020    T4FREE 1.25 12/08/2020       Lab Results   Component Value Date    WBC 5.7 09/06/2018    HGB 14.9 06/07/2019    HCT 44.2 06/07/2019    MCV 86.9 09/06/2018     09/06/2018       Lab Results   Component Value Date    PSA 1.13 (H) 08/04/2020    PSA 2.28 (H) 06/22/2020    PSA 0.75 06/07/2019       Immunization History   Administered Date(s) Administered    Influenza 10/10/2013    Influenza Vaccine, unspecified formulation 10/10/2013    Tdap (Boostrix, Adacel) 05/01/2018    Zoster Live (Zostavax) 11/11/2013    Zoster Recombinant (Shingrix) 10/10/2018       Health Maintenance   Topic Date Due    Shingles Vaccine (3 of 3) 12/05/2018    Flu vaccine (1) 09/01/2020    Colon cancer screen colonoscopy  04/28/2021    A1C test (Diabetic or Prediabetic)  12/08/2021    Potassium monitoring  12/08/2021    Creatinine monitoring  12/08/2021    Lipid screen  12/08/2025    DTaP/Tdap/Td vaccine (2 - Td) 05/01/2028    Hepatitis C screen  Completed    Hepatitis A vaccine  Aged Out    Hepatitis B vaccine  Aged Out    Hib vaccine  Aged Out    Meningococcal (ACWY) vaccine  Aged Out    Pneumococcal 0-64 years Vaccine  Aged Out    HIV screen  Discontinued       AAA ultrasound (Male, 65-75, smoked ever) indicated at this time? NO, no tobacco history  CT Lung Screen (55-80, 30 pk-yrs, smoking or quit <15 years) indicated at this time? NO, no tobacco history   Sleep Medicine referral indicated at this time (Obesity, Snoring, Daytime Somnolence, Apneic Episodes)? Pt denies symptoms, except snoring    Future Appointments   Date Time Provider Omar Buenoi   2/25/2021  1:40 PM Mckinley Oliveira MD [email or web address]   6/28/2021  2:30 PM Reece Chaney MD [address] [place] [address]       ASSESSMENT       Diagnosis Orders   1. IFG (impaired fasting glucose)  Comprehensive Metabolic Panel    Hemoglobin A1C   2. Hypertension due to endocrine disorder  Comprehensive Metabolic Panel    potassium chloride (KLOR-CON M) 20 MEQ extended release tablet    labetalol (NORMODYNE) 200 MG tablet   3. Hyperlipidemia with target LDL less than 100  HDL Cholesterol    LDL Cholesterol, Direct    Comprehensive Metabolic Panel    rosuvastatin (CRESTOR) 10 MG tablet    Triglyceride   4. CKD (chronic kidney disease), stage II     5. Anxiety  hydrOXYzine (ATARAX) 10 MG tablet   6. Primary hyperaldosteronism ([facility]- Dr. Lorrie Cogan     7.  Hyperaldosteronism- Dr. Willis Hoskins     8. Gastroesophageal reflux disease without esophagitis     9. Screening PSA (prostate specific antigen)  Psa screening       PLAN      After discussion with pt, will increase Labetalol to 200 mg- 1 pill 2x/day. #180/ 3 refills. Call if pulse < 55 regularly or if dizzy/lightheaded. Start Crestor 10 mg- 1 pill daily at this time  #30/1 refill  Increase K-Dur to 20 mEq- 3 pills daily. #270/3 refills. Check TG, HDL, D-LDL, CMP in 6 weeks  Check HGA1C and PSA in 6 months. Follow up as scheduled with Dr. Josh Becker 12/9/2020)   Continue increased water intake at > 96 ounces daily. Continue to work on diet, exercise (30 minutes, 5x/week), and weight loss for optimal cardiovascular health.    Continue current medicines  Refills  Follow up in 6 months         Preventive Health Topics:  Encouraged annual FLU VACCINE- pt declines (updated 12/9/2020)  [address] at this time- please get per [facility])  COLONOSCOPY done 4/28/2018 per Dr. Venita Rodriguez- + Polyps- To do again in 4/2023 (updated 12/9/2020)  [address] done annually per Maty Ly- completed 3/2020- all ok- to follow up annually.  (updated 12/9/2020)          Electronically signed by Mariah Souza MD on 12/9/2020 at 3:42 PM

## 2020-12-09 NOTE — PATIENT INSTRUCTIONS
After discussion with pt, will increase Labetalol to 200 mg- 1 pill 2x/day. #180/ 3 refills. Call if pulse < 55 regularly or if dizzy/lightheaded. Start Crestor 10 mg- 1 pill daily at this time  #30/1 refill  Increase K-Dur to 20 mEq- 3 pills daily. #270/3 refills. Check TG, HDL, D-LDL, CMP in 6 weeks  Check HGA1C and PSA in 6 months. Follow up as scheduled with Dr. Mel Roger 12/9/2020)   Continue increased water intake at > 96 ounces daily. Continue to work on diet, exercise (30 minutes, 5x/week), and weight loss for optimal cardiovascular health.    Continue current medicines  Refills  Follow up in 6 months         Preventive Health Topics:  Encouraged annual FLU VACCINE- pt declines (updated 12/9/2020)  [address] at this time- please get per [facility])  COLONOSCOPY done 4/28/2018 per Dr. Shayne Grossman- + Polyps- To do again in 4/2023 (updated 12/9/2020)  [address] done annually per [facility]- completed 3/2020- all ok- to follow up annually.  (updated 12/9/2020)

## 2020-12-10 ENCOUNTER — TELEPHONE (OUTPATIENT)
Dept: FAMILY MEDICINE CLINIC | Age: 65
End: 2020-12-10

## 2020-12-10 LAB — SARS-COV-2 ANTIBODY, TOTAL: POSITIVE

## 2020-12-10 NOTE — TELEPHONE ENCOUNTER
Noted.  Yes, this means he had the virus at some point. No new instructions. However, Pt can be reinfected 3 months after having COVID. It is difficult to say when pt had COVID. If pt has symptoms again, he would need to be tested and isolate and quarantine again.   Thanks -MICHELL

## 2020-12-10 NOTE — TELEPHONE ENCOUNTER
Discussed with pt. He will pull results from his [employer] account. States that he needs this for plasma donation.

## 2020-12-10 NOTE — TELEPHONE ENCOUNTER
Just to clarify, this is the antibody test, so he had the virus? Any different instructions? (he was tested and positive on 11/9/20)      ----- Message from ANNE MARIE Brar CNP sent at 12/10/2020  7:28 AM EST -----  Please let pt know that COVID testing was positive. He needs to isolate for 10 days from symptoms start. Contacts need to quarantine for 10 days from contact. The health department will also be contacting him.   Thanks -WS

## 2021-02-23 ENCOUNTER — NURSE ONLY (OUTPATIENT)
Dept: LAB | Age: 66
End: 2021-02-23

## 2021-02-23 DIAGNOSIS — N18.2 CKD (CHRONIC KIDNEY DISEASE), STAGE II: ICD-10-CM

## 2021-02-23 LAB
ANION GAP SERPL CALCULATED.3IONS-SCNC: 9 MEQ/L (ref 8–16)
BUN BLDV-MCNC: 17 MG/DL (ref 7–22)
CALCIUM SERPL-MCNC: 9.6 MG/DL (ref 8.5–10.5)
CHLORIDE BLD-SCNC: 102 MEQ/L (ref 98–111)
CO2: 33 MEQ/L (ref 23–33)
CREAT SERPL-MCNC: 1.4 MG/DL (ref 0.4–1.2)
GFR SERPL CREATININE-BSD FRML MDRD: 51 ML/MIN/1.73M2
GLUCOSE BLD-MCNC: 101 MG/DL (ref 70–108)
POTASSIUM SERPL-SCNC: 3.7 MEQ/L (ref 3.5–5.2)
SODIUM BLD-SCNC: 144 MEQ/L (ref 135–145)

## 2021-02-25 ENCOUNTER — OFFICE VISIT (OUTPATIENT)
Dept: NEPHROLOGY | Age: 66
End: 2021-02-25
Payer: COMMERCIAL

## 2021-02-25 VITALS
WEIGHT: 227.4 LBS | SYSTOLIC BLOOD PRESSURE: 126 MMHG | DIASTOLIC BLOOD PRESSURE: 90 MMHG | OXYGEN SATURATION: 95 % | HEART RATE: 70 BPM | TEMPERATURE: 97.3 F | BODY MASS INDEX: 35.62 KG/M2

## 2021-02-25 DIAGNOSIS — N18.31 STAGE 3A CHRONIC KIDNEY DISEASE (HCC): Primary | ICD-10-CM

## 2021-02-25 DIAGNOSIS — I10 ESSENTIAL HYPERTENSION: ICD-10-CM

## 2021-02-25 PROCEDURE — 99212 OFFICE O/P EST SF 10 MIN: CPT | Performed by: INTERNAL MEDICINE

## 2021-02-25 NOTE — PROGRESS NOTES
Renal Progress Note    Assessment and Plan:      Diagnosis Orders   1. Stage 3a chronic kidney disease     2. Essential hypertension       PLAN:   Lab result discussed with the patient. He understood. Addressed his questions. Serum creatinine is increased to 1.4 mg/dL from 1.2 mg/dL 6 months ago. This is however within his baseline still. It was 1.4 mg/dL back in May 2013 and also in July 2018 as well as a June 2019 respectively. Medications reviewed  No changes   Continue to avoid non steroidal anti inflammatory drugs  Return visit in 12 months with labs     Patient Active Problem List   Diagnosis    Colon polyps    GERD (gastroesophageal reflux disease)    Hyperlipidemia with target LDL less than 100    Anxiety    Hypertension due to endocrine disorder    IFG (impaired fasting glucose)    [facility] Dr. Margot Grossman S/P laparoscopic cholecystectomy- 6/9/2018- Dr. Cortez Kelley Diastolic dysfunction without heart failure    CKD (chronic kidney disease), stage II    Essential hypertension       Subjective:   Chief complaint:  Chief Complaint   Patient presents with    Chronic Kidney Disease     Stage IIIa    Hypertension      HPI:This is a follow up visit for Mr. Jillian Ni who is here today for return appointment. I see him for chronic kidney disease. He was last seen about 6 months ago. Doing well with no complaint. Denies any chest pain recently. No nausea vomiting. No shortness of breath. No difficulties with urination. Urine output is good. He does have chronic anxiety however. He felt weak yesterday after work. He laid down and went to sleep. He became fine after he woke up. He did not check his blood pressure to see if it was low. ROS:  Pertinent positives stated above in HPI. All other systems were reviewed and were negative.   Medications:     Current Outpatient Medications   Medication Sig Dispense Refill  hydrOXYzine (ATARAX) 10 MG tablet Take 1-2 tablets by mouth every 6 hours as needed for Anxiety 40 tablet 0    potassium chloride (KLOR-CON M) 20 MEQ extended release tablet Take 3 tablets by mouth daily 270 tablet 3    labetalol (NORMODYNE) 200 MG tablet Take 1 tablet by mouth 2 times daily (Patient taking differently: Take 200 mg by mouth daily ) 180 tablet 3    rosuvastatin (CRESTOR) 10 MG tablet Take 1 tablet by mouth nightly 30 tablet 1    amLODIPine (NORVASC) 5 MG tablet Take 1 tablet by mouth daily - if your top BP number is less than 110 do not take 90 tablet 3    eplerenone (INSPRA) 50 MG tablet Take 1 tablet by mouth daily 90 tablet 3    chlorthalidone (HYGROTEN) 50 MG tablet Take 1 tablet by mouth daily 90 tablet 3    PARoxetine (PAXIL) 20 MG tablet Take 1 tablet by mouth every morning 90 tablet 3    acetaminophen (TYLENOL) 325 MG tablet Take 650 mg by mouth every 6 hours as needed for Pain       No current facility-administered medications for this visit.         Lab Results:    CBC:   Lab Results   Component Value Date    WBC 5.7 09/06/2018    HGB 14.9 06/07/2019    HCT 44.2 06/07/2019    MCV 86.9 09/06/2018     09/06/2018     BMP:    Lab Results   Component Value Date     02/23/2021     12/08/2020     08/04/2020    K 3.7 02/23/2021    K 3.0 (L) 12/08/2020    K 3.6 09/03/2020     02/23/2021    CL 98 12/08/2020    CL 99 08/04/2020    CO2 33 02/23/2021    CO2 32 12/08/2020    CO2 27 08/04/2020    BUN 17 02/23/2021    BUN 19 12/08/2020    BUN 15 08/04/2020    CREATININE 1.4 (H) 02/23/2021    CREATININE 1.4 (H) 12/08/2020    CREATININE 1.2 08/04/2020    GLUCOSE 101 02/23/2021    GLUCOSE 123 (H) 12/08/2020    GLUCOSE 109 (H) 08/04/2020      Hepatic:   Lab Results   Component Value Date    AST 29 06/22/2020    AST 32 12/12/2019    AST 27 06/07/2019    ALT 29 06/22/2020    ALT 41 12/12/2019    ALT 35 06/07/2019    BILITOT 1.1 06/22/2020    BILITOT 0.9 12/12/2019 BILITOT 1.4 (H) 06/07/2019    ALKPHOS 70 06/22/2020    ALKPHOS 59 12/12/2019    ALKPHOS 64 06/07/2019     BNP: No results found for: BNP  Lipids:   Lab Results   Component Value Date    CHOL 191 12/08/2020    HDL 35 12/08/2020     INR:   Lab Results   Component Value Date    INR 0.94 09/06/2018    INR 1.24 (H) 07/23/2018    INR 1.00 07/16/2018     URINE:   Lab Results   Component Value Date    NAUR 139 11/02/2011    NAUR 170 11/02/2011    PROTUR 24.0 02/20/2012     Lab Results   Component Value Date    NITRU NEGATIVE 07/16/2019    COLORU YELLOW 07/16/2019    PHUR 7.5 07/16/2019    LABCAST 0-4 HYALINE 07/23/2018    LABCAST NONE SEEN 07/23/2018    WBCUA 2-4 07/16/2019    RBCUA 0-2 07/16/2019    YEAST NONE SEEN 07/16/2019    BACTERIA NONE 07/16/2019    CLARITYU clear 06/03/2016    SPECGRAV 1.028 07/23/2018    LEUKOCYTESUR TRACE 07/16/2019    UROBILINOGEN 1.0 07/16/2019    BILIRUBINUR NEGATIVE 07/16/2019    BILIRUBINUR neg 06/03/2016    BILIRUBINUR NEGATIVE 10/20/2011    BLOODU NEGATIVE 07/16/2019    GLUCOSEU NEGATIVE 07/16/2019    KETUA NEGATIVE 07/16/2019      Microalbumen/Creatinine ratio:  No components found for: RUCREAT    Objective:   Vitals: BP (!) 126/90 (Site: Left Upper Arm, Position: Sitting, Cuff Size: Large Adult)   Pulse 70   Temp 97.3 °F (36.3 °C)   Wt 227 lb 6.4 oz (103.1 kg)   SpO2 95%   BMI 35.62 kg/m²      Constitutional:  Alert, awake, no apparent distress  Skin:normal with no rash or any lesions  HEENT:Pupils are reactive . Throat is clear. Oral mucosa is moist.  Neck:supple with no thyromegaly or bruit   Cardiovascular:  S1, S2 without murmur   Respiratory:  Clear to auscultation with no wheezes or rales  Abdomen: +bowel sound, soft, non tender and no bruit  Ext: No LE edema  Musculoskeletal:Intact  Neuro:Alert, awake and oriented with no obvious focal deficit.   Speech is normal.    Electronically signed by Isra Philip MD on 2/25/2021 at 1:46 PM **This report has been created using voice recognition software. It maycontain minor  errors which are inherent in voice recognition technology. **

## 2021-06-23 ENCOUNTER — NURSE ONLY (OUTPATIENT)
Dept: LAB | Age: 66
End: 2021-06-23

## 2021-06-23 DIAGNOSIS — I15.2 HYPERTENSION DUE TO ENDOCRINE DISORDER: ICD-10-CM

## 2021-06-23 DIAGNOSIS — R73.01 IFG (IMPAIRED FASTING GLUCOSE): ICD-10-CM

## 2021-06-23 DIAGNOSIS — E78.5 HYPERLIPIDEMIA WITH TARGET LDL LESS THAN 100: ICD-10-CM

## 2021-06-23 LAB
ALBUMIN SERPL-MCNC: 4.5 G/DL (ref 3.5–5.1)
ALP BLD-CCNC: 66 U/L (ref 38–126)
ALT SERPL-CCNC: 26 U/L (ref 11–66)
ANION GAP SERPL CALCULATED.3IONS-SCNC: 10 MEQ/L (ref 8–16)
AST SERPL-CCNC: 21 U/L (ref 5–40)
BILIRUB SERPL-MCNC: 1.4 MG/DL (ref 0.3–1.2)
BUN BLDV-MCNC: 13 MG/DL (ref 7–22)
CALCIUM SERPL-MCNC: 9.7 MG/DL (ref 8.5–10.5)
CHLORIDE BLD-SCNC: 101 MEQ/L (ref 98–111)
CO2: 32 MEQ/L (ref 23–33)
CREAT SERPL-MCNC: 1.2 MG/DL (ref 0.4–1.2)
GFR SERPL CREATININE-BSD FRML MDRD: 61 ML/MIN/1.73M2
GLUCOSE BLD-MCNC: 116 MG/DL (ref 70–108)
HDLC SERPL-MCNC: 41 MG/DL
LDL CHOLESTEROL DIRECT: 122.13 MG/DL
POTASSIUM SERPL-SCNC: 3.4 MEQ/L (ref 3.5–5.2)
SODIUM BLD-SCNC: 143 MEQ/L (ref 135–145)
TOTAL PROTEIN: 7.5 G/DL (ref 6.1–8)
TRIGL SERPL-MCNC: 207 MG/DL (ref 0–199)

## 2021-06-26 NOTE — PROGRESS NOTES
[facility]  [facility]  Dept: [phone]  Dept Fax: [phone]    RAFAEL Pratt is a 61 y.o.male    Pt presents for follow up of IFG, HTN, Hyperlipidemia, Anxiety, GERD, Hyperaldosteronism    Pt feeling ok since last visit- interval history and any new issues noted below:     Patient continues seeing Dr. Melissa Bahena for stage III CKD and hypertension- last appt 2/25/2021- to follow up 2/24/2022 as testing all stable. Glucometer readings at home are not needed. The home BP readings have been in the <140 / 85-90 range, rarely 180/110-120 (on missed med days). Wt Readings from Last 3 Encounters:   06/28/21 228 lb (103.4 kg)   02/25/21 227 lb 6.4 oz (103.1 kg)   12/09/20 224 lb 9.6 oz (101.9 kg)   Weight increased 4# since last visit 6 months ago. Pt admits to dietary indiscretion since last visit. Pt unable to walk due to back and leg pain (seeing Chiropractor and had MRI)- to follow up next week. Pt states pain is better at this time from previous- in decompression treatment with Dr. Arie Enciso at this time.      Patient Active Problem List   Diagnosis    Colon polyps    GERD (gastroesophageal reflux disease)    Hyperlipidemia with target LDL less than 100    Anxiety    Hypertension due to endocrine disorder    IFG (impaired fasting glucose)    Primary hyperaldosteronism ([facility]- Dr. Slava Saini S/P laparoscopic cholecystectomy- 6/9/2018- Dr. Jorge Lynn Diastolic dysfunction without heart failure    CKD (chronic kidney disease), stage II    Essential hypertension       Current Outpatient Medications   Medication Sig Dispense Refill    PARoxetine (PAXIL) 20 MG tablet Take 1 tablet by mouth every morning 90 tablet 3    chlorthalidone (HYGROTEN) 50 MG tablet Take 1 tablet by mouth daily 90 tablet 3    eplerenone (INSPRA) 50 MG tablet Take 1 tablet by mouth daily 90 tablet 3    amLODIPine (NORVASC) 5 MG tablet Take 1 tablet by mouth daily - if your top BP number is less than 110 do not take 90 tablet 3    rosuvastatin (CRESTOR) 10 MG tablet Take 1 tablet by mouth nightly 90 tablet 3    hydrOXYzine (ATARAX) 10 MG tablet Take 1-2 tablets by mouth every 6 hours as needed for Anxiety 40 tablet 0    potassium chloride (KLOR-CON M) 20 MEQ extended release tablet Take 3 tablets by mouth daily 270 tablet 3    labetalol (NORMODYNE) 200 MG tablet Take 1 tablet by mouth 2 times daily (Patient taking differently: Take 200 mg by mouth daily ) 180 tablet 3    acetaminophen (TYLENOL) 325 MG tablet Take 650 mg by mouth every 6 hours as needed for Pain       No current facility-administered medications for this visit. Review of Systems   Constitutional: Negative for chills, diaphoresis, fatigue, fever and unexpected weight change. Eyes: Negative for visual disturbance. Respiratory: Positive for shortness of breath (slight with exertion). Negative for chest tightness. Cardiovascular: Negative for chest pain, palpitations and leg swelling. Gastrointestinal: Negative for abdominal pain, anal bleeding, blood in stool, constipation, diarrhea, nausea and vomiting. Genitourinary: Negative for dysuria and hematuria. Musculoskeletal: Negative for neck pain. Neurological: Positive for dizziness (\"very slight\"). Negative for light-headedness and headaches. OBJECTIVE     /78 (Site: Right Upper Arm)   Pulse 64   Temp 98 °F (36.7 °C) (Oral)   Resp 20   Wt 228 lb (103.4 kg)   BMI 35.71 kg/m²   Body mass index is 35.71 kg/m². BP Readings from Last 3 Encounters:   06/28/21 134/78   02/25/21 (!) 126/90   12/09/20 130/88     Physical Exam  Vitals and nursing note reviewed. Exam conducted with a chaperone present. Constitutional:       General: He is not in acute distress. Appearance: Normal appearance. He is not ill-appearing, toxic-appearing or diaphoretic. HENT:      Head: Normocephalic and atraumatic.       Right Ear: External ear normal.      Left Ear: External ear normal.      Nose: Nose normal. No rhinorrhea. Mouth/Throat:      Mouth: Mucous membranes are moist.      Pharynx: Oropharynx is clear. Eyes:      General: No scleral icterus. Right eye: No discharge. Left eye: No discharge. Extraocular Movements: Extraocular movements intact. Conjunctiva/sclera: Conjunctivae normal.      Pupils: Pupils are equal, round, and reactive to light. Cardiovascular:      Rate and Rhythm: Normal rate and regular rhythm. Heart sounds: Normal heart sounds. No murmur heard. No friction rub. No gallop. Pulmonary:      Effort: Pulmonary effort is normal. No respiratory distress. Breath sounds: Normal breath sounds. No stridor. No wheezing, rhonchi or rales. Chest:      Chest wall: No tenderness. Abdominal:      General: Abdomen is flat. Bowel sounds are normal. There is no distension. Palpations: Abdomen is soft. There is no mass. Tenderness: There is no abdominal tenderness. There is no guarding or rebound. Hernia: No hernia is present. Genitourinary:     Comments: KEYSHA deferred today as pt currently asymptomatic. Pt denies dysuria, hematuria, frequency, urgency, difficulty starting/ stopping stream, frequent nocturia    Will reconsider annually. ES    Musculoskeletal:         General: No swelling, deformity or signs of injury. Normal range of motion. Cervical back: Normal range of motion. Skin:     General: Skin is warm and dry. Coloration: Skin is not jaundiced or pale. Findings: No bruising, erythema, lesion or rash. Neurological:      General: No focal deficit present. Mental Status: He is alert and oriented to person, place, and time. Mental status is at baseline. Motor: No weakness. Coordination: Coordination normal.      Gait: Gait normal.   Psychiatric:         Mood and Affect: Mood normal.         Behavior: Behavior normal.         Thought Content:  Thought content 12/08/2020       Lab Results   Component Value Date    WBC 5.7 09/06/2018    HGB 14.9 06/07/2019    HCT 44.2 06/07/2019    MCV 86.9 09/06/2018     09/06/2018       Lab Results   Component Value Date    PSA 1.13 (H) 08/04/2020    PSA 2.28 (H) 06/22/2020    PSA 0.75 06/07/2019       Immunization History   Administered Date(s) Administered    Influenza 10/10/2013    Influenza Vaccine, unspecified formulation 10/10/2013    Tdap (Boostrix, Adacel) 05/01/2018    Zoster Live (Zostavax) 11/11/2013    Zoster Recombinant (Shingrix) 10/10/2018, 06/23/2021       Health Maintenance   Topic Date Due    COVID-19 Vaccine (1) Never done    Flu vaccine (Season Ended) 09/01/2021    A1C test (Diabetic or Prediabetic)  12/08/2021    Lipid screen  06/23/2022    Potassium monitoring  06/23/2022    Creatinine monitoring  06/23/2022    Colon cancer screen colonoscopy  04/28/2023    DTaP/Tdap/Td vaccine (2 - Td or Tdap) 05/01/2028    Shingles Vaccine  Completed    Hepatitis C screen  Completed    Hepatitis A vaccine  Aged Out    Hepatitis B vaccine  Aged Out    Hib vaccine  Aged Out    Meningococcal (ACWY) vaccine  Aged Out    Pneumococcal 0-64 years Vaccine  Aged Out    HIV screen  Discontinued       AAA ultrasound (Male, 65-75, smoked ever) indicated at this time? No tobacco history  CT Lung Screen (50-80, 20 pk-yrs, smoking or quit <15 years) indicated at this time? No tobacco history  Sleep Medicine referral indicated at this time (Obesity, Snoring, Daytime Somnolence, Apneic Episodes)? Patient denies any current symptoms, except snoring    Future Appointments   Date Time Provider Omar Teresa   2/24/2022  1:20 PM MD WILMER Michael [facility]. [facility]       ASSESSMENT       Diagnosis Orders   1.  IFG (impaired fasting glucose)  Hemoglobin A1C    Comprehensive Metabolic Panel    Hemoglobin A1C    Lipid Panel    Comprehensive Metabolic Panel    Microalbumin / Creatinine Urine Ratio    T4, Free TSH without Reflex    CBC Auto Differential   2. Hypertension due to endocrine disorder  Comprehensive Metabolic Panel    Lipid Panel    Comprehensive Metabolic Panel    T4, Free    TSH without Reflex    CBC Auto Differential    chlorthalidone (HYGROTEN) 50 MG tablet    eplerenone (INSPRA) 50 MG tablet    amLODIPine (NORVASC) 5 MG tablet   3. Hyperlipidemia with target LDL less than 100  Triglyceride    LDL Cholesterol, Direct    Comprehensive Metabolic Panel    Lipid Panel    Comprehensive Metabolic Panel    CBC Auto Differential    rosuvastatin (CRESTOR) 10 MG tablet   4. CKD (chronic kidney disease), stage II  CBC Auto Differential   5. Anxiety  CBC Auto Differential    PARoxetine (PAXIL) 20 MG tablet   6. Primary hyperaldosteronism (HCC)  CBC Auto Differential   7. Gastroesophageal reflux disease without esophagitis  CBC Auto Differential   8. Screening PSA (prostate specific antigen)  PSA screening    CBC Auto Differential       PLAN      Consider referral to Ortho/ Neurosurgery/ PT if symptoms persist despite Chiropractic care for known disc issues with foraminal stenosis. Change Labetalol to 200 mg- 1/2 pill 2x/day, rather than taking 200 mg daily at one time. Home BP's- Call if > 140/90 on a regular basis. Restart Crestor 10 mg1 daily.   #90/3 refill  Check HG A1c, TG, DLDL, CMP, and PSA in 6 weeks  Check HG A1c, FLP, CMP, spot MA, free T4/TSH, and CBC in 8 months   Follow up as scheduled with Dr. Sterling Mehta 6/28/2021)  Continue to work on diet, exercise (30 minutes, 5x/week), and weight loss for optimal cardiovascular health.    Continue current medicines  Refills  Follow up in 8 months            Preventive Health Topics:  Encouraged [address]- pt declines   Encouraged annual FLU VACCINE- pt declines   COLONOSCOPY done 4/28/2018 per Dr. Tejada Flow- + Polyps- To do again in 4/2023 (updated 6/28/2021)  [address] done annually per [facility]- completed late 4/2021- all ok- to follow up annually.  (updated 6/28/2021)       Electronically signed by Latrice Child MD on 6/28/2021 at 3:55 PM

## 2021-06-28 ENCOUNTER — TELEPHONE (OUTPATIENT)
Dept: FAMILY MEDICINE CLINIC | Age: 66
End: 2021-06-28

## 2021-06-28 ENCOUNTER — OFFICE VISIT (OUTPATIENT)
Dept: FAMILY MEDICINE CLINIC | Age: 66
End: 2021-06-28
Payer: COMMERCIAL

## 2021-06-28 VITALS
DIASTOLIC BLOOD PRESSURE: 78 MMHG | TEMPERATURE: 98 F | SYSTOLIC BLOOD PRESSURE: 134 MMHG | RESPIRATION RATE: 20 BRPM | HEART RATE: 64 BPM | WEIGHT: 228 LBS | BODY MASS INDEX: 35.71 KG/M2

## 2021-06-28 DIAGNOSIS — N18.2 CKD (CHRONIC KIDNEY DISEASE), STAGE II: ICD-10-CM

## 2021-06-28 DIAGNOSIS — I15.2 HYPERTENSION DUE TO ENDOCRINE DISORDER: ICD-10-CM

## 2021-06-28 DIAGNOSIS — K21.9 GASTROESOPHAGEAL REFLUX DISEASE WITHOUT ESOPHAGITIS: ICD-10-CM

## 2021-06-28 DIAGNOSIS — E78.5 HYPERLIPIDEMIA WITH TARGET LDL LESS THAN 100: ICD-10-CM

## 2021-06-28 DIAGNOSIS — Z12.5 SCREENING PSA (PROSTATE SPECIFIC ANTIGEN): ICD-10-CM

## 2021-06-28 DIAGNOSIS — R73.01 IFG (IMPAIRED FASTING GLUCOSE): Primary | ICD-10-CM

## 2021-06-28 DIAGNOSIS — E26.09 PRIMARY HYPERALDOSTERONISM (HCC): ICD-10-CM

## 2021-06-28 DIAGNOSIS — F41.9 ANXIETY: ICD-10-CM

## 2021-06-28 PROCEDURE — 99214 OFFICE O/P EST MOD 30 MIN: CPT | Performed by: FAMILY MEDICINE

## 2021-06-28 RX ORDER — AMLODIPINE BESYLATE 5 MG/1
5 TABLET ORAL DAILY
Qty: 90 TABLET | Refills: 3 | Status: SHIPPED | OUTPATIENT
Start: 2021-06-28

## 2021-06-28 RX ORDER — EPLERENONE 50 MG/1
50 TABLET, FILM COATED ORAL DAILY
Qty: 90 TABLET | Refills: 3 | Status: SHIPPED | OUTPATIENT
Start: 2021-06-28

## 2021-06-28 RX ORDER — PAROXETINE HYDROCHLORIDE 20 MG/1
20 TABLET, FILM COATED ORAL EVERY MORNING
Qty: 90 TABLET | Refills: 3 | Status: SHIPPED | OUTPATIENT
Start: 2021-06-28

## 2021-06-28 RX ORDER — CHLORTHALIDONE 50 MG/1
50 TABLET ORAL DAILY
Qty: 90 TABLET | Refills: 3 | Status: SHIPPED | OUTPATIENT
Start: 2021-06-28

## 2021-06-28 RX ORDER — ROSUVASTATIN CALCIUM 10 MG/1
10 TABLET, COATED ORAL NIGHTLY
Qty: 90 TABLET | Refills: 3 | Status: SHIPPED | OUTPATIENT
Start: 2021-06-28 | End: 2022-03-07

## 2021-06-28 ASSESSMENT — ENCOUNTER SYMPTOMS
BLOOD IN STOOL: 0
SHORTNESS OF BREATH: 1
VOMITING: 0
ABDOMINAL PAIN: 0
ANAL BLEEDING: 0
CONSTIPATION: 0
NAUSEA: 0
CHEST TIGHTNESS: 0
DIARRHEA: 0

## 2021-06-28 ASSESSMENT — PATIENT HEALTH QUESTIONNAIRE - PHQ9
SUM OF ALL RESPONSES TO PHQ9 QUESTIONS 1 & 2: 0
SUM OF ALL RESPONSES TO PHQ QUESTIONS 1-9: 0
1. LITTLE INTEREST OR PLEASURE IN DOING THINGS: 0
SUM OF ALL RESPONSES TO PHQ QUESTIONS 1-9: 0
SUM OF ALL RESPONSES TO PHQ QUESTIONS 1-9: 0
2. FEELING DOWN, DEPRESSED OR HOPELESS: 0

## 2021-06-28 NOTE — TELEPHONE ENCOUNTER
Pt is here for an appt and stated that he got his shingles vaccine at [facility] on [address]. He called them to see if they could fax over his record of it but they stated they don't normally do that and to have someone from the office call and they can give us the dates and everything over the phone so it can be charted.    [phone]

## 2021-06-28 NOTE — PATIENT INSTRUCTIONS
Consider referral to Ortho/ Neurosurgery if symptoms persist despite Chiropractic care for known disc issues with foraminal stenosis. Change Labetalol to 200 mg- 1/2 pill 2x/day, rather than taking 200 mg daily at one time. Home BP's- Call if > 140/90 on a regular basis. Restart Crestor 10 mg1 daily. #90/3 refill  Check HG A1c, TG, DLDL, CMP, and PSA in 6 weeks  Check HG A1c, FLP, CMP, spot MA, free T4/TSH, and CBC in 8 months   Follow up as scheduled with Dr. Clayton Hernández 6/28/2021)  Continue to work on diet, exercise (30 minutes, 5x/week), and weight loss for optimal cardiovascular health.    Continue current medicines  Refills  Follow up in 8 months                Preventive Health Topics:  Encouraged [address]- pt declines   Encouraged annual FLU VACCINE- pt declines   COLONOSCOPY done 4/28/2018 per Dr. Tosha Silva- + Polyps- To do again in 4/2023 (updated 6/28/2021)  [address] done annually per [facility]- completed late 4/2021- all ok- to follow up annually.  (updated 6/28/2021)

## 2022-02-21 ENCOUNTER — NURSE ONLY (OUTPATIENT)
Dept: LAB | Age: 67
End: 2022-02-21

## 2022-02-21 DIAGNOSIS — Z12.5 SCREENING PSA (PROSTATE SPECIFIC ANTIGEN): ICD-10-CM

## 2022-02-21 DIAGNOSIS — E78.5 HYPERLIPIDEMIA WITH TARGET LDL LESS THAN 100: ICD-10-CM

## 2022-02-21 DIAGNOSIS — K21.9 GASTROESOPHAGEAL REFLUX DISEASE WITHOUT ESOPHAGITIS: ICD-10-CM

## 2022-02-21 DIAGNOSIS — R73.01 IFG (IMPAIRED FASTING GLUCOSE): ICD-10-CM

## 2022-02-21 DIAGNOSIS — N18.31 STAGE 3A CHRONIC KIDNEY DISEASE (HCC): ICD-10-CM

## 2022-02-21 DIAGNOSIS — E26.09 PRIMARY HYPERALDOSTERONISM (HCC): ICD-10-CM

## 2022-02-21 DIAGNOSIS — F41.9 ANXIETY: ICD-10-CM

## 2022-02-21 DIAGNOSIS — N18.2 CKD (CHRONIC KIDNEY DISEASE), STAGE II: ICD-10-CM

## 2022-02-21 DIAGNOSIS — I15.2 HYPERTENSION DUE TO ENDOCRINE DISORDER: ICD-10-CM

## 2022-02-21 LAB
ALBUMIN SERPL-MCNC: 4.8 G/DL (ref 3.5–5.1)
ALP BLD-CCNC: 76 U/L (ref 38–126)
ALT SERPL-CCNC: 29 U/L (ref 11–66)
ANION GAP SERPL CALCULATED.3IONS-SCNC: 13 MEQ/L (ref 8–16)
AST SERPL-CCNC: 22 U/L (ref 5–40)
AVERAGE GLUCOSE: 123 MG/DL (ref 70–126)
BASOPHILS # BLD: 0.9 %
BASOPHILS ABSOLUTE: 0.1 THOU/MM3 (ref 0–0.1)
BILIRUB SERPL-MCNC: 1.5 MG/DL (ref 0.3–1.2)
BUN BLDV-MCNC: 20 MG/DL (ref 7–22)
CALCIUM SERPL-MCNC: 9.9 MG/DL (ref 8.5–10.5)
CHLORIDE BLD-SCNC: 98 MEQ/L (ref 98–111)
CHOLESTEROL, TOTAL: 200 MG/DL (ref 100–199)
CO2: 30 MEQ/L (ref 23–33)
CREAT SERPL-MCNC: 1.3 MG/DL (ref 0.4–1.2)
CREATININE, URINE: 340.6 MG/DL
EOSINOPHIL # BLD: 2 %
EOSINOPHILS ABSOLUTE: 0.1 THOU/MM3 (ref 0–0.4)
ERYTHROCYTE [DISTWIDTH] IN BLOOD BY AUTOMATED COUNT: 14.4 % (ref 11.5–14.5)
ERYTHROCYTE [DISTWIDTH] IN BLOOD BY AUTOMATED COUNT: 46.2 FL (ref 35–45)
GFR SERPL CREATININE-BSD FRML MDRD: 55 ML/MIN/1.73M2
GLUCOSE BLD-MCNC: 114 MG/DL (ref 70–108)
HBA1C MFR BLD: 6.1 % (ref 4.4–6.4)
HCT VFR BLD CALC: 49.5 % (ref 42–52)
HDLC SERPL-MCNC: 42 MG/DL
HEMOGLOBIN: 16.1 GM/DL (ref 14–18)
IMMATURE GRANS (ABS): 0.01 THOU/MM3 (ref 0–0.07)
IMMATURE GRANULOCYTES: 0.1 %
LDL CHOLESTEROL CALCULATED: 126 MG/DL
LYMPHOCYTES # BLD: 38.8 %
LYMPHOCYTES ABSOLUTE: 2.7 THOU/MM3 (ref 1–4.8)
MCH RBC QN AUTO: 29 PG (ref 26–33)
MCHC RBC AUTO-ENTMCNC: 32.5 GM/DL (ref 32.2–35.5)
MCV RBC AUTO: 89 FL (ref 80–94)
MICROALBUMIN UR-MCNC: 5.73 MG/DL
MICROALBUMIN/CREAT UR-RTO: 17 MG/G (ref 0–30)
MONOCYTES # BLD: 9.8 %
MONOCYTES ABSOLUTE: 0.7 THOU/MM3 (ref 0.4–1.3)
NUCLEATED RED BLOOD CELLS: 0 /100 WBC
PLATELET # BLD: 265 THOU/MM3 (ref 130–400)
PMV BLD AUTO: 10.7 FL (ref 9.4–12.4)
POTASSIUM SERPL-SCNC: 4.1 MEQ/L (ref 3.5–5.2)
PTH INTACT: 82.4 PG/ML (ref 15–65)
RBC # BLD: 5.56 MILL/MM3 (ref 4.7–6.1)
SEG NEUTROPHILS: 48.4 %
SEGMENTED NEUTROPHILS ABSOLUTE COUNT: 3.3 THOU/MM3 (ref 1.8–7.7)
SODIUM BLD-SCNC: 141 MEQ/L (ref 135–145)
T4 FREE: 1.37 NG/DL (ref 0.93–1.76)
TOTAL PROTEIN: 7.5 G/DL (ref 6.1–8)
TRIGL SERPL-MCNC: 160 MG/DL (ref 0–199)
TSH SERPL DL<=0.05 MIU/L-ACNC: 1.18 UIU/ML (ref 0.4–4.2)
VITAMIN D 25-HYDROXY: 22 NG/ML (ref 30–100)
WBC # BLD: 6.9 THOU/MM3 (ref 4.8–10.8)

## 2022-02-23 NOTE — PROGRESS NOTES
[facility]  [facility]  Dept: [phone]  Dept Fax: [phone]    RAFAEL Rosenbaum is a 61 y.o.male    Pt presents for follow up of IFG, HTN, Hyperlipidemia, CKD3, Anxiety, GERD, Hyperaldosteronism    Pt feeling ok since last visit- interval history and any new issues noted below:     Patient continues seeing Dr. Asia Moscoso for CKD 3 and hypertension-last appointment 2/24/2022- all ok- to follow up annually at this time, as most recent kidney values stable. .     Glucometer readings at home are not needed. The home BP readings have been in the 150-160's / 's range. Pt currently recording BP's for Dr. Asia Moscoso and to submit in 1-2 weeks. Pt complains of known bulging disc in back- previous MRI ordered per Chiropractor. Pt continues complaining of difficulty ambulating with pain (Left leg > right leg). Pt interested in seeing Dr. Noah Wagner at this time. Otherwise, pt doing ok at this time- denies any new complaints, except occasional GERD (1x/week) since GB removed previously. Using TUMS with benefit weekly. Pt desires no work-up/ meds at this time. Wt Readings from Last 3 Encounters:   03/07/22 221 lb 6.4 oz (100.4 kg)   02/24/22 227 lb 3.2 oz (103.1 kg)   06/28/21 228 lb (103.4 kg)   Weight decreased 7# since last visit 8 months ago.      Patient Active Problem List   Diagnosis    Colon polyps    GERD (gastroesophageal reflux disease)    Hyperlipidemia with target LDL less than 100    Anxiety    Hypertension due to endocrine disorder    IFG (impaired fasting glucose)    [facility]- Dr. Olaf Hinojosa S/P laparoscopic cholecystectomy- 6/9/2018- Dr. Jalloh Setting Diastolic dysfunction without heart failure    CKD (chronic kidney disease), stage II    Essential hypertension       Current Outpatient Medications   Medication Sig Dispense Refill    hydrOXYzine (ATARAX) 10 MG tablet Take 1-2 tablets by mouth every 6 hours as needed for Anxiety 40 tablet 0    labetalol (NORMODYNE) 100 MG tablet Take 1 tablet by mouth 2 times daily 180 tablet 3    PARoxetine (PAXIL) 20 MG tablet Take 1 tablet by mouth every morning 90 tablet 3    chlorthalidone (HYGROTEN) 50 MG tablet Take 1 tablet by mouth daily 90 tablet 3    eplerenone (INSPRA) 50 MG tablet Take 1 tablet by mouth daily 90 tablet 3    amLODIPine (NORVASC) 5 MG tablet Take 1 tablet by mouth daily - if your top BP number is less than 110 do not take 90 tablet 3    potassium chloride (KLOR-CON M) 20 MEQ extended release tablet Take 3 tablets by mouth daily 270 tablet 3    acetaminophen (TYLENOL) 325 MG tablet Take 650 mg by mouth every 6 hours as needed for Pain       No current facility-administered medications for this visit. Review of Systems   Constitutional: Negative for chills, diaphoresis, fatigue, fever and unexpected weight change. Eyes: Negative for visual disturbance. Respiratory: Negative for chest tightness and shortness of breath. Cardiovascular: Positive for palpitations (rare- 1x/ daily. ). Negative for chest pain and leg swelling. Gastrointestinal: Negative for abdominal pain, anal bleeding, blood in stool, constipation, diarrhea, nausea and vomiting. Genitourinary: Negative for dysuria and hematuria. Musculoskeletal: Negative for neck pain. Neurological: Positive for headaches (occasional - on days BP's are noted to be high. ). Negative for dizziness and light-headedness. OBJECTIVE     BP (!) 150/96   Pulse 60   Temp 98.3 °F (36.8 °C) (Oral)   Resp 16   Ht 5' 7\" (1.702 m)   Wt 221 lb 6.4 oz (100.4 kg)   BMI 34.68 kg/m²   Body mass index is 34.68 kg/m². BP Readings from Last 3 Encounters:   03/07/22 (!) 150/96   02/24/22 (!) 197/105   06/28/21 134/78     Physical Exam  Vitals and nursing note reviewed. Constitutional:       Appearance: He is well-developed. HENT:      Head: Normocephalic and atraumatic.       Right Ear: External ear normal.      Left Ear: External ear normal.      Nose: Nose normal.   Eyes:      Conjunctiva/sclera: Conjunctivae normal.      Pupils: Pupils are equal, round, and reactive to light. Cardiovascular:      Rate and Rhythm: Normal rate and regular rhythm. Pulmonary:      Effort: Pulmonary effort is normal.      Breath sounds: Normal breath sounds. Abdominal:      General: Bowel sounds are normal.      Palpations: Abdomen is soft. Musculoskeletal:         General: Normal range of motion. Cervical back: Normal range of motion and neck supple. Skin:     General: Skin is warm and dry. Neurological:      Mental Status: He is alert and oriented to person, place, and time. Deep Tendon Reflexes: Reflexes are normal and symmetric. Psychiatric:         Behavior: Behavior normal.         Thought Content:  Thought content normal.         Judgment: Judgment normal.       Component      Latest Ref Rng & Units 2/21/2022   WBC      4.8 - 10.8 thou/mm3 6.9   RBC      4.70 - 6.10 mill/mm3 5.56   Hemoglobin Quant      14.0 - 18.0 gm/dl 16.1   Hematocrit      42.0 - 52.0 % 49.5   MCV      80.0 - 94.0 fL 89.0   MCH      26.0 - 33.0 pg 29.0   MCHC      32.2 - 35.5 gm/dl 32.5   RDW-CV      11.5 - 14.5 % 14.4   RDW-SD      35.0 - 45.0 fL 46.2 (H)   Platelet Count      789 - 400 thou/mm3 265   MPV      9.4 - 12.4 fL 10.7   Seg Neutrophils      % 48.4   Lymphocytes      % 38.8   Monocytes      % 9.8   Eosinophils      % 2.0   Basophils      % 0.9   Immature Granulocytes      % 0.1   Segs Absolute      1.8 - 7.7 thou/mm3 3.3   Lymphocytes Absolute      1.0 - 4.8 thou/mm3 2.7   Monocytes Absolute      0.4 - 1.3 thou/mm3 0.7   Eosinophils Absolute      0.0 - 0.4 thou/mm3 0.1   Basophils Absolute      0.0 - 0.1 thou/mm3 0.1   Immature Grans (Abs)      0.00 - 0.07 thou/mm3 0.01   Nucleated Red Blood Cells      /100 wbc 0   Glucose      70 - 108 mg/dL 114 (H)   Creatinine      0.4 - 1.2 mg/dL 1.3 (H)   BUN      7 - 22 mg/dL 20 Sodium      135 - 145 meq/L 141   Potassium      3.5 - 5.2 meq/L 4.1   Chloride      98 - 111 meq/L 98   CO2      23 - 33 meq/L 30   CALCIUM, SERUM, 830544      8.5 - 10.5 mg/dL 9.9   AST      5 - 40 U/L 22   Alk Phos      38 - 126 U/L 76   Total Protein      6.1 - 8.0 g/dL 7.5   Albumin      3.5 - 5.1 g/dL 4.8   Bilirubin      0.3 - 1.2 mg/dL 1.5 (H)   ALT      11 - 66 U/L 29   Cholesterol, Total      100 - 199 mg/dL 200 (H)   Triglycerides      0 - 199 mg/dL 160   HDL Cholesterol      mg/dL 42   LDL Calculated      mg/dL 126   Microalbumin, Random Urine      mg/dL 5.73   Creatinine, Urine      mg/dL 340.6   Microalb/Creat Ratio      0 - 30 mg/g 17   Hemoglobin A1C      4.4 - 6.4 % 6.1   AVERAGE GLUCOSE      70 - 126 mg/dL 123   TSH      0.400 - 4.200 uIU/mL 1.180   T4 Free      0.93 - 1.76 ng/dL 1.37   Pth Intact      15.0 - 65.0 pg/mL 82.4 (H)   Vit D, 25-Hydroxy      30 - 100 ng/ml 22 (L)   Anion Gap      8.0 - 16.0 meq/L 13.0   Est, Glom Filt Rate      ml/min/1.73m2 55 (A)       Lab Results   Component Value Date    LABA1C 6.1 02/21/2022       Lab Results   Component Value Date    CHOL 200 (H) 02/21/2022    TRIG 160 02/21/2022    HDL 42 02/21/2022    LDLCALC 126 02/21/2022    LDLDIRECT 122.13 06/23/2021       The 10-year ASCVD risk score (Dulce Arnold et al., 2013) is: 19.7%    Values used to calculate the score:      Age: 61 years      Sex: Male      Is Non- : No      Diabetic: No      Tobacco smoker: No      Systolic Blood Pressure: 149 mmHg      Is BP treated: Yes      HDL Cholesterol: 42 mg/dL      Total Cholesterol: 200 mg/dL    Lab Results   Component Value Date     02/21/2022    K 4.1 02/21/2022    CL 98 02/21/2022    CO2 30 02/21/2022    BUN 20 02/21/2022    CREATININE 1.3 (H) 02/21/2022    GLUCOSE 114 (H) 02/21/2022    CALCIUM 9.9 02/21/2022    PROT 7.5 02/21/2022    LABALBU 4.8 02/21/2022    BILITOT 1.5 (H) 02/21/2022    ALKPHOS 76 02/21/2022    AST 22 02/21/2022    ALT 29 02/21/2022    LABGLOM 55 (A) 02/21/2022       Lab Results   Component Value Date    LABMICR 5.73 02/21/2022       Lab Results   Component Value Date    TSH 1.180 02/21/2022    T4FREE 1.37 02/21/2022       Lab Results   Component Value Date    WBC 6.9 02/21/2022    HGB 16.1 02/21/2022    HCT 49.5 02/21/2022    MCV 89.0 02/21/2022     02/21/2022       Component      Latest Ref Rng & Units 2/24/2022 8/4/2020 6/22/2020 6/7/2019          11:24 AM  9:14 AM 12:54 PM 10:13 AM   Prostatic Specific Ag      0.00 - 1.00 ng/ml 1.10 (H) 1.13 (H) 2.28 (H) 0.75     Component      Latest Ref Rng & Units 3/9/2018 2/28/2017 1/18/2017 5/18/2015           9:26 AM  7:32 AM  8:05 AM 10:55 AM   Prostatic Specific Ag      0.00 - 1.00 ng/ml 1.01 (H) 0.94 2.53 (H) 0.90     Component      Latest Ref Rng & Units 11/12/2014 5/9/2013           9:12 AM  6:20 AM   Prostatic Specific Ag      0.00 - 1.00 ng/ml 1.87 0.92         Immunization History   Administered Date(s) Administered    Influenza 10/10/2013    Influenza Vaccine, unspecified formulation 10/10/2013    Tdap (Boostrix, Adacel) 05/01/2018    Zoster Live (Zostavax) 11/11/2013    Zoster Recombinant (Shingrix) 10/10/2018, 06/23/2021       Health Maintenance   Topic Date Due    Flu vaccine (1) 03/07/2023 (Originally 9/1/2021)    COVID-19 Vaccine (1) 03/07/2023 (Originally 3/15/1960)    Depression Screen  06/28/2022    A1C test (Diabetic or Prediabetic)  02/21/2023    Potassium monitoring  02/21/2023    Creatinine monitoring  02/21/2023    Colorectal Cancer Screen  04/28/2023    Lipid screen  02/21/2027    DTaP/Tdap/Td vaccine (2 - Td or Tdap) 05/01/2028    Shingles Vaccine  Completed    Hepatitis C screen  Completed    Hepatitis A vaccine  Aged Out    Hepatitis B vaccine  Aged Out    Hib vaccine  Aged Out    Meningococcal (ACWY) vaccine  Aged Out    Pneumococcal 0-64 years Vaccine  Aged Out    HIV screen  Discontinued     AAA ultrasound (Male, 65-75, smoked ever) indicated at this time? No tobacco history  CT Lung Screen (50-80, 20 pk-yrs, smoking or quit <15 years) indicated at this time? No tobacco history  Sleep Medicine referral indicated at this time (Obesity, Snoring, Daytime Somnolence, Apneic Episodes)? Patient denies any current symptoms, except snoring,    Future Appointments   Date Time Provider Omar Buenoi   9/7/2022  1:30 PM ANNE MARIE Kaminski - CNP [facility] [address]   2/23/2023  1:00 PM Eliud Mares MD [facility]. [address]     ASSESSMENT       Diagnosis Orders   1. IFG (impaired fasting glucose)  Hemoglobin A1C    Comprehensive Metabolic Panel   2. Hypertension due to endocrine disorder  Comprehensive Metabolic Panel    labetalol (NORMODYNE) 100 MG tablet   3. Hyperlipidemia with target LDL less than 100  Comprehensive Metabolic Panel    Lipid Panel   4. CKD (chronic kidney disease), stage II     5. Anxiety  hydrOXYzine (ATARAX) 10 MG tablet   6. Primary hyperaldosteronism ([address]     7. Gastroesophageal reflux disease without esophagitis     8. Screening PSA (prostate specific antigen)     9. Spinal stenosis of lumbar region, unspecified whether neurogenic claudication present  HAYDEN Camejo MD, Orthopedic Surgery, [address] [place] [address]   10. Foraminal stenosis of lumbar region  HAYDEN Camejo MD, Orthopedic Surgery, [address] [place] [address]   11. Vitamin D deficiency  Vitamin D 25 Hydroxy     PLAN      Home BP's- Call if > 140/90 on a regular basis- please submit BP's to Dr. Anu Isbell office as he requested. (updated 3/7/2022)  Follow up as scheduled with Dr. Natasha Calixto)  After discussion with pt, will refer to Dr. Bonita Whittaker at this time. After discussion with patient, patient would like to hold on all statins at this time, and continue to work on diet, exercise, and weight loss. Take Vit D3- 2000 units daily as recommended per Dr. Colman Sicard.    Check HG A1c, FLP, CMP, and VIT D 25 OH in 6 months  Continue current medicines  Refills  Follow up in 6 months            Preventive Health Topics:  Encouraged [address]- pt declines, as he states he had COVID in 11/2020 (+ test at that time) and possibly again in 12/2021.   (updated 3/7/2022)  Encouraged annual FLU VACCINE- pt declines (updated 3/7/2022)  Encouraged PNEUMOVAX 23 after age 74  COLONOSCOPY done 4/28/2018 per Dr. Charmayne Scarce- + Polyps- To do again in 4/2023 (updated 3/7/2022)   [address] done annually per [facility]- completed late 4/2021- all ok- to follow up in 6/2022.  (updated 3/7/2022)           Electronically signed by Antwan Kitchen MD on 3/7/2022 at 3:26 PM

## 2022-02-24 ENCOUNTER — OFFICE VISIT (OUTPATIENT)
Dept: NEPHROLOGY | Age: 67
End: 2022-02-24
Payer: COMMERCIAL

## 2022-02-24 VITALS
DIASTOLIC BLOOD PRESSURE: 105 MMHG | WEIGHT: 227.2 LBS | BODY MASS INDEX: 35.58 KG/M2 | OXYGEN SATURATION: 99 % | HEART RATE: 67 BPM | SYSTOLIC BLOOD PRESSURE: 197 MMHG | TEMPERATURE: 97.4 F

## 2022-02-24 DIAGNOSIS — E55.9 VITAMIN D DEFICIENCY: ICD-10-CM

## 2022-02-24 DIAGNOSIS — N25.81 HYPERPARATHYROIDISM, SECONDARY RENAL (HCC): ICD-10-CM

## 2022-02-24 DIAGNOSIS — N18.31 STAGE 3A CHRONIC KIDNEY DISEASE (HCC): Primary | ICD-10-CM

## 2022-02-24 DIAGNOSIS — I10 ESSENTIAL HYPERTENSION: ICD-10-CM

## 2022-02-24 LAB — PROSTATE SPECIFIC ANTIGEN: 1.1 NG/ML (ref 0–1)

## 2022-02-24 PROCEDURE — 99213 OFFICE O/P EST LOW 20 MIN: CPT | Performed by: INTERNAL MEDICINE

## 2022-02-24 NOTE — PATIENT INSTRUCTIONS
Check your blood pressure at home twice a day morning and evenings for 5 days and call the office with a report.     Please take vitamin D3 over-the-counter 2000 international units 1 capsule a day for low vitamin D

## 2022-02-24 NOTE — PROGRESS NOTES
Renal Progress Note    Assessment and Plan:      Diagnosis Orders   1. Stage 3a chronic kidney disease ([facility]     2. Essential hypertension     3. Vitamin D deficiency     4. Hyperparathyroidism, secondary renal ([facility]               PLAN:  1. Reviewed labs with the patient   2. Serum creatinine is stable at 1.3 mg/dl  3. Vitamin D level is low at 22  4. PTH is slightly high at 82.4  5. Medications reviewed   6. Vitamin D3 over-the-counter 2000 international unit 1 capsule a day. 7. Check blood pressure at home morning and evening for 5 days and call the office with report   8. Printed instruction provided to him  9. Return visit in 12 months with labs           Patient Active Problem List   Diagnosis    Colon polyps    GERD (gastroesophageal reflux disease)    Hyperlipidemia with target LDL less than 100    Anxiety    Hypertension due to endocrine disorder    IFG (impaired fasting glucose)    Primary hyperaldosteronism ([facility]- Dr. Dorothy Young S/P laparoscopic cholecystectomy- 6/9/2018- Dr. Jose Enrique Stanley Diastolic dysfunction without heart failure    CKD (chronic kidney disease), stage II    Essential hypertension           Subjective:   Chief complaint:  Chief Complaint   Patient presents with    Chronic Kidney Disease     Stage IIIa    Hypertension      HPI:This is a follow up visit for Mr. Gurdeep Don who is here today for return appointment. I see him for chronic kidney disease. He was last seen about 3 months ago. Doing well since then with no complaint. Denies any chest pain or shortness of breath. No nausea vomiting. No fever chills. No headaches. No difficulties with relation. ROS:  Pertinent positives stated above in HPI. All other systems were reviewed and were negative.   Medications:     Current Outpatient Medications   Medication Sig Dispense Refill    PARoxetine (PAXIL) 20 MG tablet Take 1 tablet by mouth every morning 90 tablet 3    chlorthalidone (HYGROTEN) 50 MG tablet Take 1 tablet by mouth daily 90 tablet 3    eplerenone (INSPRA) 50 MG tablet Take 1 tablet by mouth daily 90 tablet 3    amLODIPine (NORVASC) 5 MG tablet Take 1 tablet by mouth daily - if your top BP number is less than 110 do not take 90 tablet 3    rosuvastatin (CRESTOR) 10 MG tablet Take 1 tablet by mouth nightly 90 tablet 3    hydrOXYzine (ATARAX) 10 MG tablet Take 1-2 tablets by mouth every 6 hours as needed for Anxiety 40 tablet 0    potassium chloride (KLOR-CON M) 20 MEQ extended release tablet Take 3 tablets by mouth daily 270 tablet 3    labetalol (NORMODYNE) 200 MG tablet Take 1 tablet by mouth 2 times daily (Patient taking differently: Take 200 mg by mouth daily ) 180 tablet 3    acetaminophen (TYLENOL) 325 MG tablet Take 650 mg by mouth every 6 hours as needed for Pain       No current facility-administered medications for this visit.        Lab Results:    CBC:   Lab Results   Component Value Date    WBC 6.9 02/21/2022    HGB 16.1 02/21/2022    HCT 49.5 02/21/2022    MCV 89.0 02/21/2022     02/21/2022     BMP:    Lab Results   Component Value Date     02/21/2022     06/23/2021     02/23/2021    K 4.1 02/21/2022    K 3.4 (L) 06/23/2021    K 3.7 02/23/2021    CL 98 02/21/2022     06/23/2021     02/23/2021    CO2 30 02/21/2022    CO2 32 06/23/2021    CO2 33 02/23/2021    BUN 20 02/21/2022    BUN 13 06/23/2021    BUN 17 02/23/2021    CREATININE 1.3 (H) 02/21/2022    CREATININE 1.2 06/23/2021    CREATININE 1.4 (H) 02/23/2021    GLUCOSE 114 (H) 02/21/2022    GLUCOSE 116 (H) 06/23/2021    GLUCOSE 101 02/23/2021      Hepatic:   Lab Results   Component Value Date    AST 22 02/21/2022    AST 21 06/23/2021    AST 29 06/22/2020    ALT 29 02/21/2022    ALT 26 06/23/2021    ALT 29 06/22/2020    BILITOT 1.5 (H) 02/21/2022    BILITOT 1.4 (H) 06/23/2021    BILITOT 1.1 06/22/2020    ALKPHOS 76 02/21/2022    ALKPHOS 66 06/23/2021    ALKPHOS 70 06/22/2020     BNP: No results found for: BNP  Lipids:   Lab Results   Component Value Date    CHOL 200 (H) 02/21/2022    HDL 42 02/21/2022     INR:   Lab Results   Component Value Date    INR 0.94 09/06/2018    INR 1.24 (H) 07/23/2018    INR 1.00 07/16/2018     URINE:   Lab Results   Component Value Date    NAUR 139 11/02/2011    NAUR 170 11/02/2011    PROTUR 24.0 02/20/2012     Lab Results   Component Value Date    NITRU NEGATIVE 07/16/2019    COLORU YELLOW 07/16/2019    PHUR 7.5 07/16/2019    LABCAST 0-4 HYALINE 07/23/2018    LABCAST NONE SEEN 07/23/2018    WBCUA 2-4 07/16/2019    RBCUA 0-2 07/16/2019    YEAST NONE SEEN 07/16/2019    BACTERIA NONE 07/16/2019    CLARITYU clear 06/03/2016    SPECGRAV 1.028 07/23/2018    LEUKOCYTESUR TRACE 07/16/2019    UROBILINOGEN 1.0 07/16/2019    BILIRUBINUR NEGATIVE 07/16/2019    BILIRUBINUR neg 06/03/2016    BILIRUBINUR NEGATIVE 10/20/2011    BLOODU NEGATIVE 07/16/2019    GLUCOSEU NEGATIVE 07/16/2019    KETUA NEGATIVE 07/16/2019      Microalbumen/Creatinine ratio:  No components found for: RUCREAT    Objective:   Vitals: BP (!) 197/105 (Site: Left Upper Arm, Position: Sitting, Cuff Size: Large Adult)   Pulse 67   Temp 97.4 °F (36.3 °C)   Wt 227 lb 3.2 oz (103.1 kg)   SpO2 99%   BMI 35.58 kg/m²      Constitutional:  Alert, awake, no apparent distress  Skin:normal with no rash or any significant lesions  HEENT:Pupils are reactive . Throat is clear. Oral mucosa is moist.  Neck:supple with no thyromegaly, JVD, lymphadenopathy or bruit  Cardiovascular: Regular sinus rhythm without murmur, rubs or gallops   Respiratory:  Clear to auscultation with no wheezes or rales  Abdomen: Good bowel sound, soft, non tender and no bruit  Ext: No LE edema  Musculoskeletal:Intact  Neuro:Alert, awake and oriented with no obvious focal deficit. Speech is normal.    Electronically signed by Dio Polanco MD on 2/24/2022 at 1:24 PM   **This report has been created using voice recognition software.  It maycontain minor  errors which are inherent in voice recognition technology. **

## 2022-03-07 ENCOUNTER — OFFICE VISIT (OUTPATIENT)
Dept: FAMILY MEDICINE CLINIC | Age: 67
End: 2022-03-07
Payer: COMMERCIAL

## 2022-03-07 VITALS
HEART RATE: 60 BPM | TEMPERATURE: 98.3 F | BODY MASS INDEX: 34.75 KG/M2 | RESPIRATION RATE: 16 BRPM | HEIGHT: 67 IN | DIASTOLIC BLOOD PRESSURE: 96 MMHG | WEIGHT: 221.4 LBS | SYSTOLIC BLOOD PRESSURE: 150 MMHG

## 2022-03-07 DIAGNOSIS — R73.01 IFG (IMPAIRED FASTING GLUCOSE): Primary | ICD-10-CM

## 2022-03-07 DIAGNOSIS — F41.9 ANXIETY: ICD-10-CM

## 2022-03-07 DIAGNOSIS — M48.061 FORAMINAL STENOSIS OF LUMBAR REGION: ICD-10-CM

## 2022-03-07 DIAGNOSIS — E78.5 HYPERLIPIDEMIA WITH TARGET LDL LESS THAN 100: ICD-10-CM

## 2022-03-07 DIAGNOSIS — Z12.5 SCREENING PSA (PROSTATE SPECIFIC ANTIGEN): ICD-10-CM

## 2022-03-07 DIAGNOSIS — M48.061 SPINAL STENOSIS OF LUMBAR REGION, UNSPECIFIED WHETHER NEUROGENIC CLAUDICATION PRESENT: ICD-10-CM

## 2022-03-07 DIAGNOSIS — E26.09 PRIMARY HYPERALDOSTERONISM (HCC): ICD-10-CM

## 2022-03-07 DIAGNOSIS — N18.2 CKD (CHRONIC KIDNEY DISEASE), STAGE II: ICD-10-CM

## 2022-03-07 DIAGNOSIS — E55.9 VITAMIN D DEFICIENCY: ICD-10-CM

## 2022-03-07 DIAGNOSIS — K21.9 GASTROESOPHAGEAL REFLUX DISEASE WITHOUT ESOPHAGITIS: ICD-10-CM

## 2022-03-07 DIAGNOSIS — I15.2 HYPERTENSION DUE TO ENDOCRINE DISORDER: ICD-10-CM

## 2022-03-07 PROCEDURE — 99214 OFFICE O/P EST MOD 30 MIN: CPT | Performed by: FAMILY MEDICINE

## 2022-03-07 RX ORDER — LABETALOL 100 MG/1
100 TABLET, FILM COATED ORAL 2 TIMES DAILY
Qty: 180 TABLET | Refills: 3 | Status: SHIPPED | OUTPATIENT
Start: 2022-03-07

## 2022-03-07 RX ORDER — HYDROXYZINE HYDROCHLORIDE 10 MG/1
10-20 TABLET, FILM COATED ORAL EVERY 6 HOURS PRN
Qty: 40 TABLET | Refills: 0 | Status: SHIPPED | OUTPATIENT
Start: 2022-03-07

## 2022-03-07 SDOH — ECONOMIC STABILITY: FOOD INSECURITY: WITHIN THE PAST 12 MONTHS, YOU WORRIED THAT YOUR FOOD WOULD RUN OUT BEFORE YOU GOT MONEY TO BUY MORE.: NEVER TRUE

## 2022-03-07 SDOH — ECONOMIC STABILITY: FOOD INSECURITY: WITHIN THE PAST 12 MONTHS, THE FOOD YOU BOUGHT JUST DIDN'T LAST AND YOU DIDN'T HAVE MONEY TO GET MORE.: NEVER TRUE

## 2022-03-07 ASSESSMENT — ENCOUNTER SYMPTOMS
CONSTIPATION: 0
BLOOD IN STOOL: 0
CHEST TIGHTNESS: 0
SHORTNESS OF BREATH: 0
DIARRHEA: 0
VOMITING: 0
ANAL BLEEDING: 0
NAUSEA: 0
ABDOMINAL PAIN: 0

## 2022-03-07 ASSESSMENT — SOCIAL DETERMINANTS OF HEALTH (SDOH): HOW HARD IS IT FOR YOU TO PAY FOR THE VERY BASICS LIKE FOOD, HOUSING, MEDICAL CARE, AND HEATING?: NOT HARD AT ALL

## 2022-03-07 NOTE — PATIENT INSTRUCTIONS
Home BP's- Call if > 140/90 on a regular basis- please submit BP's to Dr. Dwight Alves office as he requested. (updated 3/7/2022)  Follow up as scheduled with Dr. Jeremias Johnson)  After discussion with pt, will refer to Dr. Jacky Stratton at this time. After discussion with patient, patient would like to hold on all statins at this time, and continue to work on diet, exercise, and weight loss. Take Vit D3- 2000 units daily as recommended per Dr. Stephanie Roth.    Check HG A1c, FLP, CMP, and VIT D 25 OH in 6 months  Continue current medicines  Refills  Follow up in 6 months            Preventive Health Topics:  Encouraged [address]- pt declines, as he states he had COVID in 11/2020 (+ test at that time) and possibly again in 12/2021.   (updated 3/7/2022)  Encouraged annual FLU VACCINE- pt declines (updated 3/7/2022)  Encouraged PNEUMOVAX 23 after age 74  COLONOSCOPY done 4/28/2018 per Dr. Niranjan Day- + Polyps- To do again in 4/2023 (updated 3/7/2022)   [address] done annually per [facility]- completed late 4/2021- all ok- to follow up in 6/2022.  (updated 3/7/2022)

## 2022-09-06 ENCOUNTER — NURSE ONLY (OUTPATIENT)
Dept: LAB | Age: 67
End: 2022-09-06

## 2022-09-06 DIAGNOSIS — R73.01 IFG (IMPAIRED FASTING GLUCOSE): ICD-10-CM

## 2022-09-06 DIAGNOSIS — E55.9 VITAMIN D DEFICIENCY: ICD-10-CM

## 2022-09-06 LAB
AVERAGE GLUCOSE: 117 MG/DL (ref 70–126)
HBA1C MFR BLD: 5.9 % (ref 4.4–6.4)
VITAMIN D 25-HYDROXY: 25 NG/ML (ref 30–100)

## 2022-09-07 ENCOUNTER — NURSE ONLY (OUTPATIENT)
Dept: LAB | Age: 67
End: 2022-09-07

## 2022-09-07 ENCOUNTER — OFFICE VISIT (OUTPATIENT)
Dept: FAMILY MEDICINE CLINIC | Age: 67
End: 2022-09-07
Payer: COMMERCIAL

## 2022-09-07 VITALS
RESPIRATION RATE: 16 BRPM | DIASTOLIC BLOOD PRESSURE: 84 MMHG | SYSTOLIC BLOOD PRESSURE: 138 MMHG | BODY MASS INDEX: 35.4 KG/M2 | HEART RATE: 68 BPM | WEIGHT: 226 LBS

## 2022-09-07 DIAGNOSIS — R73.01 IFG (IMPAIRED FASTING GLUCOSE): ICD-10-CM

## 2022-09-07 DIAGNOSIS — I51.89 DIASTOLIC DYSFUNCTION WITHOUT HEART FAILURE: ICD-10-CM

## 2022-09-07 DIAGNOSIS — N18.2 CKD (CHRONIC KIDNEY DISEASE), STAGE II: ICD-10-CM

## 2022-09-07 DIAGNOSIS — E78.5 HYPERLIPIDEMIA WITH TARGET LDL LESS THAN 100: ICD-10-CM

## 2022-09-07 DIAGNOSIS — Z12.5 SCREENING PSA (PROSTATE SPECIFIC ANTIGEN): ICD-10-CM

## 2022-09-07 DIAGNOSIS — F41.9 ANXIETY: ICD-10-CM

## 2022-09-07 DIAGNOSIS — E26.09 PRIMARY HYPERALDOSTERONISM (HCC): ICD-10-CM

## 2022-09-07 DIAGNOSIS — I15.2 HYPERTENSION DUE TO ENDOCRINE DISORDER: Primary | ICD-10-CM

## 2022-09-07 LAB
CHOLESTEROL, TOTAL: 195 MG/DL (ref 100–199)
HDLC SERPL-MCNC: 42 MG/DL
LDL CHOLESTEROL CALCULATED: 118 MG/DL
TRIGL SERPL-MCNC: 174 MG/DL (ref 0–199)

## 2022-09-07 PROCEDURE — 99214 OFFICE O/P EST MOD 30 MIN: CPT | Performed by: NURSE PRACTITIONER

## 2022-09-07 PROCEDURE — 1123F ACP DISCUSS/DSCN MKR DOCD: CPT | Performed by: NURSE PRACTITIONER

## 2022-09-07 ASSESSMENT — PATIENT HEALTH QUESTIONNAIRE - PHQ9
2. FEELING DOWN, DEPRESSED OR HOPELESS: 0
SUM OF ALL RESPONSES TO PHQ QUESTIONS 1-9: 0
SUM OF ALL RESPONSES TO PHQ9 QUESTIONS 1 & 2: 0
1. LITTLE INTEREST OR PLEASURE IN DOING THINGS: 0
SUM OF ALL RESPONSES TO PHQ QUESTIONS 1-9: 0

## 2022-09-07 NOTE — PROGRESS NOTES
[facility]  [facility]  Dept: [phone]  Dept Fax: [phone]    SUBJECTIVE     Chief Complaint   Patient presents with    6 Month Follow-Up     Discuss labs. Pt has started weaning himself off of all medications. Kali White is a 74 y. o.male    Pt presents for follow up of IFG, HTN, Hyperlipidemia, CKD3, Anxiety, GERD, Hyperaldosteronism    Pt has weaned himself off of his medications over the last 3 months. States he was having vertigo all of the time as well as upset stomachs. BP was elevated on Monday so he took his pills. He did take his medications today as well. Plans to move his appt up with Dr Iraida Rider to discuss the medications with him. Discussed removing part of his adrenals due to his hyperaldosteronism as this would take him off medication for it. Has made dietary changes recently to improve his health. The home BP readings : Monday 190/110; after meds: 185/100. Felt like a \"limp rag\" after taking his BP meds.      Wt Readings from Last 3 Encounters:   09/07/22 226 lb (102.5 kg)   03/07/22 221 lb 6.4 oz (100.4 kg)   02/24/22 227 lb 3.2 oz (103.1 kg)       Patient Active Problem List   Diagnosis    Colon polyps    GERD (gastroesophageal reflux disease)    Hyperlipidemia with target LDL less than 100    Anxiety    Hypertension due to endocrine disorder    IFG (impaired fasting glucose)    Primary hyperaldosteronism ([facility]- Dr. Iraida Rider    S/P laparoscopic cholecystectomy- 6/9/2018- Dr. Khoi Finley    Diastolic dysfunction without heart failure    CKD (chronic kidney disease), stage II    Essential hypertension       Current Outpatient Medications   Medication Sig Dispense Refill    hydrOXYzine (ATARAX) 10 MG tablet Take 1-2 tablets by mouth every 6 hours as needed for Anxiety 40 tablet 0    labetalol (NORMODYNE) 100 MG tablet Take 1 tablet by mouth 2 times daily 180 tablet 3    PARoxetine (PAXIL) 20 MG tablet Take 1 tablet by mouth every morning 90 tablet 3 chlorthalidone (HYGROTEN) 50 MG tablet Take 1 tablet by mouth daily 90 tablet 3    eplerenone (INSPRA) 50 MG tablet Take 1 tablet by mouth daily 90 tablet 3    amLODIPine (NORVASC) 5 MG tablet Take 1 tablet by mouth daily - if your top BP number is less than 110 do not take 90 tablet 3    potassium chloride (KLOR-CON M) 20 MEQ extended release tablet Take 3 tablets by mouth daily 270 tablet 3    acetaminophen (TYLENOL) 325 MG tablet Take 650 mg by mouth every 6 hours as needed for Pain       No current facility-administered medications for this visit. Review of Systems   Constitutional:  Negative for chills, fatigue, fever and unexpected weight change. HENT: Negative. Eyes: Negative. Respiratory:  Negative for chest tightness and shortness of breath. Cardiovascular:  Negative for chest pain, palpitations and leg swelling. Gastrointestinal:  Negative for abdominal pain and blood in stool. Genitourinary:  Negative for dysuria. Musculoskeletal:  Negative for joint swelling. Skin:  Negative for rash. Neurological:  Negative for dizziness. Psychiatric/Behavioral: Negative. All other systems reviewed and are negative. OBJECTIVE     /84   Pulse 68   Resp 16   Wt 226 lb (102.5 kg)   BMI 35.40 kg/m²   Body mass index is 35.4 kg/m². BP Readings from Last 3 Encounters:   09/07/22 138/84   03/07/22 (!) 150/96   02/24/22 (!) 197/105     Physical Exam  Vitals and nursing note reviewed. Constitutional:       Appearance: He is well-developed. HENT:      Head: Normocephalic and atraumatic. Right Ear: External ear normal.      Left Ear: External ear normal.      Nose: Nose normal.   Eyes:      Conjunctiva/sclera: Conjunctivae normal.      Pupils: Pupils are equal, round, and reactive to light. Cardiovascular:      Rate and Rhythm: Normal rate and regular rhythm. Pulmonary:      Effort: Pulmonary effort is normal.      Breath sounds: Normal breath sounds.    Abdominal: General: Bowel sounds are normal.      Palpations: Abdomen is soft. Musculoskeletal:         General: Normal range of motion. Cervical back: Normal range of motion and neck supple. Skin:     General: Skin is warm and dry. Neurological:      Mental Status: He is alert and oriented to person, place, and time. Deep Tendon Reflexes: Reflexes are normal and symmetric. Psychiatric:         Behavior: Behavior normal.         Thought Content: Thought content normal.         Judgment: Judgment normal.       Immunization History   Administered Date(s) Administered    Influenza 10/10/2013    Influenza Vaccine, unspecified formulation 10/10/2013    Tdap (Boostrix, Adacel) 05/01/2018    Zoster Live (Zostavax) 11/11/2013    Zoster Recombinant (Shingrix) 10/10/2018, 06/23/2021       Health Maintenance   Topic Date Due    Pneumococcal 65+ years Vaccine (1 - PCV) Never done    Flu vaccine (1) 09/01/2022    COVID-19 Vaccine (1) 03/07/2023 (Originally 1955)    Colorectal Cancer Screen  04/28/2023    A1C test (Diabetic or Prediabetic)  09/06/2023    Depression Screen  09/07/2023    Lipids  09/07/2027    DTaP/Tdap/Td vaccine (2 - Td or Tdap) 05/01/2028    Shingles vaccine  Completed    Hepatitis C screen  Completed    Hepatitis A vaccine  Aged Out    Hepatitis B vaccine  Aged Out    Hib vaccine  Aged Out    Meningococcal (ACWY) vaccine  Aged Out    HIV screen  Discontinued     AAA ultrasound (Male, 65-75, smoked ever) indicated at this time? No tobacco history  CT Lung Screen (50-80, 20 pk-yrs, smoking or quit <15 years) indicated at this time? No tobacco history  Sleep Medicine referral indicated at this time (Obesity, Snoring, Daytime Somnolence, Apneic Episodes)? Patient denies any current symptoms, except snoring,    Future Appointments   Date Time Provider Omar Teresa   2/23/2023  1:00 PM MD IWLMER Patricia     ASSESSMENT       Diagnosis Orders   1.  Hypertension due to endocrine disorder  T4, Free    CBC with Auto Differential    Hepatic Function Panel      2. Hyperlipidemia with target LDL less than 100  CBC with Auto Differential    Hepatic Function Panel      3. Anxiety        4. IFG (impaired fasting glucose)  Hemoglobin A1C    T4, Free    TSH    Microalbumin / Creatinine Urine Ratio    Hepatic Function Panel      5. CKD (chronic kidney disease), stage II  CBC with Auto Differential    Hepatic Function Panel      6. Diastolic dysfunction without heart failure        7. Primary hyperaldosteronism ([facility]- Dr. Mala Oneil  CBC with Auto Differential    Hepatic Function Panel      8. Screening PSA (prostate specific antigen)  PSA Screening          PLAN        After discussion with patient, patient would like to hold on all statins at this time, and continue to work on diet, exercise, and weight loss.     Labs as above in 6 months  Monitor BP closely  Continue current medicines - encouraged him to restart these  Pt to schedule appt with Dr Venkatesh Hathaway up in 6 months            Preventive Health Topics:    Encouraged annual FLU VACCINE- pt declines (updated 9/8/2022)  Encouraged PNEUMOVAX 23 after age 74  COLONOSCOPY done 4/28/2018 per Dr. Lennox Ranks- + Polyps- To do again in 4/2023 (updated 9/8/2022)   [address] done annually per [facility]- completed late 4/2021- all ok- to follow up in 6/2022.  (updated 9/8/2022)           Electronically signed by ANNE MARIE Marquez - CNP on 9/8/2022 at 12:45 PM

## 2022-09-08 ASSESSMENT — ENCOUNTER SYMPTOMS
CHEST TIGHTNESS: 0
ABDOMINAL PAIN: 0
SHORTNESS OF BREATH: 0
EYES NEGATIVE: 1
BLOOD IN STOOL: 0

## 2023-02-21 ENCOUNTER — NURSE ONLY (OUTPATIENT)
Dept: LAB | Age: 68
End: 2023-02-21

## 2023-02-21 DIAGNOSIS — E55.9 VITAMIN D DEFICIENCY: ICD-10-CM

## 2023-02-21 DIAGNOSIS — E26.09 PRIMARY HYPERALDOSTERONISM (HCC): ICD-10-CM

## 2023-02-21 DIAGNOSIS — Z12.5 SCREENING PSA (PROSTATE SPECIFIC ANTIGEN): ICD-10-CM

## 2023-02-21 DIAGNOSIS — N18.2 CKD (CHRONIC KIDNEY DISEASE), STAGE II: ICD-10-CM

## 2023-02-21 DIAGNOSIS — N18.31 STAGE 3A CHRONIC KIDNEY DISEASE (HCC): ICD-10-CM

## 2023-02-21 DIAGNOSIS — R73.01 IFG (IMPAIRED FASTING GLUCOSE): ICD-10-CM

## 2023-02-21 DIAGNOSIS — I15.2 HYPERTENSION DUE TO ENDOCRINE DISORDER: ICD-10-CM

## 2023-02-21 DIAGNOSIS — N25.81 HYPERPARATHYROIDISM, SECONDARY RENAL (HCC): ICD-10-CM

## 2023-02-21 DIAGNOSIS — E78.5 HYPERLIPIDEMIA WITH TARGET LDL LESS THAN 100: ICD-10-CM

## 2023-02-21 LAB
25(OH)D3 SERPL-MCNC: 22 NG/ML (ref 30–100)
ALBUMIN SERPL BCG-MCNC: 4.7 G/DL (ref 3.5–5.1)
ALP SERPL-CCNC: 81 U/L (ref 38–126)
ALT SERPL W/O P-5'-P-CCNC: 32 U/L (ref 11–66)
ANION GAP SERPL CALC-SCNC: 13 MEQ/L (ref 8–16)
AST SERPL-CCNC: 25 U/L (ref 5–40)
BASOPHILS ABSOLUTE: 0.1 THOU/MM3 (ref 0–0.1)
BASOPHILS NFR BLD AUTO: 0.9 %
BILIRUB CONJ SERPL-MCNC: < 0.2 MG/DL (ref 0–0.3)
BILIRUB SERPL-MCNC: 1.4 MG/DL (ref 0.3–1.2)
BUN SERPL-MCNC: 14 MG/DL (ref 7–22)
CALCIUM SERPL-MCNC: 9.7 MG/DL (ref 8.5–10.5)
CHLORIDE SERPL-SCNC: 98 MEQ/L (ref 98–111)
CO2 SERPL-SCNC: 30 MEQ/L (ref 23–33)
CREAT SERPL-MCNC: 1.4 MG/DL (ref 0.4–1.2)
CREAT UR-MCNC: 252.5 MG/DL
DEPRECATED MEAN GLUCOSE BLD GHB EST-ACNC: 129 MG/DL (ref 70–126)
DEPRECATED RDW RBC AUTO: 44.1 FL (ref 35–45)
EOSINOPHIL NFR BLD AUTO: 1.7 %
EOSINOPHILS ABSOLUTE: 0.1 THOU/MM3 (ref 0–0.4)
ERYTHROCYTE [DISTWIDTH] IN BLOOD BY AUTOMATED COUNT: 13.9 % (ref 11.5–14.5)
GFR SERPL CREATININE-BSD FRML MDRD: 55 ML/MIN/1.73M2
GLUCOSE SERPL-MCNC: 131 MG/DL (ref 70–108)
HBA1C MFR BLD HPLC: 6.3 % (ref 4.4–6.4)
HCT VFR BLD AUTO: 48.7 % (ref 42–52)
HGB BLD-MCNC: 15.9 GM/DL (ref 14–18)
IMM GRANULOCYTES # BLD AUTO: 0.02 THOU/MM3 (ref 0–0.07)
IMM GRANULOCYTES NFR BLD AUTO: 0.3 %
LYMPHOCYTES ABSOLUTE: 2.1 THOU/MM3 (ref 1–4.8)
LYMPHOCYTES NFR BLD AUTO: 27 %
MCH RBC QN AUTO: 28.3 PG (ref 26–33)
MCHC RBC AUTO-ENTMCNC: 32.6 GM/DL (ref 32.2–35.5)
MCV RBC AUTO: 86.7 FL (ref 80–94)
MICROALBUMIN UR-MCNC: 10.47 MG/DL
MICROALBUMIN/CREAT RATIO PNL UR: 41 MG/G (ref 0–30)
MONOCYTES ABSOLUTE: 0.8 THOU/MM3 (ref 0.4–1.3)
MONOCYTES NFR BLD AUTO: 9.7 %
NEUTROPHILS NFR BLD AUTO: 60.4 %
NRBC BLD AUTO-RTO: 0 /100 WBC
PLATELET # BLD AUTO: 275 THOU/MM3 (ref 130–400)
PMV BLD AUTO: 10.8 FL (ref 9.4–12.4)
POTASSIUM SERPL-SCNC: 3.5 MEQ/L (ref 3.5–5.2)
PROT SERPL-MCNC: 7.6 G/DL (ref 6.1–8)
PSA SERPL-MCNC: 1.19 NG/ML (ref 0–1)
PTH-INTACT SERPL-MCNC: 71.5 PG/ML (ref 15–65)
RBC # BLD AUTO: 5.62 MILL/MM3 (ref 4.7–6.1)
SEGMENTED NEUTROPHILS ABSOLUTE COUNT: 4.7 THOU/MM3 (ref 1.8–7.7)
SODIUM SERPL-SCNC: 141 MEQ/L (ref 135–145)
T4 FREE SERPL-MCNC: 1.39 NG/DL (ref 0.93–1.76)
TSH SERPL DL<=0.005 MIU/L-ACNC: 0.83 UIU/ML (ref 0.4–4.2)
WBC # BLD AUTO: 7.8 THOU/MM3 (ref 4.8–10.8)

## 2023-02-22 PROBLEM — M48.061 SPINAL STENOSIS OF LUMBAR REGION: Status: ACTIVE | Noted: 2023-02-22

## 2023-02-22 PROBLEM — M51.369 OTHER INTERVERTEBRAL DISC DEGENERATION, LUMBAR REGION: Status: ACTIVE | Noted: 2023-02-22

## 2023-02-22 PROBLEM — M47.817 LUMBOSACRAL SPONDYLOSIS WITHOUT MYELOPATHY: Status: ACTIVE | Noted: 2023-02-22

## 2023-02-22 PROBLEM — M47.816 ARTHRITIS OF FACET JOINT OF LUMBAR SPINE: Status: ACTIVE | Noted: 2023-02-22

## 2023-02-22 PROBLEM — M51.36 OTHER INTERVERTEBRAL DISC DEGENERATION, LUMBAR REGION: Status: ACTIVE | Noted: 2023-02-22

## 2023-02-23 ENCOUNTER — OFFICE VISIT (OUTPATIENT)
Dept: NEPHROLOGY | Age: 68
End: 2023-02-23
Payer: COMMERCIAL

## 2023-02-23 VITALS
OXYGEN SATURATION: 97 % | HEART RATE: 80 BPM | DIASTOLIC BLOOD PRESSURE: 109 MMHG | BODY MASS INDEX: 31.15 KG/M2 | WEIGHT: 230 LBS | SYSTOLIC BLOOD PRESSURE: 181 MMHG | HEIGHT: 72 IN

## 2023-02-23 DIAGNOSIS — N18.31 STAGE 3A CHRONIC KIDNEY DISEASE (HCC): Primary | ICD-10-CM

## 2023-02-23 DIAGNOSIS — N25.81 HYPERPARATHYROIDISM, SECONDARY RENAL (HCC): ICD-10-CM

## 2023-02-23 DIAGNOSIS — E55.9 VITAMIN D DEFICIENCY: ICD-10-CM

## 2023-02-23 DIAGNOSIS — I10 PRIMARY HYPERTENSION: ICD-10-CM

## 2023-02-23 PROCEDURE — 3077F SYST BP >= 140 MM HG: CPT | Performed by: INTERNAL MEDICINE

## 2023-02-23 PROCEDURE — 3080F DIAST BP >= 90 MM HG: CPT | Performed by: INTERNAL MEDICINE

## 2023-02-23 PROCEDURE — 99214 OFFICE O/P EST MOD 30 MIN: CPT | Performed by: INTERNAL MEDICINE

## 2023-02-23 PROCEDURE — 1123F ACP DISCUSS/DSCN MKR DOCD: CPT | Performed by: INTERNAL MEDICINE

## 2023-02-23 NOTE — PATIENT INSTRUCTIONS
Please take vitamin D3 over-the-counter 5000 international unit 1 capsule a day. Please monitor your blood pressure at least twice a day morning and evenings write the numbers down and bring it to the next clinic.   I

## 2023-02-23 NOTE — PROGRESS NOTES
Renal Progress Note    Assessment and Plan:      Diagnosis Orders   1. Stage 3a chronic kidney disease ([address]        2. Primary hypertension        3. Vitamin D deficiency        4. Hyperparathyroidism, secondary renal ([address]                  PLAN:  I discussed my thoughts with the patient. He understood well. I addressed his questions. Serum creatinine is slightly increased to 1.4 mg/dL from 1.3 mg/dL 12 months ago but is still within his baseline. PTH is slightly high at 71 and needs no specific treatment as of now. Vitamin D is low at 22  Medications reviewed  Vitamin D3 over-the-counter 5000 international unit 1 capsule a day  Please resume eplerenone and labetalol per previous doses  Printed instruction provided to the patient  Return visit in one month not 12 months with home blood pressure record          Patient Active Problem List   Diagnosis    Colon polyps    GERD (gastroesophageal reflux disease)    Hyperlipidemia with target LDL less than 100    Anxiety    Hypertension due to endocrine disorder    IFG (impaired fasting glucose)    Primary hyperaldosteronism ([address]- Dr. Mari Fenton    S/P laparoscopic cholecystectomy- 6/9/2018- Dr. Karishma Franz    Diastolic dysfunction without heart failure    CKD (chronic kidney disease), stage II    Essential hypertension    Arthritis of facet joint of lumbar spine    Lumbosacral spondylosis without myelopathy    Other intervertebral disc degeneration, lumbar region    Spinal stenosis of lumbar region           Subjective:   Chief complaint:  Chief Complaint   Patient presents with    Chronic Kidney Disease     iiia      HPI:This is a follow up visit for Mr. Suzi Sanchez who is here today for return appointment. I see him for chronic kidney disease. He was last seen about 12 months ago. Doing well with no complaint. No difficulties with urination. Appetite is good. No nausea. No vomiting. He had stopped taking all his blood pressure medications thinking they were giving him gas and blood pressure running high at home with systolic about 223 mmHg. Denies chest pain or shortness of breath    ROS:  Pertinent positives stated above in HPI. All other systems were reviewed and were negative. Medications:     Current Outpatient Medications   Medication Sig Dispense Refill    acetaminophen (TYLENOL) 325 MG tablet Take 650 mg by mouth every 6 hours as needed for Pain      hydrOXYzine (ATARAX) 10 MG tablet Take 1-2 tablets by mouth every 6 hours as needed for Anxiety (Patient not taking: Reported on 2/23/2023) 40 tablet 0    labetalol (NORMODYNE) 100 MG tablet Take 1 tablet by mouth 2 times daily (Patient not taking: Reported on 2/23/2023) 180 tablet 3    PARoxetine (PAXIL) 20 MG tablet Take 1 tablet by mouth every morning (Patient not taking: Reported on 2/23/2023) 90 tablet 3    chlorthalidone (HYGROTEN) 50 MG tablet Take 1 tablet by mouth daily (Patient not taking: Reported on 2/23/2023) 90 tablet 3    eplerenone (INSPRA) 50 MG tablet Take 1 tablet by mouth daily (Patient not taking: Reported on 2/23/2023) 90 tablet 3    amLODIPine (NORVASC) 5 MG tablet Take 1 tablet by mouth daily - if your top BP number is less than 110 do not take (Patient not taking: Reported on 2/23/2023) 90 tablet 3    potassium chloride (KLOR-CON M) 20 MEQ extended release tablet Take 3 tablets by mouth daily (Patient not taking: Reported on 2/23/2023) 270 tablet 3     No current facility-administered medications for this visit.        Lab Results:    CBC:   Lab Results   Component Value Date    WBC 7.8 02/21/2023    HGB 15.9 02/21/2023    HCT 48.7 02/21/2023    MCV 86.7 02/21/2023     02/21/2023     BMP:    Lab Results   Component Value Date     02/21/2023     02/21/2022     06/23/2021    K 3.5 02/21/2023    K 4.1 02/21/2022    K 3.4 (L) 06/23/2021    CL 98 02/21/2023    CL 98 02/21/2022     06/23/2021    CO2 30 02/21/2023    CO2 30 02/21/2022    CO2 32 06/23/2021    BUN 14 02/21/2023 BUN 20 02/21/2022    BUN 13 06/23/2021    CREATININE 1.4 (H) 02/21/2023    CREATININE 1.3 (H) 02/21/2022    CREATININE 1.2 06/23/2021    GLUCOSE 131 (H) 02/21/2023    GLUCOSE 114 (H) 02/21/2022    GLUCOSE 116 (H) 06/23/2021      Hepatic:   Lab Results   Component Value Date    AST 25 02/21/2023    AST 22 02/21/2022    AST 21 06/23/2021    ALT 32 02/21/2023    ALT 29 02/21/2022    ALT 26 06/23/2021    BILITOT 1.4 (H) 02/21/2023    BILITOT 1.5 (H) 02/21/2022    BILITOT 1.4 (H) 06/23/2021    ALKPHOS 81 02/21/2023    ALKPHOS 76 02/21/2022    ALKPHOS 66 06/23/2021     BNP: No results found for: BNP  Lipids:   Lab Results   Component Value Date    CHOL 195 09/07/2022    HDL 42 09/07/2022     INR:   Lab Results   Component Value Date    INR 0.94 09/06/2018    INR 1.24 (H) 07/23/2018    INR 1.00 07/16/2018     URINE:   Lab Results   Component Value Date/Time    NAUR 139 11/02/2011 10:00 AM    NAUR 170 11/02/2011 10:00 AM    PROTUR 24.0 02/20/2012 10:23 AM     Lab Results   Component Value Date/Time    NITRU NEGATIVE 07/16/2019 12:12 PM    COLORU YELLOW 07/16/2019 12:12 PM    PHUR 7.5 07/16/2019 12:12 PM    LABCAST 0-4 HYALINE 07/23/2018 01:40 PM    LABCAST NONE SEEN 07/23/2018 01:40 PM    WBCUA 2-4 07/16/2019 12:12 PM    RBCUA 0-2 07/16/2019 12:12 PM    YEAST NONE SEEN 07/16/2019 12:12 PM    BACTERIA NONE 07/16/2019 12:12 PM    CLARITYU clear 06/03/2016 10:04 AM    SPECGRAV 1.028 07/23/2018 01:40 PM    LEUKOCYTESUR TRACE 07/16/2019 12:12 PM    UROBILINOGEN 1.0 07/16/2019 12:12 PM    BILIRUBINUR NEGATIVE 07/16/2019 12:12 PM    BILIRUBINUR neg 06/03/2016 10:04 AM    BILIRUBINUR NEGATIVE 10/20/2011 11:00 AM    BLOODU NEGATIVE 07/16/2019 12:12 PM    GLUCOSEU NEGATIVE 07/16/2019 12:12 PM    KETUA NEGATIVE 07/16/2019 12:12 PM      Microalbumen/Creatinine ratio:  No components found for: RUCREAT    Objective:   Vitals: BP (!) 181/109 (Site: Left Upper Arm, Position: Sitting, Cuff Size: Large Adult)   Pulse 80   Ht 5' 11.5\" (1.816 m)   Wt 230 lb (104.3 kg)   SpO2 97%   BMI 31.63 kg/m²      Constitutional:  Alert, awake, no apparent distress  Skin:normal with no rash or any significant lesions  HEENT:Pupils are reactive . Throat is clear. Oral mucosa is moist.  Neck:supple with no thyromegaly, JVD, lymphadenopathy or bruit **  Cardiovascular: Regular sinus rhythm without murmur, rubs or gallops   Respiratory:  Clear to auscultation with no wheezes or rales  Abdomen: Good bowel sound, soft, non tender and no bruit  Ext: No LE edema  Musculoskeletal:Intact  Neuro:Alert, awake and oriented with no obvious focal deficit. Speech is normal.**    Electronically signed by Sakshi Colon MD on 2/23/2023 at 1:00 PM   **This report has been created using voice recognition software. It maycontain minor  errors which are inherent in voice recognition technology. **

## 2023-05-03 ENCOUNTER — TELEPHONE (OUTPATIENT)
Dept: NEPHROLOGY | Age: 68
End: 2023-05-03

## 2024-08-06 ENCOUNTER — TELEPHONE (OUTPATIENT)
Dept: NEPHROLOGY | Age: 69
End: 2024-08-06

## 2024-08-06 DIAGNOSIS — N18.31 STAGE 3A CHRONIC KIDNEY DISEASE (HCC): Primary | ICD-10-CM

## 2024-09-10 ENCOUNTER — LAB (OUTPATIENT)
Dept: LAB | Age: 69
End: 2024-09-10

## 2024-09-10 DIAGNOSIS — N18.31 STAGE 3A CHRONIC KIDNEY DISEASE (HCC): ICD-10-CM

## 2024-09-10 LAB
ANION GAP SERPL CALC-SCNC: 9 MEQ/L (ref 8–16)
BUN SERPL-MCNC: 14 MG/DL (ref 7–22)
CALCIUM SERPL-MCNC: 9.1 MG/DL (ref 8.5–10.5)
CHLORIDE SERPL-SCNC: 105 MEQ/L (ref 98–111)
CO2 SERPL-SCNC: 29 MEQ/L (ref 23–33)
CREAT SERPL-MCNC: 1.3 MG/DL (ref 0.4–1.2)
GFR SERPL CREATININE-BSD FRML MDRD: 60 ML/MIN/1.73M2
GLUCOSE SERPL-MCNC: 115 MG/DL (ref 70–108)
POTASSIUM SERPL-SCNC: 3.9 MEQ/L (ref 3.5–5.2)
SODIUM SERPL-SCNC: 143 MEQ/L (ref 135–145)

## 2024-09-16 ENCOUNTER — OFFICE VISIT (OUTPATIENT)
Dept: NEPHROLOGY | Age: 69
End: 2024-09-16
Payer: COMMERCIAL

## 2024-09-16 VITALS
OXYGEN SATURATION: 95 % | HEIGHT: 71 IN | DIASTOLIC BLOOD PRESSURE: 110 MMHG | BODY MASS INDEX: 31.36 KG/M2 | WEIGHT: 224 LBS | HEART RATE: 59 BPM | SYSTOLIC BLOOD PRESSURE: 168 MMHG

## 2024-09-16 DIAGNOSIS — N25.81 HYPERPARATHYROIDISM, SECONDARY RENAL (HCC): ICD-10-CM

## 2024-09-16 DIAGNOSIS — E87.3 METABOLIC ALKALOSIS: ICD-10-CM

## 2024-09-16 DIAGNOSIS — N18.2 CKD (CHRONIC KIDNEY DISEASE), STAGE II: Primary | ICD-10-CM

## 2024-09-16 DIAGNOSIS — I15.2 HYPERTENSION DUE TO ENDOCRINE DISORDER: ICD-10-CM

## 2024-09-16 DIAGNOSIS — N18.31 STAGE 3A CHRONIC KIDNEY DISEASE (HCC): ICD-10-CM

## 2024-09-16 DIAGNOSIS — I10 PRIMARY HYPERTENSION: ICD-10-CM

## 2024-09-16 PROCEDURE — 1123F ACP DISCUSS/DSCN MKR DOCD: CPT | Performed by: INTERNAL MEDICINE

## 2024-09-16 PROCEDURE — 99214 OFFICE O/P EST MOD 30 MIN: CPT | Performed by: INTERNAL MEDICINE

## 2024-09-16 PROCEDURE — 3077F SYST BP >= 140 MM HG: CPT | Performed by: INTERNAL MEDICINE

## 2024-09-16 PROCEDURE — 3080F DIAST BP >= 90 MM HG: CPT | Performed by: INTERNAL MEDICINE

## 2024-09-16 RX ORDER — LABETALOL 300 MG/1
300 TABLET, FILM COATED ORAL 2 TIMES DAILY
Qty: 180 TABLET | Refills: 3 | Status: SHIPPED | OUTPATIENT
Start: 2024-09-16 | End: 2024-09-16

## 2024-09-16 RX ORDER — AMLODIPINE BESYLATE 5 MG/1
5 TABLET ORAL DAILY
Qty: 90 TABLET | Refills: 1 | Status: SHIPPED | OUTPATIENT
Start: 2024-09-16

## 2024-09-16 RX ORDER — LABETALOL 200 MG/1
200 TABLET, FILM COATED ORAL 2 TIMES DAILY
Qty: 180 TABLET | Refills: 2 | Status: SHIPPED | OUTPATIENT
Start: 2024-09-16

## 2024-10-12 ENCOUNTER — LAB (OUTPATIENT)
Dept: LAB | Age: 69
End: 2024-10-12

## 2024-10-12 DIAGNOSIS — N18.31 STAGE 3A CHRONIC KIDNEY DISEASE (HCC): ICD-10-CM

## 2024-10-12 DIAGNOSIS — E87.3 METABOLIC ALKALOSIS: ICD-10-CM

## 2024-10-12 DIAGNOSIS — I10 PRIMARY HYPERTENSION: ICD-10-CM

## 2024-10-12 LAB
ANION GAP SERPL CALC-SCNC: 11 MEQ/L (ref 8–16)
BUN SERPL-MCNC: 13 MG/DL (ref 7–22)
CALCIUM SERPL-MCNC: 8.9 MG/DL (ref 8.5–10.5)
CHLORIDE SERPL-SCNC: 102 MEQ/L (ref 98–111)
CO2 SERPL-SCNC: 28 MEQ/L (ref 23–33)
CREAT SERPL-MCNC: 1.3 MG/DL (ref 0.4–1.2)
GFR SERPL CREATININE-BSD FRML MDRD: 60 ML/MIN/1.73M2
GLUCOSE FASTING: 163 MG/DL (ref 70–108)
POTASSIUM SERPL-SCNC: 3.4 MEQ/L (ref 3.5–5.2)
SODIUM SERPL-SCNC: 141 MEQ/L (ref 135–145)

## 2024-10-14 ENCOUNTER — OFFICE VISIT (OUTPATIENT)
Dept: NEPHROLOGY | Age: 69
End: 2024-10-14
Payer: COMMERCIAL

## 2024-10-14 ENCOUNTER — TELEPHONE (OUTPATIENT)
Dept: NEPHROLOGY | Age: 69
End: 2024-10-14

## 2024-10-14 VITALS
DIASTOLIC BLOOD PRESSURE: 92 MMHG | HEIGHT: 71 IN | BODY MASS INDEX: 31.36 KG/M2 | WEIGHT: 224 LBS | SYSTOLIC BLOOD PRESSURE: 154 MMHG | HEART RATE: 61 BPM | OXYGEN SATURATION: 96 %

## 2024-10-14 DIAGNOSIS — N25.81 HYPERPARATHYROIDISM, SECONDARY RENAL (HCC): ICD-10-CM

## 2024-10-14 DIAGNOSIS — I15.2 HYPERTENSION DUE TO ENDOCRINE DISORDER: ICD-10-CM

## 2024-10-14 DIAGNOSIS — N18.2 CKD (CHRONIC KIDNEY DISEASE), STAGE II: Primary | ICD-10-CM

## 2024-10-14 DIAGNOSIS — E55.9 VITAMIN D DEFICIENCY: ICD-10-CM

## 2024-10-14 DIAGNOSIS — R80.9 MICROALBUMINURIA: ICD-10-CM

## 2024-10-14 DIAGNOSIS — E87.6 HYPOKALEMIA: ICD-10-CM

## 2024-10-14 DIAGNOSIS — I10 PRIMARY HYPERTENSION: ICD-10-CM

## 2024-10-14 PROCEDURE — 99214 OFFICE O/P EST MOD 30 MIN: CPT | Performed by: INTERNAL MEDICINE

## 2024-10-14 PROCEDURE — 3080F DIAST BP >= 90 MM HG: CPT | Performed by: INTERNAL MEDICINE

## 2024-10-14 PROCEDURE — 3077F SYST BP >= 140 MM HG: CPT | Performed by: INTERNAL MEDICINE

## 2024-10-14 PROCEDURE — 1123F ACP DISCUSS/DSCN MKR DOCD: CPT | Performed by: INTERNAL MEDICINE

## 2024-10-14 RX ORDER — AMLODIPINE BESYLATE 5 MG/1
10 TABLET ORAL DAILY
Qty: 180 TABLET | Refills: 3 | Status: SHIPPED | OUTPATIENT
Start: 2024-10-14

## 2024-10-14 NOTE — TELEPHONE ENCOUNTER
----- Message from Dr. Eliud Andrews MD sent at 10/14/2024  7:38 AM EDT -----  Serum potassium is a little low.  Please verify that he is taking the potassium supplement as prescribed.

## 2024-10-14 NOTE — TELEPHONE ENCOUNTER
Left a message for patient to call the office to find out what dosage of potassium he is taking. Potassium is slightly low

## 2024-10-14 NOTE — PROGRESS NOTES
He says he forgot his blood pressure medication while he was on his cruise. From October 22-28th.  He also forgot it yesterday. He took it this morning.   He says that he was having a lot of vertigo and a \"black veil\" over his eyes. He was also struggling with short term memory.   He reports that for the past month he has only been taking one tablet of potassium chloride 60 meq daily. (NOT 3 TABS)

## 2024-10-14 NOTE — PROGRESS NOTES
Renal Progress Note    Assessment and Plan:      Diagnosis Orders   1. CKD (chronic kidney disease), stage II  Basic Metabolic Panel      2. Hypokalemia  Potassium    Magnesium      3. Vitamin D deficiency  Vitamin D 25 Hydroxy      4. Hyperparathyroidism, secondary renal (HCC)  PTH, Intact      5. Microalbuminuria  Microalbumin / Creatinine Urine Ratio      6. Hypertension due to endocrine disorder  amLODIPine (NORVASC) 5 MG tablet      7. Primary hypertension                  PLAN:  Chronic kidney disease stable with a serum creatinine of 1.3 mg/dL.  Hypokalemia is due to distal diuretic and also patient not taking his potassium supplement  as prescribed.  .  I therefore strongly advised him to go back to taking the potassium supplement as prescribed beginning today.  He demonstrated understanding.  Will repeat the potassium level along with magnesium in 2 days.  Will check vitamin D level next appointment.  Will also check parathyroid hormone level next appointment  Repeat microalbumin creatinine ratio next appointment   I advised him to do the best he can to take his medications as prescribed.  If he continues to have problem with taking his medications as prescribed it may become necessary for him to be referred to home health to assist with medication management at home.  Advised him to monitor his blood pressure at home at least 2-3 times a day for 5 days and call us with report.  Return visit in 3 months with labs          Patient Active Problem List   Diagnosis    Colon polyps    GERD (gastroesophageal reflux disease)    Hyperlipidemia with target LDL less than 100    Anxiety    Hypertension due to endocrine disorder    IFG (impaired fasting glucose)    Primary hyperaldosteronism (HCC)- Dr. Andrews    S/P laparoscopic cholecystectomy- 6/9/2018- Dr. Pierce    Diastolic dysfunction without heart failure    CKD (chronic kidney disease), stage II    Essential hypertension    Arthritis of facet joint of lumbar

## 2024-10-16 ENCOUNTER — TELEPHONE (OUTPATIENT)
Dept: NEPHROLOGY | Age: 69
End: 2024-10-16

## 2024-10-16 ENCOUNTER — LAB (OUTPATIENT)
Dept: LAB | Age: 69
End: 2024-10-16

## 2024-10-16 DIAGNOSIS — E87.6 HYPOKALEMIA: ICD-10-CM

## 2024-10-16 LAB
MAGNESIUM SERPL-MCNC: 2.2 MG/DL (ref 1.6–2.4)
POTASSIUM SERPL-SCNC: 3.9 MEQ/L (ref 3.5–5.2)

## 2024-10-16 NOTE — TELEPHONE ENCOUNTER
----- Message from Dr. Eliud Andrews MD sent at 10/16/2024 12:24 PM EDT -----  Serum potassium is back to normal.

## 2024-10-31 ENCOUNTER — OFFICE VISIT (OUTPATIENT)
Dept: FAMILY MEDICINE CLINIC | Age: 69
End: 2024-10-31

## 2024-10-31 VITALS
DIASTOLIC BLOOD PRESSURE: 88 MMHG | TEMPERATURE: 98.3 F | SYSTOLIC BLOOD PRESSURE: 152 MMHG | BODY MASS INDEX: 31.22 KG/M2 | HEART RATE: 68 BPM | HEIGHT: 71 IN | WEIGHT: 223 LBS | RESPIRATION RATE: 18 BRPM

## 2024-10-31 DIAGNOSIS — Z12.5 ENCOUNTER FOR SCREENING PROSTATE SPECIFIC ANTIGEN (PSA) MEASUREMENT: ICD-10-CM

## 2024-10-31 DIAGNOSIS — R73.01 IFG (IMPAIRED FASTING GLUCOSE): ICD-10-CM

## 2024-10-31 DIAGNOSIS — R31.9 HEMATURIA, UNSPECIFIED TYPE: Primary | ICD-10-CM

## 2024-10-31 DIAGNOSIS — N30.01 ACUTE CYSTITIS WITH HEMATURIA: ICD-10-CM

## 2024-10-31 LAB
BILIRUBIN, URINE: ABNORMAL
BLOOD URINE, POC: ABNORMAL
CHARACTER, URINE: CLEAR
COLOR, UA: YELLOW
GLUCOSE URINE: NEGATIVE MG/DL
KETONES, URINE: ABNORMAL
LEUKOCYTE CLUMPS, URINE: ABNORMAL
NITRITE, URINE: NEGATIVE
PH, URINE: 6 (ref 5–9)
PROTEIN, URINE: 30 MG/DL
SPECIFIC GRAVITY UA: >= 1.03 (ref 1–1.03)
UROBILINOGEN, URINE: 0.2 EU/DL (ref 0–1)

## 2024-10-31 RX ORDER — CIPROFLOXACIN 500 MG/1
500 TABLET, FILM COATED ORAL 2 TIMES DAILY
Qty: 14 TABLET | Refills: 0 | Status: SHIPPED | OUTPATIENT
Start: 2024-10-31 | End: 2024-11-07

## 2024-10-31 SDOH — ECONOMIC STABILITY: FOOD INSECURITY: WITHIN THE PAST 12 MONTHS, THE FOOD YOU BOUGHT JUST DIDN'T LAST AND YOU DIDN'T HAVE MONEY TO GET MORE.: NEVER TRUE

## 2024-10-31 SDOH — ECONOMIC STABILITY: FOOD INSECURITY: WITHIN THE PAST 12 MONTHS, YOU WORRIED THAT YOUR FOOD WOULD RUN OUT BEFORE YOU GOT MONEY TO BUY MORE.: NEVER TRUE

## 2024-10-31 SDOH — ECONOMIC STABILITY: INCOME INSECURITY: HOW HARD IS IT FOR YOU TO PAY FOR THE VERY BASICS LIKE FOOD, HOUSING, MEDICAL CARE, AND HEATING?: NOT HARD AT ALL

## 2024-10-31 ASSESSMENT — PATIENT HEALTH QUESTIONNAIRE - PHQ9
SUM OF ALL RESPONSES TO PHQ QUESTIONS 1-9: 0
SUM OF ALL RESPONSES TO PHQ QUESTIONS 1-9: 0
1. LITTLE INTEREST OR PLEASURE IN DOING THINGS: NOT AT ALL
SUM OF ALL RESPONSES TO PHQ9 QUESTIONS 1 & 2: 0
SUM OF ALL RESPONSES TO PHQ QUESTIONS 1-9: 0
2. FEELING DOWN, DEPRESSED OR HOPELESS: NOT AT ALL
SUM OF ALL RESPONSES TO PHQ QUESTIONS 1-9: 0

## 2024-11-01 ENCOUNTER — LAB (OUTPATIENT)
Dept: LAB | Age: 69
End: 2024-11-01

## 2024-11-01 DIAGNOSIS — R73.01 IFG (IMPAIRED FASTING GLUCOSE): ICD-10-CM

## 2024-11-01 DIAGNOSIS — R31.9 HEMATURIA, UNSPECIFIED TYPE: ICD-10-CM

## 2024-11-01 DIAGNOSIS — Z12.5 ENCOUNTER FOR SCREENING PROSTATE SPECIFIC ANTIGEN (PSA) MEASUREMENT: ICD-10-CM

## 2024-11-01 LAB
ALBUMIN SERPL BCG-MCNC: 4.2 G/DL (ref 3.5–5.1)
ALP SERPL-CCNC: 84 U/L (ref 38–126)
ALT SERPL W/O P-5'-P-CCNC: 28 U/L (ref 11–66)
ANION GAP SERPL CALC-SCNC: 12 MEQ/L (ref 8–16)
AST SERPL-CCNC: 24 U/L (ref 5–40)
BASOPHILS ABSOLUTE: 0.1 THOU/MM3 (ref 0–0.1)
BASOPHILS NFR BLD AUTO: 0.9 %
BILIRUB SERPL-MCNC: 1.5 MG/DL (ref 0.3–1.2)
BUN SERPL-MCNC: 15 MG/DL (ref 7–22)
CALCIUM SERPL-MCNC: 9.2 MG/DL (ref 8.5–10.5)
CHLORIDE SERPL-SCNC: 102 MEQ/L (ref 98–111)
CO2 SERPL-SCNC: 28 MEQ/L (ref 23–33)
CREAT SERPL-MCNC: 1.2 MG/DL (ref 0.4–1.2)
DEPRECATED MEAN GLUCOSE BLD GHB EST-ACNC: 120 MG/DL (ref 70–126)
DEPRECATED RDW RBC AUTO: 47.7 FL (ref 35–45)
EOSINOPHIL NFR BLD AUTO: 3 %
EOSINOPHILS ABSOLUTE: 0.2 THOU/MM3 (ref 0–0.4)
ERYTHROCYTE [DISTWIDTH] IN BLOOD BY AUTOMATED COUNT: 14.7 % (ref 11.5–14.5)
GFR SERPL CREATININE-BSD FRML MDRD: 66 ML/MIN/1.73M2
GLUCOSE SERPL-MCNC: 114 MG/DL (ref 70–108)
HBA1C MFR BLD HPLC: 6 % (ref 4.4–6.4)
HCT VFR BLD AUTO: 46.2 % (ref 42–52)
HGB BLD-MCNC: 14.9 GM/DL (ref 14–18)
IMM GRANULOCYTES # BLD AUTO: 0.01 THOU/MM3 (ref 0–0.07)
IMM GRANULOCYTES NFR BLD AUTO: 0.1 %
LYMPHOCYTES ABSOLUTE: 2.1 THOU/MM3 (ref 1–4.8)
LYMPHOCYTES NFR BLD AUTO: 30.7 %
MCH RBC QN AUTO: 28.4 PG (ref 26–33)
MCHC RBC AUTO-ENTMCNC: 32.3 GM/DL (ref 32.2–35.5)
MCV RBC AUTO: 88 FL (ref 80–94)
MONOCYTES ABSOLUTE: 0.7 THOU/MM3 (ref 0.4–1.3)
MONOCYTES NFR BLD AUTO: 10.7 %
NEUTROPHILS ABSOLUTE: 3.8 THOU/MM3 (ref 1.8–7.7)
NEUTROPHILS NFR BLD AUTO: 54.6 %
NRBC BLD AUTO-RTO: 0 /100 WBC
PLATELET # BLD AUTO: 240 THOU/MM3 (ref 130–400)
PMV BLD AUTO: 11 FL (ref 9.4–12.4)
POTASSIUM SERPL-SCNC: 3.8 MEQ/L (ref 3.5–5.2)
PROT SERPL-MCNC: 7.3 G/DL (ref 6.1–8)
PSA SERPL-MCNC: 1.31 NG/ML (ref 0–1)
RBC # BLD AUTO: 5.25 MILL/MM3 (ref 4.7–6.1)
SODIUM SERPL-SCNC: 142 MEQ/L (ref 135–145)
WBC # BLD AUTO: 6.9 THOU/MM3 (ref 4.8–10.8)

## 2024-11-02 LAB — BACTERIA UR CULT: NORMAL

## 2024-11-04 ENCOUNTER — TELEPHONE (OUTPATIENT)
Dept: FAMILY MEDICINE CLINIC | Age: 69
End: 2024-11-04

## 2024-11-04 ASSESSMENT — ENCOUNTER SYMPTOMS
BLOOD IN STOOL: 0
COLOR CHANGE: 0
NAUSEA: 0
ABDOMINAL PAIN: 0
WHEEZING: 0
VOMITING: 0
SHORTNESS OF BREATH: 0
COUGH: 0
DIARRHEA: 0
BACK PAIN: 0
FACIAL SWELLING: 0

## 2024-11-04 ASSESSMENT — LIFESTYLE VARIABLES: TOBACCO_USE: 0

## 2024-11-04 NOTE — TELEPHONE ENCOUNTER
----- Message from ANNE MARIE Singh CNP sent at 11/4/2024  7:54 AM EST -----  Please let pt know that his Urine culture was normal.  He can finish the antibiotics in case he is having prostatitis (prostate infection).  Referral to Urology was placed on Friday, that office should be calling for appointment.  Labs were stable.  PSA went up just a little from a year ago, Urology will evaluate this also.  A1C was stable at 6.0%. CBC and CMP were normal.  Follow up with our office as planned. Call office with any questions or concerns, or if symptoms are getting worse or changing. -MICHELL

## 2024-11-04 NOTE — TELEPHONE ENCOUNTER
Called and notified pt. Pt states that Urology did call him Friday but he requested to call them back. Pt advised to call them back to schedule and pt agreed.

## 2024-11-26 ENCOUNTER — TELEPHONE (OUTPATIENT)
Dept: UROLOGY | Age: 69
End: 2024-11-26

## 2024-11-26 ENCOUNTER — OFFICE VISIT (OUTPATIENT)
Dept: UROLOGY | Age: 69
End: 2024-11-26
Payer: COMMERCIAL

## 2024-11-26 VITALS
OXYGEN SATURATION: 100 % | BODY MASS INDEX: 31.22 KG/M2 | RESPIRATION RATE: 18 BRPM | HEART RATE: 90 BPM | WEIGHT: 223 LBS | HEIGHT: 71 IN

## 2024-11-26 DIAGNOSIS — R30.0 DYSURIA: Primary | ICD-10-CM

## 2024-11-26 DIAGNOSIS — R31.0 GROSS HEMATURIA: ICD-10-CM

## 2024-11-26 LAB
BILIRUBIN, URINE: NEGATIVE
BLOOD URINE, POC: NEGATIVE
CHARACTER, URINE: CLEAR
COLOR, UA: YELLOW
GLUCOSE URINE: NEGATIVE MG/DL
KETONES, URINE: NEGATIVE
LEUKOCYTE CLUMPS, URINE: NEGATIVE
NITRITE, URINE: NEGATIVE
PH, URINE: 7 (ref 5–9)
POST VOID RESIDUAL (PVR): 0 ML
PROTEIN, URINE: ABNORMAL MG/DL
SPECIFIC GRAVITY UA: 1.02 (ref 1–1.03)
UROBILINOGEN, URINE: 0.2 EU/DL (ref 0–1)

## 2024-11-26 PROCEDURE — 51798 US URINE CAPACITY MEASURE: CPT | Performed by: NURSE PRACTITIONER

## 2024-11-26 PROCEDURE — 81003 URINALYSIS AUTO W/O SCOPE: CPT | Performed by: NURSE PRACTITIONER

## 2024-11-26 PROCEDURE — 99204 OFFICE O/P NEW MOD 45 MIN: CPT | Performed by: NURSE PRACTITIONER

## 2024-11-26 PROCEDURE — 1123F ACP DISCUSS/DSCN MKR DOCD: CPT | Performed by: NURSE PRACTITIONER

## 2024-11-26 ASSESSMENT — ENCOUNTER SYMPTOMS
NAUSEA: 0
ABDOMINAL PAIN: 0
VOMITING: 0
BACK PAIN: 0

## 2024-11-26 NOTE — TELEPHONE ENCOUNTER
Patient is scheduled for a CT Urogram this Friday 11/29/24. Is he ok to have the contrast. CMP done on 11/1/24.

## 2024-11-26 NOTE — PROGRESS NOTES
biopsy (11/06/2012); Colonoscopy (05/2013); pr laparoscopy surg cholecystectomy (N/A, 6/9/2018); Cholecystectomy; Colonoscopy (04/2018); and pr office/outpt visit,procedure only (N/A, 7/18/2018).    Family History  This patient's family history includes Colon Polyps in his father and paternal uncle; Diabetes in his brother and father.    Social History  Babar  reports that he has never smoked. He has never used smokeless tobacco. He reports that he does not drink alcohol and does not use drugs.      Subjective:      Review of Systems   Constitutional:  Negative for activity change, appetite change, chills, diaphoresis, fatigue, fever and unexpected weight change.   Gastrointestinal:  Negative for abdominal pain, nausea and vomiting.   Genitourinary:  Positive for frequency and urgency. Negative for decreased urine volume, difficulty urinating, dysuria, flank pain and hematuria.        IPSS 14/35.    Musculoskeletal:  Negative for back pain.       Objective:   There were no vitals taken for this visit.    Physical Exam  Vitals reviewed.   Constitutional:       General: He is not in acute distress.     Appearance: Normal appearance. He is well-developed. He is not ill-appearing or diaphoretic.   HENT:      Head: Normocephalic and atraumatic.      Right Ear: External ear normal.      Left Ear: External ear normal.      Nose: Nose normal.      Mouth/Throat:      Mouth: Mucous membranes are moist.   Eyes:      General: No scleral icterus.        Right eye: No discharge.         Left eye: No discharge.   Neck:      Vascular: No JVD.      Trachea: No tracheal deviation.   Cardiovascular:      Rate and Rhythm: Normal rate and regular rhythm.   Pulmonary:      Effort: Pulmonary effort is normal. No respiratory distress.      Breath sounds: Normal breath sounds.   Abdominal:      General: There is no distension.      Tenderness: There is no abdominal tenderness. There is no right CVA tenderness or left CVA tenderness.

## 2024-11-27 NOTE — TELEPHONE ENCOUNTER
Patient is ok for Urogram. He needs to drink 1 bottle of water (16 oz)prior to Urogram and 1 bottle after the Urogram (16oz)

## 2024-11-29 ENCOUNTER — HOSPITAL ENCOUNTER (OUTPATIENT)
Dept: CT IMAGING | Age: 69
Discharge: HOME | End: 2024-11-29
Payer: COMMERCIAL

## 2024-11-29 DIAGNOSIS — R31.0 GROSS HEMATURIA: ICD-10-CM

## 2024-11-29 PROCEDURE — 6360000004 HC RX CONTRAST MEDICATION: Performed by: NURSE PRACTITIONER

## 2024-11-29 PROCEDURE — 74178 CT ABD&PLV WO CNTR FLWD CNTR: CPT | Performed by: NURSE PRACTITIONER

## 2024-11-29 RX ORDER — IOPAMIDOL 755 MG/ML
85 INJECTION, SOLUTION INTRAVASCULAR
Status: COMPLETED | OUTPATIENT
Start: 2024-11-29 | End: 2024-11-29

## 2024-11-29 RX ADMIN — IOPAMIDOL 85 ML: 755 INJECTION, SOLUTION INTRAVENOUS at 08:15

## 2024-12-04 ENCOUNTER — PROCEDURE VISIT (OUTPATIENT)
Dept: UROLOGY | Age: 69
End: 2024-12-04
Payer: COMMERCIAL

## 2024-12-04 VITALS
SYSTOLIC BLOOD PRESSURE: 200 MMHG | WEIGHT: 224.2 LBS | DIASTOLIC BLOOD PRESSURE: 120 MMHG | HEIGHT: 71 IN | BODY MASS INDEX: 31.39 KG/M2

## 2024-12-04 DIAGNOSIS — R30.0 DYSURIA: ICD-10-CM

## 2024-12-04 DIAGNOSIS — R31.0 GROSS HEMATURIA: Primary | ICD-10-CM

## 2024-12-04 PROCEDURE — 52000 CYSTOURETHROSCOPY: CPT | Performed by: UROLOGY

## 2024-12-04 NOTE — PROGRESS NOTES
Dr. Balta Person MD  Urologic Surgery        [place], [place]. [facility]  12/04/24    Patient:  Babar Haddad  MRN: [id number]  YOB: 1955    Surgeon: Dr. Balta Person MD  Assistant: None    Pre-op Diagnosis: Hematuria  Post-op Diagnosis: Same    Procedure:   Cystoscopy.    Anesthesia: Local  Complications: None  OR Blood Loss: Minimal  Fluids: Cystalloids  Specimens: None    Indication:  Hematuria with negative CT urogram.     Narrative of the Procedure:    After informed consent was obtained in the preoperative area, the patient was taken back to the operating room and remained on the [facility]. The patient was prepped and draped in a sterile manner. A time out occurred in which two patient identifiers were used. The flexible scope was carefully placed into the bladder.    Findings:  Urethra: Normal  Prostate: Moderate BPH  Bladder Neck: Normal  Papillary lesions: None  Trabeculations: moderate  Cellules/Diverticula: None  Bladder Stones: None  Ureteral Orifices: normal    OVERALL IMPRESSION: Moderate BPH      Follow-Up: Negative cysto today. Moderate trabeculation and moderate BPH. See back in 6 months w UA and cytology prior.     Balta Person MD  Electronically signed on 12/4/2024 at 10:57 AM

## 2025-01-03 DIAGNOSIS — I15.2 HYPERTENSION DUE TO ENDOCRINE DISORDER: ICD-10-CM

## 2025-01-03 RX ORDER — EPLERENONE 50 MG/1
50 TABLET ORAL DAILY
Qty: 90 TABLET | Refills: 3 | OUTPATIENT
Start: 2025-01-03

## 2025-01-03 NOTE — TELEPHONE ENCOUNTER
This medication refill is regarding a electronic request. Refill requested by patient.    Requested Prescriptions     Pending Prescriptions Disp Refills    eplerenone (INSPRA) 50 MG tablet 90 tablet 3     Sig: Take 1 tablet by mouth daily     Date of last visit: 10/31/2024   Date of next visit: Visit date not found  Date of last refill: 06/28/2021 #90/3  Pharmacy Name: [facility]     Last Lipid Panel:    Lab Results   Component Value Date/Time    CHOL 195 09/07/2022 08:44 AM    TRIG 174 09/07/2022 08:44 AM    HDL 42 09/07/2022 08:44 AM     Last CMP:   Lab Results   Component Value Date     11/01/2024    K 3.8 11/01/2024     11/01/2024    CO2 28 11/01/2024    BUN 15 11/01/2024    CREATININE 1.2 11/01/2024    GLUCOSE 114 (H) 11/01/2024    CALCIUM 9.2 11/01/2024    BILITOT 1.5 (H) 11/01/2024    ALKPHOS 84 11/01/2024    AST 24 11/01/2024    ALT 28 11/01/2024    LABGLOM 66 11/01/2024       Last Thyroid:    Lab Results   Component Value Date    TSH 0.832 02/21/2023    T4FREE 1.39 02/21/2023     Last Hemoglobin A1C:    Lab Results   Component Value Date/Time    LABA1C 6.0 11/01/2024 06:41 AM       Rx verified, ordered and set to EP.

## 2025-01-03 NOTE — TELEPHONE ENCOUNTER
Needs appt. I have not seen him in several years. WS saw for acute visit a few months ago but maintenance medications were not discussed. May need to have his nephrologist fill. TS

## 2025-01-07 RX ORDER — EPLERENONE 50 MG/1
50 TABLET, FILM COATED ORAL DAILY
Qty: 90 TABLET | Refills: 3 | Status: SHIPPED | OUTPATIENT
Start: 2025-01-07

## 2025-01-15 ENCOUNTER — LAB (OUTPATIENT)
Dept: LAB | Age: 70
End: 2025-01-15

## 2025-01-15 DIAGNOSIS — N25.81 HYPERPARATHYROIDISM, SECONDARY RENAL (HCC): ICD-10-CM

## 2025-01-15 DIAGNOSIS — E55.9 VITAMIN D DEFICIENCY: ICD-10-CM

## 2025-01-15 DIAGNOSIS — N18.2 CKD (CHRONIC KIDNEY DISEASE), STAGE II: ICD-10-CM

## 2025-01-15 DIAGNOSIS — R80.9 MICROALBUMINURIA: ICD-10-CM

## 2025-01-15 LAB
25(OH)D3 SERPL-MCNC: 19 NG/ML (ref 30–100)
ANION GAP SERPL CALC-SCNC: 9 MEQ/L (ref 8–16)
BUN SERPL-MCNC: 16 MG/DL (ref 7–22)
CALCIUM SERPL-MCNC: 9.4 MG/DL (ref 8.5–10.5)
CHLORIDE SERPL-SCNC: 103 MEQ/L (ref 98–111)
CO2 SERPL-SCNC: 26 MEQ/L (ref 23–33)
CREAT SERPL-MCNC: 1.1 MG/DL (ref 0.4–1.2)
CREAT UR-MCNC: 180.3 MG/DL
GFR SERPL CREATININE-BSD FRML MDRD: 73 ML/MIN/1.73M2
GLUCOSE SERPL-MCNC: 77 MG/DL (ref 70–108)
MICROALBUMIN UR-MCNC: 5.04 MG/DL
MICROALBUMIN/CREAT RATIO PNL UR: 28 MG/G (ref 0–30)
POTASSIUM SERPL-SCNC: 4.4 MEQ/L (ref 3.5–5.2)
PTH-INTACT SERPL-MCNC: 78 PG/ML (ref 15–65)
SODIUM SERPL-SCNC: 138 MEQ/L (ref 135–145)

## 2025-01-20 ENCOUNTER — OFFICE VISIT (OUTPATIENT)
Dept: NEPHROLOGY | Age: 70
End: 2025-01-20
Payer: COMMERCIAL

## 2025-01-20 VITALS
OXYGEN SATURATION: 96 % | HEIGHT: 70 IN | WEIGHT: 225 LBS | SYSTOLIC BLOOD PRESSURE: 180 MMHG | DIASTOLIC BLOOD PRESSURE: 102 MMHG | BODY MASS INDEX: 32.21 KG/M2 | HEART RATE: 66 BPM

## 2025-01-20 DIAGNOSIS — N18.2 CKD (CHRONIC KIDNEY DISEASE), STAGE II: ICD-10-CM

## 2025-01-20 DIAGNOSIS — E55.9 VITAMIN D DEFICIENCY: ICD-10-CM

## 2025-01-20 DIAGNOSIS — I10 PRIMARY HYPERTENSION: Primary | ICD-10-CM

## 2025-01-20 DIAGNOSIS — N25.81 HYPERPARATHYROIDISM, SECONDARY RENAL (HCC): ICD-10-CM

## 2025-01-20 PROCEDURE — 99214 OFFICE O/P EST MOD 30 MIN: CPT | Performed by: INTERNAL MEDICINE

## 2025-01-20 PROCEDURE — 1123F ACP DISCUSS/DSCN MKR DOCD: CPT | Performed by: INTERNAL MEDICINE

## 2025-01-20 PROCEDURE — 3077F SYST BP >= 140 MM HG: CPT | Performed by: INTERNAL MEDICINE

## 2025-01-20 PROCEDURE — 3080F DIAST BP >= 90 MM HG: CPT | Performed by: INTERNAL MEDICINE

## 2025-01-20 NOTE — PROGRESS NOTES
Renal Progress Note    Assessment and Plan:      Diagnosis Orders   1. Primary hypertension        2. CKD (chronic kidney disease), stage II        3. Vitamin D deficiency        4. Hyperparathyroidism, secondary renal (HCC)                  PLAN:  Blood pressure remains high in the office today.  According to the patient they are high at home as well.  We will increase eplerenone from 50 mg a day to twice a day.  This was discussed with the patient.  He will monitor his blood pressure closely thereafter and will call us with the reports.  Serum creatinine is stable at 1.1 mg/dL with estimated a GFR of 73 mL/min.  Vitamin D level is low at 19.  Like to take vitamin D3 over-the-counter 5001 tablet a day  Parathyroid hormone level is slightly increased from 71.5 to-78 .  This does not require any treatment yet.  Medications reviewed  No changes  Return visit in 6 months with labs.          Patient Active Problem List   Diagnosis    Colon polyps    GERD (gastroesophageal reflux disease)    Hyperlipidemia with target LDL less than 100    Anxiety    Hypertension due to endocrine disorder    IFG (impaired fasting glucose)    Primary hyperaldosteronism (HCC)- Dr. Andrews    S/P laparoscopic cholecystectomy- 6/9/2018- Dr. Pierce    Diastolic dysfunction without heart failure    CKD (chronic kidney disease), stage II    Essential hypertension    Arthritis of facet joint of lumbar spine    Lumbosacral spondylosis without myelopathy    Other intervertebral disc degeneration, lumbar region    Spinal stenosis of lumbar region           Subjective:   Chief complaint:  Chief Complaint   Patient presents with    Follow-up     FU 3 months for CKD 2      HPI:This is a follow up visit for Mr. Babar Haddad here today for return appointment.  I see him for hypertension, chronic kidney disease among other things.  He was last seen October 2024.  Doing well with no complaint.  He has good appetite.  Urinates well.  No chest pain.  No

## 2025-01-20 NOTE — PROGRESS NOTES
Babar reports that his BP is still running high. He does say he is taking his medication more consistently. He had the urogram 11/29/24.   Forgot his BP Saturday dose. He used to take Paxil for anxiety but hasn't had any episodes so hasn't had a prescription for approximately 2 years. He decreased his potassium from TID to QD himself.

## 2025-02-28 ENCOUNTER — HOSPITAL ENCOUNTER (INPATIENT)
Age: 70
LOS: 1 days | Discharge: HOME | DRG: 305 | End: 2025-03-01
Attending: EMERGENCY MEDICINE | Admitting: INTERNAL MEDICINE
Payer: COMMERCIAL

## 2025-02-28 ENCOUNTER — APPOINTMENT (OUTPATIENT)
Dept: CT IMAGING | Age: 70
DRG: 305 | End: 2025-02-28
Payer: COMMERCIAL

## 2025-02-28 ENCOUNTER — APPOINTMENT (OUTPATIENT)
Dept: GENERAL RADIOLOGY | Age: 70
DRG: 305 | End: 2025-02-28
Payer: COMMERCIAL

## 2025-02-28 DIAGNOSIS — R79.89 ELEVATED TROPONIN: ICD-10-CM

## 2025-02-28 DIAGNOSIS — I10 UNCONTROLLED HYPERTENSION: Primary | ICD-10-CM

## 2025-02-28 DIAGNOSIS — I15.2 HYPERTENSION DUE TO ENDOCRINE DISORDER: ICD-10-CM

## 2025-02-28 PROBLEM — I16.1 HYPERTENSIVE EMERGENCY: Status: ACTIVE | Noted: 2025-02-28

## 2025-02-28 LAB
ALBUMIN SERPL BCG-MCNC: 4.3 G/DL (ref 3.4–4.9)
ALP SERPL-CCNC: 96 U/L (ref 40–129)
ALT SERPL W/O P-5'-P-CCNC: 29 U/L (ref 10–50)
ANION GAP SERPL CALC-SCNC: 12 MEQ/L (ref 8–16)
AST SERPL-CCNC: 23 U/L (ref 10–50)
BASOPHILS ABSOLUTE: 0.1 THOU/MM3 (ref 0–0.1)
BASOPHILS NFR BLD AUTO: 1.1 %
BILIRUB SERPL-MCNC: 0.9 MG/DL (ref 0.3–1.2)
BUN SERPL-MCNC: 15 MG/DL (ref 8–23)
CALCIUM SERPL-MCNC: 9.3 MG/DL (ref 8.8–10.2)
CHLORIDE SERPL-SCNC: 102 MEQ/L (ref 98–111)
CO2 SERPL-SCNC: 29 MEQ/L (ref 22–29)
CREAT SERPL-MCNC: 1.3 MG/DL (ref 0.7–1.2)
DEPRECATED RDW RBC AUTO: 43.8 FL (ref 35–45)
EKG ATRIAL RATE: 60 BPM
EKG P AXIS: 26 DEGREES
EKG P-R INTERVAL: 160 MS
EKG Q-T INTERVAL: 442 MS
EKG QRS DURATION: 104 MS
EKG QTC CALCULATION (BAZETT): 442 MS
EKG R AXIS: -39 DEGREES
EKG T AXIS: 143 DEGREES
EKG VENTRICULAR RATE: 60 BPM
EOSINOPHIL NFR BLD AUTO: 2.1 %
EOSINOPHILS ABSOLUTE: 0.1 THOU/MM3 (ref 0–0.4)
ERYTHROCYTE [DISTWIDTH] IN BLOOD BY AUTOMATED COUNT: 13.7 % (ref 11.5–14.5)
FLUAV RNA RESP QL NAA+PROBE: NOT DETECTED
FLUBV RNA RESP QL NAA+PROBE: NOT DETECTED
GFR SERPL CREATININE-BSD FRML MDRD: 60 ML/MIN/1.73M2
GLUCOSE SERPL-MCNC: 125 MG/DL (ref 74–109)
HCT VFR BLD AUTO: 44.2 % (ref 42–52)
HGB BLD-MCNC: 14.7 GM/DL (ref 14–18)
IMM GRANULOCYTES # BLD AUTO: 0.02 THOU/MM3 (ref 0–0.07)
IMM GRANULOCYTES NFR BLD AUTO: 0.3 %
LIPASE SERPL-CCNC: 21 U/L (ref 13–60)
LYMPHOCYTES ABSOLUTE: 1.7 THOU/MM3 (ref 1–4.8)
LYMPHOCYTES NFR BLD AUTO: 27.7 %
MAGNESIUM SERPL-MCNC: 2.2 MG/DL (ref 1.6–2.6)
MCH RBC QN AUTO: 28.7 PG (ref 26–33)
MCHC RBC AUTO-ENTMCNC: 33.3 GM/DL (ref 32.2–35.5)
MCV RBC AUTO: 86.3 FL (ref 80–94)
MONOCYTES ABSOLUTE: 0.7 THOU/MM3 (ref 0.4–1.3)
MONOCYTES NFR BLD AUTO: 10.4 %
NEUTROPHILS ABSOLUTE: 3.7 THOU/MM3 (ref 1.8–7.7)
NEUTROPHILS NFR BLD AUTO: 58.4 %
NRBC BLD AUTO-RTO: 0 /100 WBC
NT-PROBNP SERPL IA-MCNC: 671 PG/ML (ref 0–124)
OSMOLALITY SERPL CALC.SUM OF ELEC: 287.3 MOSMOL/KG (ref 275–300)
PLATELET # BLD AUTO: 218 THOU/MM3 (ref 130–400)
PMV BLD AUTO: 10.8 FL (ref 9.4–12.4)
POTASSIUM SERPL-SCNC: 3.7 MEQ/L (ref 3.5–5.2)
PROT SERPL-MCNC: 7.1 G/DL (ref 6.4–8.3)
RBC # BLD AUTO: 5.12 MILL/MM3 (ref 4.7–6.1)
SARS-COV-2 RNA RESP QL NAA+PROBE: NOT DETECTED
SODIUM SERPL-SCNC: 143 MEQ/L (ref 135–145)
TROPONIN, HIGH SENSITIVITY: 16 NG/L (ref 0–12)
TROPONIN, HIGH SENSITIVITY: 16 NG/L (ref 0–12)
TROPONIN, HIGH SENSITIVITY: 19 NG/L (ref 0–12)
WBC # BLD AUTO: 6.3 THOU/MM3 (ref 4.8–10.8)

## 2025-02-28 PROCEDURE — 2580000003 HC RX 258

## 2025-02-28 PROCEDURE — 96372 THER/PROPH/DIAG INJ SC/IM: CPT

## 2025-02-28 PROCEDURE — 6360000002 HC RX W HCPCS

## 2025-02-28 PROCEDURE — 93005 ELECTROCARDIOGRAM TRACING: CPT | Performed by: EMERGENCY MEDICINE

## 2025-02-28 PROCEDURE — 1200000003 HC TELEMETRY R&B

## 2025-02-28 PROCEDURE — 83735 ASSAY OF MAGNESIUM: CPT

## 2025-02-28 PROCEDURE — 93010 ELECTROCARDIOGRAM REPORT: CPT | Performed by: NUCLEAR MEDICINE

## 2025-02-28 PROCEDURE — 71275 CT ANGIOGRAPHY CHEST: CPT

## 2025-02-28 PROCEDURE — 87636 SARSCOV2 & INF A&B AMP PRB: CPT

## 2025-02-28 PROCEDURE — 96374 THER/PROPH/DIAG INJ IV PUSH: CPT

## 2025-02-28 PROCEDURE — 6370000000 HC RX 637 (ALT 250 FOR IP): Performed by: EMERGENCY MEDICINE

## 2025-02-28 PROCEDURE — G0378 HOSPITAL OBSERVATION PER HR: HCPCS

## 2025-02-28 PROCEDURE — 84484 ASSAY OF TROPONIN QUANT: CPT

## 2025-02-28 PROCEDURE — 6370000000 HC RX 637 (ALT 250 FOR IP)

## 2025-02-28 PROCEDURE — 70450 CT HEAD/BRAIN W/O DYE: CPT

## 2025-02-28 PROCEDURE — 83880 ASSAY OF NATRIURETIC PEPTIDE: CPT

## 2025-02-28 PROCEDURE — 74174 CTA ABD&PLVS W/CONTRAST: CPT

## 2025-02-28 PROCEDURE — 80053 COMPREHEN METABOLIC PANEL: CPT

## 2025-02-28 PROCEDURE — 83690 ASSAY OF LIPASE: CPT

## 2025-02-28 PROCEDURE — 96365 THER/PROPH/DIAG IV INF INIT: CPT

## 2025-02-28 PROCEDURE — 71045 X-RAY EXAM CHEST 1 VIEW: CPT

## 2025-02-28 PROCEDURE — 99285 EMERGENCY DEPT VISIT HI MDM: CPT

## 2025-02-28 PROCEDURE — 36415 COLL VENOUS BLD VENIPUNCTURE: CPT

## 2025-02-28 PROCEDURE — 6360000002 HC RX W HCPCS: Performed by: EMERGENCY MEDICINE

## 2025-02-28 PROCEDURE — 85025 COMPLETE CBC W/AUTO DIFF WBC: CPT

## 2025-02-28 PROCEDURE — 6360000004 HC RX CONTRAST MEDICATION: Performed by: EMERGENCY MEDICINE

## 2025-02-28 PROCEDURE — 96366 THER/PROPH/DIAG IV INF ADDON: CPT

## 2025-02-28 RX ORDER — ASPIRIN 81 MG/1
324 TABLET, CHEWABLE ORAL ONCE
Status: COMPLETED | OUTPATIENT
Start: 2025-02-28 | End: 2025-02-28

## 2025-02-28 RX ORDER — ONDANSETRON 2 MG/ML
4 INJECTION INTRAMUSCULAR; INTRAVENOUS EVERY 6 HOURS PRN
Status: DISCONTINUED | OUTPATIENT
Start: 2025-02-28 | End: 2025-03-01 | Stop reason: HOSPADM

## 2025-02-28 RX ORDER — IOPAMIDOL 755 MG/ML
80 INJECTION, SOLUTION INTRAVASCULAR
Status: COMPLETED | OUTPATIENT
Start: 2025-02-28 | End: 2025-02-28

## 2025-02-28 RX ORDER — NITROGLYCERIN 20 MG/100ML
5-200 INJECTION INTRAVENOUS CONTINUOUS
Status: DISCONTINUED | OUTPATIENT
Start: 2025-02-28 | End: 2025-03-01 | Stop reason: HOSPADM

## 2025-02-28 RX ORDER — SODIUM CHLORIDE 0.9 % (FLUSH) 0.9 %
5-40 SYRINGE (ML) INJECTION PRN
Status: DISCONTINUED | OUTPATIENT
Start: 2025-02-28 | End: 2025-03-01 | Stop reason: HOSPADM

## 2025-02-28 RX ORDER — AMLODIPINE BESYLATE 10 MG/1
10 TABLET ORAL DAILY
Status: DISCONTINUED | OUTPATIENT
Start: 2025-02-28 | End: 2025-03-01 | Stop reason: HOSPADM

## 2025-02-28 RX ORDER — ENOXAPARIN SODIUM 100 MG/ML
30 INJECTION SUBCUTANEOUS EVERY 12 HOURS
Status: DISCONTINUED | OUTPATIENT
Start: 2025-02-28 | End: 2025-03-01 | Stop reason: HOSPADM

## 2025-02-28 RX ORDER — LABETALOL 300 MG/1
300 TABLET, FILM COATED ORAL 2 TIMES DAILY
Status: DISCONTINUED | OUTPATIENT
Start: 2025-02-28 | End: 2025-03-01 | Stop reason: HOSPADM

## 2025-02-28 RX ORDER — SODIUM CHLORIDE 9 MG/ML
INJECTION, SOLUTION INTRAVENOUS PRN
Status: DISCONTINUED | OUTPATIENT
Start: 2025-02-28 | End: 2025-03-01 | Stop reason: HOSPADM

## 2025-02-28 RX ORDER — SODIUM CHLORIDE 0.9 % (FLUSH) 0.9 %
5-40 SYRINGE (ML) INJECTION EVERY 12 HOURS SCHEDULED
Status: DISCONTINUED | OUTPATIENT
Start: 2025-02-28 | End: 2025-03-01 | Stop reason: HOSPADM

## 2025-02-28 RX ORDER — ACETAMINOPHEN 650 MG/1
650 SUPPOSITORY RECTAL EVERY 6 HOURS PRN
Status: DISCONTINUED | OUTPATIENT
Start: 2025-02-28 | End: 2025-03-01 | Stop reason: HOSPADM

## 2025-02-28 RX ORDER — POLYETHYLENE GLYCOL 3350 17 G/17G
17 POWDER, FOR SOLUTION ORAL DAILY PRN
Status: DISCONTINUED | OUTPATIENT
Start: 2025-02-28 | End: 2025-03-01 | Stop reason: HOSPADM

## 2025-02-28 RX ORDER — ONDANSETRON 4 MG/1
4 TABLET, ORALLY DISINTEGRATING ORAL EVERY 8 HOURS PRN
Status: DISCONTINUED | OUTPATIENT
Start: 2025-02-28 | End: 2025-03-01 | Stop reason: HOSPADM

## 2025-02-28 RX ORDER — SPIRONOLACTONE 25 MG/1
50 TABLET ORAL DAILY
Status: DISCONTINUED | OUTPATIENT
Start: 2025-02-28 | End: 2025-03-01 | Stop reason: HOSPADM

## 2025-02-28 RX ORDER — SODIUM CHLORIDE 9 MG/ML
INJECTION, SOLUTION INTRAVENOUS CONTINUOUS
Status: ACTIVE | OUTPATIENT
Start: 2025-02-28 | End: 2025-03-01

## 2025-02-28 RX ORDER — ACETAMINOPHEN 325 MG/1
650 TABLET ORAL ONCE
Status: COMPLETED | OUTPATIENT
Start: 2025-02-28 | End: 2025-02-28

## 2025-02-28 RX ORDER — ACETAMINOPHEN 325 MG/1
650 TABLET ORAL EVERY 6 HOURS PRN
Status: DISCONTINUED | OUTPATIENT
Start: 2025-02-28 | End: 2025-03-01 | Stop reason: HOSPADM

## 2025-02-28 RX ORDER — EPLERENONE 25 MG/1
50 TABLET ORAL DAILY
Status: DISCONTINUED | OUTPATIENT
Start: 2025-02-28 | End: 2025-03-01 | Stop reason: HOSPADM

## 2025-02-28 RX ADMIN — ASPIRIN 324 MG: 81 TABLET, CHEWABLE ORAL at 04:20

## 2025-02-28 RX ADMIN — AMLODIPINE BESYLATE 10 MG: 10 TABLET ORAL at 11:56

## 2025-02-28 RX ADMIN — SODIUM CHLORIDE: 0.9 INJECTION, SOLUTION INTRAVENOUS at 11:28

## 2025-02-28 RX ADMIN — LABETALOL HYDROCHLORIDE 300 MG: 300 TABLET, FILM COATED ORAL at 11:57

## 2025-02-28 RX ADMIN — SPIRONOLACTONE 50 MG: 25 TABLET ORAL at 11:55

## 2025-02-28 RX ADMIN — LABETALOL HYDROCHLORIDE 300 MG: 300 TABLET, FILM COATED ORAL at 22:24

## 2025-02-28 RX ADMIN — ACETAMINOPHEN 650 MG: 325 TABLET ORAL at 11:22

## 2025-02-28 RX ADMIN — ENOXAPARIN SODIUM 30 MG: 100 INJECTION SUBCUTANEOUS at 11:56

## 2025-02-28 RX ADMIN — ENOXAPARIN SODIUM 30 MG: 100 INJECTION SUBCUTANEOUS at 20:52

## 2025-02-28 RX ADMIN — NITROGLYCERIN 20 MCG/MIN: 20 INJECTION INTRAVENOUS at 04:19

## 2025-02-28 RX ADMIN — SODIUM CHLORIDE: 0.9 INJECTION, SOLUTION INTRAVENOUS at 22:26

## 2025-02-28 RX ADMIN — IOPAMIDOL 80 ML: 755 INJECTION, SOLUTION INTRAVENOUS at 04:30

## 2025-02-28 RX ADMIN — ACETAMINOPHEN 650 MG: 325 TABLET ORAL at 04:23

## 2025-02-28 ASSESSMENT — PAIN DESCRIPTION - DESCRIPTORS
DESCRIPTORS: ACHING
DESCRIPTORS: DULL

## 2025-02-28 ASSESSMENT — PAIN SCALES - GENERAL
PAINLEVEL_OUTOF10: 1
PAINLEVEL_OUTOF10: 5
PAINLEVEL_OUTOF10: 5
PAINLEVEL_OUTOF10: 4
PAINLEVEL_OUTOF10: 4
PAINLEVEL_OUTOF10: 0
PAINLEVEL_OUTOF10: 4
PAINLEVEL_OUTOF10: 4
PAINLEVEL_OUTOF10: 1

## 2025-02-28 ASSESSMENT — PAIN DESCRIPTION - ORIENTATION
ORIENTATION: POSTERIOR

## 2025-02-28 ASSESSMENT — PAIN - FUNCTIONAL ASSESSMENT
PAIN_FUNCTIONAL_ASSESSMENT: 0-10
PAIN_FUNCTIONAL_ASSESSMENT: 0-10
PAIN_FUNCTIONAL_ASSESSMENT: NONE - DENIES PAIN
PAIN_FUNCTIONAL_ASSESSMENT: 0-10

## 2025-02-28 ASSESSMENT — PAIN DESCRIPTION - LOCATION
LOCATION: HEAD;NECK
LOCATION: HEAD
LOCATION: HEAD
LOCATION: HEAD;NECK
LOCATION: HEAD
LOCATION: HEAD

## 2025-02-28 NOTE — CARE COORDINATION
Advance Care Planning     General Advance Care Planning (ACP) Conversation    Date of Conversation: 2/28/2025  Conducted with: Patient with Decision Making Capacity  Other persons present: None    Healthcare Decision Maker:   The identified healthcare power of /surrogate decision makers are as follows:   Primary Decision Maker: Luz Marina Haddad - Spouse - [phone]    Secondary Decision Maker: Donavon Haddad - Child - [phone]    Supplemental (Other) Decision Maker: Stefani Sylvester - Brother/Sister - [phone]     Content/Action Overview:  Patient does not have any advanced directives and desires to have them completed and attached to the chart during this admission.  Referral to spiritual health made to complete ACP documents.    Treatment limitations and CODE STATUS to be reviewed by the provider.    Length of Voluntary ACP Conversation in minutes:  <16 minutes (Non-Billable)    QUAN CLARK RN

## 2025-02-28 NOTE — ED PROVIDER NOTES
[facility]  EMERGENCY DEPARTMENT ENCOUNTER          Pt Name: Babar Haddad  MRN: [id number]  Birthdate 1955  Date of evaluation: 2/28/2025    CHIEF COMPLAINT       Chief Complaint   Patient presents with    Hypertension       Nurses Notes reviewed and I agree except as noted in the HPI.    HISTORY OF PRESENT ILLNESS    Babar Haddad is a 69 y.o. pleasant male who presents to the emergency department for evaluation of blood pressure.  Patient reports that he woke up a little before midnight, felt like he was having a dream and had some feelings of difficulty breathing, also felt that he was quite dry in his mouth and had a little bit of cough.  He began to suspect that his blood pressure was elevated, states that he will get a dull posterior headache when his pressure is high.  He also had some right mid abdominal discomfort described as a dull ache when he woke up.  He has been having some sinus drainage and runny nose recently.  He was up for a couple of hours before deciding to check his blood pressure and at home his systolic was above 200.  He has longstanding issues with hypertension, states that his blood pressure is often elevated.  He has had similar episodes in the past, once he was admitted for hypertension, later he was found to have cholelithiasis and biliary colic, was later admitted and underwent cholecystectomy.  He also at 1 point had pancreatitis.  He reports a history of hyperaldosteronism and occasional spikes of his blood pressure.  He denies chest pain, nausea, vomiting, diaphoresis.  Denies lower extremity pain or swelling.  Currently he states his headache and abdominal discomfort are nearly gone.  The patient has no other acute complaints at this time.        REVIEW OF SYSTEMS   Review of Systems    PAST MEDICAL AND SURGICAL HISTORY     Past Medical History:   Diagnosis Date    Gallbladder disease     GERD (gastroesophageal reflux disease)     HTN      CONSULTS:  Hospitalist for admit    PROCEDURES:  None    Shared decision making was performed with the patient and/or present family.   Goals of care were discussed with patient and/or present family.      The results of pertinent diagnostic studies and exam findings were discussed. The patient’s provisional diagnosis and plan of care were discussed with the patient and present family. The patient and/or present family expressed understanding of the diagnosis and plan.     The nurse was instructed to provide written instructions and appropriate follow-up information. The patient understands their need and responsibility to obtain additional follow-up as instructed. The patient is comfortable with the plan and discharge. The risks of medications administered and prescribed were discussed with the patient and family present.      MEDICATION CHANGES     New Prescriptions    No medications on file       CLINICAL IMPRESSION      1. Uncontrolled hypertension    2. Elevated troponin          DISPOSITION/PLAN     Final diagnoses:   Uncontrolled hypertension   Elevated troponin       Dispo: Admit     PATIENT REFERRED TO:  No follow-up provider specified.    DISCHARGE MEDICATIONS:  New Prescriptions    No medications on file       (Please note that portions of this note were completed with a voice recognition program.  Efforts were made to edit the dictations but occasionally words are mis-transcribed.)    Provider:  I personally performed the services described in the documentation, reviewed and edited the documentation which was dictated, and it accurately records my words and actions.    Maty Sheriff MD 2/28/25 6:42 AM         Maty Sheriff MD  02/28/25 0781

## 2025-02-28 NOTE — ED NOTES
Pt is laying in bed with wife at bedside, pt asks for tissues, no other requests at this time. Pt is breathing regular and unlabored on room air at this time. Pt has pain in posterior head rated 1/10 described as stiff neck aching feeling. Pt is hypertensive other vitals stable at this time, Nitro started at this time and titrated to 30mcg/min per order. Call light within reach.

## 2025-02-28 NOTE — ED TRIAGE NOTES
Pt presents to the ED via walk-in from home with c/o hypertension, pt states he woke up around 0030 due to having a dream that he couldn't breathe, pt states he woke up and felt anxious and pt thought to take BP and pt's BP was high, pt states he took all BP medication on 2/27 but on 2/26 he forgot to take his BP medications, pt has hx of hypertension. Pt has posterior headache, rated 4/10 described as stiff neck feeling, pt also has dull ache in right mid abdomen rated 4/10 that started around 0030. Pt hypertenisve 194/102 in left arm and right arm 202/108, other vitals stable, pt alert and oriented x4. EKG complete.

## 2025-02-28 NOTE — ED NOTES
Pt sitting up in bed with no requests at this time. Pt is breathing regular and unlabored on room air. Pt has no signs of distress to note at this time. Call light within reach.

## 2025-02-28 NOTE — ED NOTES
ED to inpatient nurses report      Chief Complaint:  Chief Complaint   Patient presents with    Hypertension     Present to ED from: home    MOA:     LOC: alert and orientated to name, place, date  Mobility: Independent  Oxygen Baseline: ra    Current needs required: ra     Code Status:   Prior    What abnormal results were found and what did you give/do to treat them? Elevated Troponin, Hypertension  Any procedures or intervention occur? See MAR    Mental Status:  Level of Consciousness: Alert (0)    Psych Assessment:        Vitals:  Patient Vitals for the past 24 hrs:   BP Temp Temp src Pulse Resp SpO2 Height Weight   02/28/25 0647 (!) 166/101 -- -- 59 19 99 % -- --   02/28/25 0533 (!) 163/97 -- -- 64 16 97 % -- --   02/28/25 0458 (!) 169/89 -- -- 65 17 95 % -- --   02/28/25 0427 (!) 187/107 -- -- 64 -- -- -- --   02/28/25 0417 (!) 197/112 -- -- 60 -- -- -- --   02/28/25 0340 (!) 198/109 -- -- 61 12 99 % -- --   02/28/25 0249 (!) 202/108 98.6 °F (37 °C) Oral 64 10 98 % 1.803 m (5' 11\") 101.6 kg (224 lb)        LDAs:   Peripheral IV 02/28/25 Left Antecubital (Active)   Site Assessment Clean, dry & intact 02/28/25 0645   Line Status Infusing 02/28/25 0645   Phlebitis Assessment No symptoms 02/28/25 0645   Infiltration Assessment 0 02/28/25 0645   Dressing Status Clean, dry & intact 02/28/25 0645       Ambulatory Status:  Presents to emergency department  because of falls (Syncope, seizure, or loss of consciousness): No, Age > 70: No, Altered Mental Status, Intoxication with alcohol or substance confusion (Disorientation, impaired judgment, poor safety awaremess, or inability to follow instructions): No, Impaired Mobility: Ambulates or transfers with assistive devices or assistance; Unable to ambulate or transer.: No, Nursing Judgement: No    Diagnosis:  DISPOSITION Decision To Admit 02/28/2025 06:24:59 AM   Final diagnoses:   Uncontrolled hypertension   Elevated troponin        Consults:  None     Pain Score:  Pain

## 2025-02-28 NOTE — ED NOTES
Pt is laying in bed with wife at bedside, no requests at this time. Pt is breathing regular and unlabored on room air at this time. Pt has dull headache rated 1/10, no other signs of distress to note at this time. Call light within reach.

## 2025-02-28 NOTE — ED NOTES
Pt is sitting up in bed with wife at bedside, no requests at this time. Pt denies feeling abnormal at this time or having any pain at this time. Pt is breathing regular and unlabored on room air at this time. Pt is hypertensive other vitals stable, Call light within reach.

## 2025-02-28 NOTE — H&P
History & Physical  Internal Medicine Resident         Patient: Babar Haddad 69 y.o. male      : 1955  Date of Admission: 2025  Date of Service: Pt seen/examined on 25 and Admitted to Inpatient with expected LOS greater than two midnights due to medical therapy.       ASSESSMENT AND PLAN    HTN emergency:   In ED pt /108, elevated troponin. States he forgot to take his medication on , but did take all of his medication yesterday, but BP kept rising, did have some abdominal pain and headache, which he says occurs when his BP is elevated. Headache improved in ED.   Home meds Norvasc 5 mg + eplernone 50 mg + labetalol 200 mg BID.  - Started on nitro gtt in ED, will resume home meds and wean off nitro gtt.      Elevated Troponin:   Suspect elevation in setting of significantly elevated BP.  Troponin x2: 19 + 16 + 16.   EKG: NSR.   CTA Chest: No PE, aortic aneurysm or dissection noted.  Given  mg in ED.   - Trend troponin, order EKG and troponin if CP occurs.     Incidental Finding:  RUL pulmonary nodule. Noted on CTA chest: 1.6 cm pulmonary nodule in RUL. Consider CT at 3 months, PET-CT, or tissue sampling. Will need OP f/u for further workup.     CKD stage IIIa: BL Cr of ~1.1-1.2. On arrival Cr of 1.3. Started on maintenance fluids, will follow daily BMP. Follows nephrology with Dr. Botello. If Cr rises consider urine studies.     Hyperparathyroidism: Elevated PTH 78. Follows Dr. Botello.     BPH w/ LUTS/ Hx of Hematuria: Cystoscopy done 2024. Follows Urology Dr. Person. Not on medication.       Data reviewed (unless otherwise discussed in assessment/plan)  EKG:  I have reviewed the EKG with the following interpretation: NSR  Imaging: I have reviewed CTA chest CT AP and CT head with the following interpretation: No aortic aneurysm or dissection, no PE, new right upper lobe 1.6 cm pulmonary nodule noted, no acute intracranial findings.   Labs: Reviewed, see chart and plan above.  SKIN BIOPSY  11/06/2012    Behind right ear and left ring finger         Problem Relation Age of Onset    Diabetes Father     Colon Polyps Father     Diabetes Brother     Colon Polyps Paternal Uncle     Colon Cancer Neg Hx     Breast Cancer Neg Hx     Cancer Neg Hx      Social History     Socioeconomic History    Marital status:      Spouse name: None    Number of children: None    Years of education: None    Highest education level: None   Tobacco Use    Smoking status: Never    Smokeless tobacco: Never   Vaping Use    Vaping status: Never Used   Substance and Sexual Activity    Alcohol use: No    Drug use: No    Sexual activity: Yes     Partners: Female     Social Determinants of Health     Financial Resource Strain: Low Risk  (10/31/2024)    Overall Financial Resource Strain (CARDIA)     Difficulty of Paying Living Expenses: Not hard at all   Food Insecurity: No Food Insecurity (2/28/2025)    Hunger Vital Sign     Worried About Running Out of Food in the Last Year: Never true     Ran Out of Food in the Last Year: Never true   Transportation Needs: No Transportation Needs (2/28/2025)    PRAPARE - Transportation     Lack of Transportation (Medical): No     Lack of Transportation (Non-Medical): No   Housing Stability: Low Risk  (2/28/2025)    Housing Stability Vital Sign     Unable to Pay for Housing in the Last Year: No     Number of Times Moved in the Last Year: 0     Homeless in the Last Year: No        Code status: Full Code     Electronically signed by Rosa Maria Bloom MD on 2/28/2025 at 4:28 PM.   Case was discussed with the Attending, Dr. Mann.

## 2025-03-01 VITALS
DIASTOLIC BLOOD PRESSURE: 79 MMHG | OXYGEN SATURATION: 95 % | BODY MASS INDEX: 31.36 KG/M2 | HEART RATE: 56 BPM | SYSTOLIC BLOOD PRESSURE: 145 MMHG | TEMPERATURE: 98.4 F | HEIGHT: 71 IN | WEIGHT: 224 LBS | RESPIRATION RATE: 18 BRPM

## 2025-03-01 LAB
ANION GAP SERPL CALC-SCNC: 12 MEQ/L (ref 8–16)
BUN SERPL-MCNC: 11 MG/DL (ref 8–23)
CALCIUM SERPL-MCNC: 8.9 MG/DL (ref 8.8–10.2)
CHLORIDE SERPL-SCNC: 107 MEQ/L (ref 98–111)
CO2 SERPL-SCNC: 23 MEQ/L (ref 22–29)
CREAT SERPL-MCNC: 1.2 MG/DL (ref 0.7–1.2)
DEPRECATED RDW RBC AUTO: 44.5 FL (ref 35–45)
EKG ATRIAL RATE: 64 BPM
EKG P AXIS: 46 DEGREES
EKG P-R INTERVAL: 170 MS
EKG Q-T INTERVAL: 440 MS
EKG QRS DURATION: 108 MS
EKG QTC CALCULATION (BAZETT): 453 MS
EKG R AXIS: -41 DEGREES
EKG T AXIS: 139 DEGREES
EKG VENTRICULAR RATE: 64 BPM
ERYTHROCYTE [DISTWIDTH] IN BLOOD BY AUTOMATED COUNT: 14.1 % (ref 11.5–14.5)
GFR SERPL CREATININE-BSD FRML MDRD: 66 ML/MIN/1.73M2
GLUCOSE SERPL-MCNC: 111 MG/DL (ref 74–109)
HCT VFR BLD AUTO: 43.9 % (ref 42–52)
HGB BLD-MCNC: 14.4 GM/DL (ref 14–18)
MAGNESIUM SERPL-MCNC: 2.1 MG/DL (ref 1.6–2.6)
MCH RBC QN AUTO: 28.4 PG (ref 26–33)
MCHC RBC AUTO-ENTMCNC: 32.8 GM/DL (ref 32.2–35.5)
MCV RBC AUTO: 86.6 FL (ref 80–94)
PHOSPHATE SERPL-MCNC: 2.3 MG/DL (ref 2.5–4.5)
PLATELET # BLD AUTO: 210 THOU/MM3 (ref 130–400)
PMV BLD AUTO: 11.1 FL (ref 9.4–12.4)
POTASSIUM SERPL-SCNC: 3.6 MEQ/L (ref 3.5–5.2)
RBC # BLD AUTO: 5.07 MILL/MM3 (ref 4.7–6.1)
SODIUM SERPL-SCNC: 142 MEQ/L (ref 135–145)
WBC # BLD AUTO: 6.4 THOU/MM3 (ref 4.8–10.8)

## 2025-03-01 PROCEDURE — 36415 COLL VENOUS BLD VENIPUNCTURE: CPT

## 2025-03-01 PROCEDURE — 2500000003 HC RX 250 WO HCPCS: Performed by: OPHTHALMOLOGY

## 2025-03-01 PROCEDURE — 6360000002 HC RX W HCPCS

## 2025-03-01 PROCEDURE — G0378 HOSPITAL OBSERVATION PER HR: HCPCS

## 2025-03-01 PROCEDURE — 83735 ASSAY OF MAGNESIUM: CPT

## 2025-03-01 PROCEDURE — 96367 TX/PROPH/DG ADDL SEQ IV INF: CPT

## 2025-03-01 PROCEDURE — 6370000000 HC RX 637 (ALT 250 FOR IP)

## 2025-03-01 PROCEDURE — 80048 BASIC METABOLIC PNL TOTAL CA: CPT

## 2025-03-01 PROCEDURE — 85027 COMPLETE CBC AUTOMATED: CPT

## 2025-03-01 PROCEDURE — 99238 HOSP IP/OBS DSCHRG MGMT 30/<: CPT | Performed by: OPHTHALMOLOGY

## 2025-03-01 PROCEDURE — 84100 ASSAY OF PHOSPHORUS: CPT

## 2025-03-01 PROCEDURE — 93010 ELECTROCARDIOGRAM REPORT: CPT | Performed by: NUCLEAR MEDICINE

## 2025-03-01 PROCEDURE — 93005 ELECTROCARDIOGRAM TRACING: CPT

## 2025-03-01 PROCEDURE — 96366 THER/PROPH/DIAG IV INF ADDON: CPT

## 2025-03-01 PROCEDURE — 2580000003 HC RX 258: Performed by: OPHTHALMOLOGY

## 2025-03-01 RX ORDER — LABETALOL 300 MG/1
300 TABLET, FILM COATED ORAL 2 TIMES DAILY
Qty: 60 TABLET | Refills: 3 | Status: SHIPPED | OUTPATIENT
Start: 2025-03-01

## 2025-03-01 RX ORDER — SPIRONOLACTONE 50 MG/1
25 TABLET, FILM COATED ORAL DAILY
Qty: 30 TABLET | Refills: 3 | Status: SHIPPED | OUTPATIENT
Start: 2025-03-02 | End: 2025-03-01 | Stop reason: HOSPADM

## 2025-03-01 RX ORDER — EPLERENONE 50 MG/1
50 TABLET ORAL 2 TIMES DAILY
Qty: 90 TABLET | Refills: 3 | Status: SHIPPED | OUTPATIENT
Start: 2025-03-01

## 2025-03-01 RX ADMIN — LABETALOL HYDROCHLORIDE 300 MG: 300 TABLET, FILM COATED ORAL at 10:07

## 2025-03-01 RX ADMIN — SPIRONOLACTONE 50 MG: 25 TABLET ORAL at 10:07

## 2025-03-01 RX ADMIN — SODIUM PHOSPHATE, MONOBASIC, MONOHYDRATE AND SODIUM PHOSPHATE, DIBASIC ANHYDROUS 15 MMOL: 142; 276 INJECTION, SOLUTION INTRAVENOUS at 14:35

## 2025-03-01 RX ADMIN — AMLODIPINE BESYLATE 10 MG: 10 TABLET ORAL at 10:07

## 2025-03-01 RX ADMIN — ENOXAPARIN SODIUM 30 MG: 100 INJECTION SUBCUTANEOUS at 10:06

## 2025-03-01 NOTE — DISCHARGE SUMMARY
Hospitalist Discharge Summary    Patient:  Babar Haddad  YOB: 1955    MRN: [id number]   Acct: [id number]    Primary Care Physician: Ivone Diez APRN - CNP    Admit date:  2/28/2025    Discharge date:  3/1/2025    Discharge Diagnoses:  Principal Problem:    Hypertensive emergency  Resolved Problems:    * No resolved hospital problems. *      Discharge Medications:         Medication List        CHANGE how you take these medications      amLODIPine 5 MG tablet  Commonly known as: NORVASC  Take 2 tablets by mouth daily - if your top BP number is less than 110 do not take  What changed:   when to take this  additional instructions     * eplerenone 50 MG tablet  Commonly known as: INSPRA  Take 1 tablet by mouth daily  What changed: Another medication with the same name was changed. Make sure you understand how and when to take each.     * eplerenone 50 MG tablet  Commonly known as: INSPRA  Take 1 tablet by mouth 2 times daily  What changed: when to take this     labetalol 300 MG tablet  Commonly known as: NORMODYNE  Take 1 tablet by mouth 2 times daily  What changed:   medication strength  how much to take           * This list has 2 medication(s) that are the same as other medications prescribed for you. Read the directions carefully, and ask your doctor or other care provider to review them with you.                CONTINUE taking these medications      acetaminophen 325 MG tablet  Commonly known as: TYLENOL            STOP taking these medications      potassium chloride 20 MEQ extended release tablet  Commonly known as: KLOR-CON M               Where to Get Your Medications        These medications were sent to [facility] [address] [place] [address] - P [phone] - F [phone]  [address] [place] [id number]      Phone: [phone]   eplerenone 50 MG tablet  labetalol 300 MG tablet         Diet:  ADULT DIET; Regular    Activity:  Activity as  further evaluation.  Patient states that when his blood pressure rises he gets headaches and has similar symptoms.  In the ED patient was started on a nitro drip and BP improved, headache also improved.  Patient had CT head which was negative for acute intracranial process, CTA chest did not show PE or aneurysms, but it did show a new right upper lobe nodule.  He was admitted to hospital services for further management of elevated BP     Assessment/Plan:  HTN emergency:   In ED pt /108, elevated troponin. States he forgot to take his medication on 2/26, but did take all of his medication yesterday, but BP kept rising, did have some abdominal pain and headache, which he says occurs when his BP is elevated. Headache improved in ED.   Home meds Norvasc 5 mg + eplernone 50 mg + labetalol 200 mg BID.  - Started on nitro gtt in ED, will resume home meds and wean off nitro gtt:off since last night: doing better: discharg ehome      Elevated Troponin:   Suspect elevation in setting of significantly elevated BP.  Troponin x2: 19 + 16 + 16.   EKG: NSR.   CTA Chest: No PE, aortic aneurysm or dissection noted.  Given  mg in ED.   - Trend troponin, order EKG and troponin if CP occurs. : likely sec to HTN: no need for further workup      Incidental Finding:  RUL pulmonary nodule. Noted on CTA chest: 1.6 cm pulmonary nodule in RUL. Consider CT at 3 months, PET-CT, or tissue sampling. Will need OP f/u for further workup.      CKD stage IIIa: BL Cr of ~1.1-1.2. On arrival Cr of 1.3. Started on maintenance fluids, will follow daily BMP. Follows nephrology with Dr. Botello. If Cr rises consider urine studies.      Hyperparathyroidism: Elevated PTH 78. Follows Dr. Botello.      BPH w/ LUTS/ Hx of Hematuria: Cystoscopy done 12/2024. Follows Urology Dr. Person. Not on medication.     Disposition: home    Condition: Stable    Time Spent: 25 minutes        Ana Nunes MD, MD  Discharging Hospitalist

## 2025-03-01 NOTE — PROCEDURES
PROCEDURE NOTE  Date: 3/1/2025   Name: Babar Haddad  YOB: 1955    Procedures        EKG was completed and handed to RN

## 2025-03-01 NOTE — PLAN OF CARE
Problem: Discharge Planning  Goal: Discharge to home or other facility with appropriate resources  3/1/2025 1013 by Mami Segundo RN  Outcome: Progressing  3/1/2025 0047 by Juan Coffman RN  Outcome: Progressing     Problem: Pain  Goal: Verbalizes/displays adequate comfort level or baseline comfort level  3/1/2025 1013 by Mami Segundo RN  Outcome: Progressing  3/1/2025 0047 by Juan Coffman RN  Outcome: Progressing     Problem: Safety - Adult  Goal: Free from fall injury  3/1/2025 1013 by Mami Segundo RN  Outcome: Progressing  3/1/2025 0047 by Juan Coffman RN  Outcome: Progressing     Problem: ABCDS Injury Assessment  Goal: Absence of physical injury  3/1/2025 1013 by Mami Segundo RN  Outcome: Progressing  3/1/2025 0047 by Juan Coffman RN  Outcome: Progressing

## 2025-03-01 NOTE — PROGRESS NOTES
Hospitalist Progress Note    Patient:  Babar Haddad      Unit/Bed:8A-06/006-A    YOB: 1955    MRN: [id number]       Acct: [id number]     PCP: Ivone Diez APRN - CNP    Date of Admission: 2/28/2025    Chief complaint: headaches    Admission history:(per H&P note)   Babar Haddad is a 69 y.o. male with PMHx of hyperparathyroidism, BPH, CKD stage III, HTN who presents to [facility] from home for evaluation of elevated blood pressure.  At home his SBP was in the 190s to 200s, he also had headache and some abdominal pain.  Patient states he forgot to take his medication on 2/26, however took all his medications on 2/27.  Checked his blood pressure and was concerned about it being significantly elevated and decided to come to ED for further evaluation.  Patient states that when his blood pressure rises he gets headaches and has similar symptoms.  In the ED patient was started on a nitro drip and BP improved, headache also improved.  Patient had CT head which was negative for acute intracranial process, CTA chest did not show PE or aneurysms, but it did show a new right upper lobe nodule.  He was admitted to hospital services for further management of elevated BP     Assessment/Plan:  HTN emergency:   In ED pt /108, elevated troponin. States he forgot to take his medication on 2/26, but did take all of his medication yesterday, but BP kept rising, did have some abdominal pain and headache, which he says occurs when his BP is elevated. Headache improved in ED.   Home meds Norvasc 5 mg + eplernone 50 mg + labetalol 200 mg BID.  - Started on nitro gtt in ED, will resume home meds and wean off nitro gtt:off since last night: doing better: discharg ehome      Elevated Troponin:   Suspect elevation in setting of significantly elevated BP.  Troponin x2: 19 + 16 + 16.   EKG: NSR.   CTA Chest: No PE, aortic aneurysm or dissection noted.  Given  mg in ED.   - Trend  Hospitalist

## 2025-03-01 NOTE — PROGRESS NOTES
Spiritual Health History and Assessment/Progress Note  [facility]    Advance Care Planning,  ,  ,      Name: Babar Haddad MRN: [id number]    Age: 69 y.o.     Sex: male   Language: English   Methodist: Mu-ism   Hypertensive emergency     Date: 3/1/2025            Total Time Calculated: (P) 55 min              Spiritual Assessment began in [facility]        Referral/Consult From: Nurse   Encounter Overview/Reason: Advance Care Planning  Service Provided For: Patient    Avani, Belief, Meaning:   Patient identifies as spiritual, is connected with a avani tradition or spiritual practice, and has beliefs or practices that help with coping during difficult times  Family/Friends No family/friends present      Importance and Influence:  Patient has spiritual/personal beliefs that influence decisions regarding their health  Family/Friends No family/friends present    Community:  Patient is connected with a spiritual community and feels well-supported. Support system includes: Spouse/Partner, Children, Avani Community, and Friends  Family/Friends No family/friends present    Assessment and Plan of Care:     Patient Interventions include: Assisted in Advance Care Planning conversation  Family/Friends Interventions include: No family/friends present    Patient Plan of Care: Spiritual Care available upon further referral  Family/Friends Plan of Care: No family/friends present    Electronically signed by Chaplain Mariama on 3/1/2025 at 8:32 AM

## 2025-03-01 NOTE — ACP (ADVANCE CARE PLANNING)
Advance Care Planning     Advance Care Planning Inpatient Note  [facility] Department    Today's Date: 3/1/2025  Unit: [facility]AB    Received request from HealthCare Provider and admission screening.  Upon review of chart and communication with care team, patient's decision making abilities are not in question.. Patient was/were present in the room during visit.    Goals of ACP Conversation:  Discuss advance care planning documents  Facilitate a discussion related to patient's goals of care as they align with the patient's values and beliefs.    Health Care Decision Makers:       Primary Decision Maker: Luz Marina Haddad - Spouse - [phone]    Secondary Decision Maker: Donavon Barrera Child - [phone]  Summary:  Verified Documents  Completed New Documents  Verified Healthcare Decision Maker  Updated Healthcare Decision Maker    Advance Care Planning Documents (Patient Wishes):  Healthcare Power of /Advance Directive Appointment of Health Care Agent  Legal Guardianship Document     Assessment:  The patient was encountered in his room where he completed ACP documents. The patient stated his agents and shared his values related to specific medical care. The patient did show pause related to some procedures but did not decline them.     Interventions:  Provided education on documents for clarity and greater understanding  Discussed and provided education on state decision maker hierarchy  Assisted in the completion of documents according to patient's wishes at this time  Encouraged ongoing ACP conversation with future decision makers and loved ones    Care Preferences Communicated:     Hospitalization:  If the patient's health worsens and it becomes clear that the chance of recovery is unlikely,     the patient wants hospitalization.    Ventilation:   If the patient, in their present state of health, suddenly became very ill and unable to breathe on their own,     the patient would desire the use

## 2025-03-01 NOTE — PROGRESS NOTES
Reviewed AVS with patient, including follow up appointments, medications list at discharge, activity level and follow up appointments. Patient verbalized understanding of instructions, all questions answered. IV sites removed. Medications sent to patients pharmacy. Patient left in stable condition with all personal belongings.

## 2025-03-21 DIAGNOSIS — I10 PRIMARY HYPERTENSION: Primary | ICD-10-CM

## 2025-03-27 ENCOUNTER — LAB (OUTPATIENT)
Dept: LAB | Age: 70
End: 2025-03-27

## 2025-03-27 DIAGNOSIS — I10 PRIMARY HYPERTENSION: ICD-10-CM

## 2025-03-27 LAB
ANION GAP SERPL CALC-SCNC: 10 MEQ/L (ref 8–16)
BUN SERPL-MCNC: 11 MG/DL (ref 8–23)
CALCIUM SERPL-MCNC: 9.9 MG/DL (ref 8.8–10.2)
CHLORIDE SERPL-SCNC: 104 MEQ/L (ref 98–111)
CO2 SERPL-SCNC: 29 MEQ/L (ref 22–29)
CREAT SERPL-MCNC: 1.2 MG/DL (ref 0.7–1.2)
GFR SERPL CREATININE-BSD FRML MDRD: 66 ML/MIN/1.73M2
GLUCOSE SERPL-MCNC: 96 MG/DL (ref 74–109)
POTASSIUM SERPL-SCNC: 3.9 MEQ/L (ref 3.5–5.2)
SODIUM SERPL-SCNC: 143 MEQ/L (ref 135–145)

## 2025-03-31 ENCOUNTER — OFFICE VISIT (OUTPATIENT)
Dept: NEPHROLOGY | Age: 70
End: 2025-03-31
Payer: COMMERCIAL

## 2025-03-31 VITALS
OXYGEN SATURATION: 97 % | HEIGHT: 71 IN | SYSTOLIC BLOOD PRESSURE: 152 MMHG | BODY MASS INDEX: 31.22 KG/M2 | DIASTOLIC BLOOD PRESSURE: 92 MMHG | HEART RATE: 58 BPM | WEIGHT: 223 LBS

## 2025-03-31 DIAGNOSIS — I10 PRIMARY HYPERTENSION: Primary | ICD-10-CM

## 2025-03-31 DIAGNOSIS — E83.39 HYPOPHOSPHATEMIA: ICD-10-CM

## 2025-03-31 DIAGNOSIS — E55.9 VITAMIN D DEFICIENCY: ICD-10-CM

## 2025-03-31 DIAGNOSIS — N25.81 HYPERPARATHYROIDISM, SECONDARY RENAL: ICD-10-CM

## 2025-03-31 PROCEDURE — 3080F DIAST BP >= 90 MM HG: CPT | Performed by: INTERNAL MEDICINE

## 2025-03-31 PROCEDURE — 1123F ACP DISCUSS/DSCN MKR DOCD: CPT | Performed by: INTERNAL MEDICINE

## 2025-03-31 PROCEDURE — 3077F SYST BP >= 140 MM HG: CPT | Performed by: INTERNAL MEDICINE

## 2025-03-31 PROCEDURE — 99214 OFFICE O/P EST MOD 30 MIN: CPT | Performed by: INTERNAL MEDICINE

## 2025-03-31 NOTE — PROGRESS NOTES
Babar reports trip to ED for uncontrolled HTN. (2/2825)  ED Doctor changed his Inspra BID from QD.

## 2025-03-31 NOTE — PATIENT INSTRUCTIONS
This is an addendum to the dictation.  He was prescribed phosphorus supplement twice a day as this has been an ongoing problem for him.

## 2025-03-31 NOTE — PROGRESS NOTES
Babar reports trip to ED for uncontrolled HTN. (2/2825)  Babar says ED Doctor changed his Inspra BID from QD, however upon checking record, Dr. Andrews increased that at his last appointment in January.

## 2025-03-31 NOTE — PROGRESS NOTES
Renal Progress Note    Assessment and Plan:      Diagnosis Orders   1. Primary hypertension        2. Hypophosphatemia        3. Hyperparathyroidism, secondary renal        4. Vitamin D deficiency                  PLAN:  Primary hypertension is controlled and stable.  Etiology of hypophosphatemia is uncertain but suspect possibly poor oral intake versus vitamin D deficiency.  This was discussed with the patient.  Parathyroid hormone level is slightly high at 78.0.  This is not high enough to require any specific treatment now.  Vitamin D level is low at 19. Advised to take vitamin D3 ,5000 IU bid. Printed  instructions given to him.  Hospital records reviewed  Monitor blood pressure at home at least twice a day.  Next 5 days and call with the report.  Return visit in 4 weeks             Patient Active Problem List   Diagnosis    Colon polyps    GERD (gastroesophageal reflux disease)    Hyperlipidemia with target LDL less than 100    Anxiety    Hypertension due to endocrine disorder    IFG (impaired fasting glucose)    Primary hyperaldosteronism (HCC)- Dr. Andrews    S/P laparoscopic cholecystectomy- 6/9/2018- Dr. Pierce    Diastolic dysfunction without heart failure    CKD (chronic kidney disease), stage II    Essential hypertension    Arthritis of facet joint of lumbar spine    Lumbosacral spondylosis without myelopathy    Other intervertebral disc degeneration, lumbar region    Spinal stenosis of lumbar region    Hypertensive emergency           Subjective:   Chief complaint:  Chief Complaint   Patient presents with    Follow-up     FU for primary hypertension      HPI:This is a follow up visit for Mr. Babar Haddad here today for return appointment.  I see him for hypertension.  He was last seen January 2025.  At that time his eplerenone was increased from 50 mg a day to twice  due to uncontrolled blood pressure.  However recently about  4 weeks ago he was admitted at [facility] for uncontrolled blood pressure

## 2025-04-29 DIAGNOSIS — N25.81 HYPERPARATHYROIDISM, SECONDARY RENAL: ICD-10-CM

## 2025-04-29 DIAGNOSIS — E87.3 METABOLIC ALKALOSIS: ICD-10-CM

## 2025-04-29 DIAGNOSIS — N18.31 STAGE 3A CHRONIC KIDNEY DISEASE (HCC): ICD-10-CM

## 2025-04-29 DIAGNOSIS — E55.9 VITAMIN D DEFICIENCY: Primary | ICD-10-CM

## 2025-06-15 ENCOUNTER — APPOINTMENT (OUTPATIENT)
Dept: GENERAL RADIOLOGY | Age: 70
End: 2025-06-15
Payer: COMMERCIAL

## 2025-06-15 ENCOUNTER — HOSPITAL ENCOUNTER (EMERGENCY)
Age: 70
Discharge: HOME | End: 2025-06-15
Payer: COMMERCIAL

## 2025-06-15 DIAGNOSIS — J06.9 VIRAL URI: Primary | ICD-10-CM

## 2025-06-15 LAB — SARS-COV-2 RDRP RESP QL NAA+PROBE: NOT  DETECTED

## 2025-06-15 PROCEDURE — 99213 OFFICE O/P EST LOW 20 MIN: CPT | Performed by: NURSE PRACTITIONER

## 2025-06-15 PROCEDURE — 71046 X-RAY EXAM CHEST 2 VIEWS: CPT

## 2025-06-15 PROCEDURE — 87635 SARS-COV-2 COVID-19 AMP PRB: CPT

## 2025-06-15 PROCEDURE — 99213 OFFICE O/P EST LOW 20 MIN: CPT

## 2025-06-15 RX ORDER — DEXTROMETHORPHAN HYDROBROMIDE AND PROMETHAZINE HYDROCHLORIDE 15; 6.25 MG/5ML; MG/5ML
5 SYRUP ORAL 4 TIMES DAILY PRN
Qty: 120 ML | Refills: 0 | Status: SHIPPED | OUTPATIENT
Start: 2025-06-15 | End: 2025-06-22

## 2025-06-15 RX ORDER — PREDNISONE 20 MG/1
20 TABLET ORAL 2 TIMES DAILY
Qty: 10 TABLET | Refills: 0 | Status: SHIPPED | OUTPATIENT
Start: 2025-06-15 | End: 2025-06-20

## 2025-06-15 RX ORDER — ALBUTEROL SULFATE 90 UG/1
2 INHALANT RESPIRATORY (INHALATION) 4 TIMES DAILY PRN
Qty: 6.7 G | Refills: 0 | Status: SHIPPED | OUTPATIENT
Start: 2025-06-15

## 2025-06-15 RX ORDER — BENZONATATE 200 MG/1
200 CAPSULE ORAL 3 TIMES DAILY PRN
Qty: 15 CAPSULE | Refills: 0 | Status: SHIPPED | OUTPATIENT
Start: 2025-06-15 | End: 2025-06-20

## 2025-06-15 ASSESSMENT — PAIN SCALES - GENERAL: PAINLEVEL_OUTOF10: 5

## 2025-06-15 ASSESSMENT — PAIN DESCRIPTION - DESCRIPTORS: DESCRIPTORS: ACHING

## 2025-06-15 ASSESSMENT — PAIN DESCRIPTION - FREQUENCY: FREQUENCY: CONTINUOUS

## 2025-06-15 ASSESSMENT — PAIN - FUNCTIONAL ASSESSMENT
PAIN_FUNCTIONAL_ASSESSMENT: 0-10
PAIN_FUNCTIONAL_ASSESSMENT: PREVENTS OR INTERFERES SOME ACTIVE ACTIVITIES AND ADLS

## 2025-06-15 ASSESSMENT — PAIN DESCRIPTION - LOCATION: LOCATION: CHEST

## 2025-06-15 ASSESSMENT — PAIN DESCRIPTION - ONSET: ONSET: PROGRESSIVE

## 2025-06-15 ASSESSMENT — PAIN DESCRIPTION - PAIN TYPE: TYPE: ACUTE PAIN

## 2025-06-15 NOTE — DISCHARGE INSTRUCTIONS
COVID is negative.  Chest x-ray shows no signs of pneumonia.    Continue to alternate acetaminophen and ibuprofen as needed for fever, body aches chills.     Rest.     Encourage oral hydration with water, gatorade, pedialyte, popsicles.     Medications if prescribed.     Follow up with primary care provider as needed. Report to the ED with new or severe symptoms.

## 2025-06-15 NOTE — ED NOTES
Discharge instructions and prescriptions reviewed with pt. Pt verbalized understanding. Pt ambulated out in stable condition.  Assessment unchanged upon discharge.     Gema Russ RN  06/15/25 [id number]

## 2025-06-17 VITALS
TEMPERATURE: 98.6 F | OXYGEN SATURATION: 98 % | HEART RATE: 69 BPM | DIASTOLIC BLOOD PRESSURE: 100 MMHG | BODY MASS INDEX: 30.68 KG/M2 | RESPIRATION RATE: 16 BRPM | SYSTOLIC BLOOD PRESSURE: 190 MMHG | WEIGHT: 220 LBS

## 2025-06-17 ASSESSMENT — ENCOUNTER SYMPTOMS
EYES NEGATIVE: 1
SORE THROAT: 0
COUGH: 1

## 2025-06-17 NOTE — ED PROVIDER NOTES
[facility] URGENT CARE  UrgentCare Encounter      CHIEFCOMPLAINT       Chief Complaint   Patient presents with    Headache    Chest Congestion    Fever     100.5        Nurses Notes reviewed and I agree except as noted in the HPI.  HISTORY OF PRESENT ILLNESS   Babar Haddad is a 70 y.o. male who presents to urgent care with complaints of bodyaches, headache, chest congestion, mild fever.  Symptom onset was approximately 2 days ago.  States that he has been sleeping a lot in the last 2 days.  He is otherwise healthy aside from hypertension.  He is not taking his blood pressure medications today.    REVIEW OF SYSTEMS     Review of Systems   Constitutional:  Positive for chills, fatigue and fever.   HENT:  Positive for congestion. Negative for sore throat.    Eyes: Negative.    Respiratory:  Positive for cough.    Cardiovascular:  Negative for chest pain.   Musculoskeletal:  Positive for myalgias.       PAST MEDICAL HISTORY         Diagnosis Date    Gallbladder disease     GERD (gastroesophageal reflux disease)     HTN (hypertension)     Hyperaldosteronism     Hypertension     Hypokalemia     Metabolic alkalosis     Prostate troubles        SURGICAL HISTORY     Patient  has a past surgical history that includes skin biopsy (11/06/2012); Colonoscopy (05/2013); pr laparoscopy surg cholecystectomy (N/A, 6/9/2018); Cholecystectomy; Colonoscopy (04/2018); and pr office/outpt visit,procedure only (N/A, 7/18/2018).    CURRENT MEDICATIONS       Discharge Medication List as of 6/15/2025 12:01 PM        CONTINUE these medications which have NOT CHANGED    Details   potassium & sodium phosphates (PHOS-NAK) 280-160-250 MG PACK Take 1 packet by mouth 2 times daily (with meals), Disp-60 packet, R-1Normal      labetalol (NORMODYNE) 300 MG tablet Take 1 tablet by mouth 2 times daily, Disp-60 tablet, R-3Normal      eplerenone (INSPRA) 50 MG tablet Take 1 tablet by mouth 2 times daily, Disp-90 tablet, R-3Normal      amLODIPine

## (undated) DEVICE — APPLIER CLP M L L11.4IN DIA10MM ENDOSCP ROT MULT FOR LIG

## (undated) DEVICE — BLADE LARYNSCP SZ 4 ENH DIR INTUB GLIDESCOPE MCGRATH MAC

## (undated) DEVICE — COVER ARMBRD W13XL28.5IN IMPERV BLU FOR OP RM

## (undated) DEVICE — SOLUTION IV IRRIG POUR BRL 0.9% SODIUM CHL 2F7124

## (undated) DEVICE — BAG 3X6 DETACH NYL SPEC

## (undated) DEVICE — CANISTER, RIGID, 2000CC: Brand: MEDLINE INDUSTRIES, INC.

## (undated) DEVICE — AGENT HEMSTAT 3GM PURIFIED PLNT STARCH PWD ABSRB ARISTA AH

## (undated) DEVICE — UNIVERSAL PLUS MULTIFUNCTION INSTRUMENT SYSTEM (STRAIGHT GRIP HANDLE), STRAIGHT HANDLE ELECTROSURGICAL SUCTION/IRRIGATION INSTRUMENT WITH HAND CONTROLLED ELECTROSURGERY (BUTTONS): Brand: UNIVERSAL PLUS

## (undated) DEVICE — STRIP,CLOSURE,WOUND,MEDI-STRIP,1/2X4: Brand: MEDLINE

## (undated) DEVICE — ENDO KIT: Brand: MEDLINE INDUSTRIES, INC.

## (undated) DEVICE — TROCAR: Brand: KII FIOS FIRST ENTRY

## (undated) DEVICE — DEVICE LCK BILI RAP EXCHG W/O BX CAP

## (undated) DEVICE — ROYAL SILK SURGICAL GOWN, XXL: Brand: CONVERTORS

## (undated) DEVICE — BLADE CLIPPER GEN PURP NS

## (undated) DEVICE — GENERAL LAPAROSCOPY PACK-LF: Brand: MEDLINE INDUSTRIES, INC.

## (undated) DEVICE — UNIVERSAL PLUS LAPAROSCOPIC ELECTRODE WITH SUCTION/IRRIGATION, SPATULA 5 MM X 32 CM: Brand: UNIVERSAL PLUS

## (undated) DEVICE — EXCEL 10FT (3.05 M) INSUFFLATION TUBING SET WITH 0.1 MICRON FILTER: Brand: EXCEL

## (undated) DEVICE — YANKAUER,BULB TIP,W/O VENT,RIGID,STERILE: Brand: MEDLINE

## (undated) DEVICE — SET LNR RED GRN W/ BASE CLEANASCOPE

## (undated) DEVICE — TROCAR: Brand: KII® SLEEVE

## (undated) DEVICE — SOLUTION IV IRRIG WATER 1000ML POUR BRL 2F7114

## (undated) DEVICE — SPHINCTEROTOME: Brand: HYDRATOME RX 44

## (undated) DEVICE — TUBING, SUCTION, 1/4" X 20', STRAIGHT: Brand: MEDLINE INDUSTRIES, INC.

## (undated) DEVICE — RETRIEVAL BALLOON CATHETER: Brand: EXTRACTOR™ PRO RX

## (undated) DEVICE — CONTRAST IOTHALAMATE MEGLUMINE 60% 50 ML INJ CONRAY 60

## (undated) DEVICE — SUREFIT, DUAL DISPERSIVE ELECTRODE, CONTACT QUALITY MONITOR: Brand: SUREFIT

## (undated) DEVICE — APPLICATOR SURG XL L38CM FOR ARISTA ABSRB HEMSTAT FLEXITIP